# Patient Record
Sex: MALE | Race: WHITE | Employment: OTHER | URBAN - METROPOLITAN AREA
[De-identification: names, ages, dates, MRNs, and addresses within clinical notes are randomized per-mention and may not be internally consistent; named-entity substitution may affect disease eponyms.]

---

## 2017-01-04 ENCOUNTER — HOSPITAL ENCOUNTER (OUTPATIENT)
Dept: MRI IMAGING | Facility: CLINIC | Age: 57
Discharge: HOME | End: 2017-01-04
Attending: PHYSICIAN ASSISTANT | Admitting: PHYSICIAN ASSISTANT
Payer: MEDICARE

## 2017-01-04 DIAGNOSIS — M54.50 LUMBAR BACK PAIN: ICD-10-CM

## 2017-01-04 PROCEDURE — 72148 MRI LUMBAR SPINE W/O DYE: CPT

## 2017-01-05 ENCOUNTER — TELEPHONE (OUTPATIENT)
Dept: ORTHOPEDICS | Facility: CLINIC | Age: 57
End: 2017-01-05

## 2017-01-05 NOTE — TELEPHONE ENCOUNTER
Results for orders placed or performed during the hospital encounter of 01/04/17   MR Lumbar Spine w/o Contrast    Narrative    MR LUMBAR SPINE WITHOUT CONTRAST   1/4/2017 1:57 PM    HISTORY: Bilateral back and right leg pain for 15 years. No specific  injury.    TECHNIQUE: Sagittal T1 and T2 and STIR, axial proton density and T2  images.    COMPARISON: None.    FINDINGS: Five functional lumbar vertebral segments are assumed. The  caudal thecal sac contents appear intrinsically normal. There is a  grade 1 anterolisthesis of L5 on S1 secondary to bilateral L5 pars  interarticularis defects. Alignment in the sagittal view is otherwise  normal. Benign peridiscal degenerative signal changes seen about the  L5-S1 disc space and vertebral bone marrow signal is otherwise  unremarkable.    T12-L1: Normal.    L1-L2: There is central signal loss within the disc space. This may be  related to calcification. The remainder of the disc space is  unremarkable and there is no central or foraminal stenosis.    L2-L3: Signal loss without disc space narrowing. Mild posterior disc  bulging. There is a superimposed right central focal disc bulge or  minimal broad-based disc protrusion with slight indentation on the  anterior thecal sac (image 6 of series 8) and no impingement on neural  structures. No central or foraminal stenosis. Posterior facets are  normal.    L3-L4: Signal loss without disc space narrowing. Mild diffuse disc  bulging with a superimposed small central extruded disc migrating  superiorly. This indents the anterior thecal sac and contributes to  borderline mild central stenosis. Mild left and minimal right  foraminal stenosis. Posterior facets are normal.    L4-L5: Signal loss without disc space narrowing. Posterior disc  bulging and small central peripheral annular fissure. No acute disc  protrusion or central or foraminal stenosis. No significant posterior  facet degenerative changes.    L5-S1: Signal loss,  degenerative disc space narrowing and grade 1  spondylolytic spondylolisthesis with uncovering of the posterior disc  margin. No disc protrusion or central stenosis. Moderate right and  severe left foraminal stenosis related to the degenerative disc  disease and spondylolisthesis.      Impression    IMPRESSION:   1. Early degenerative disc disease L1-L2 and L2-L3 without impingement  on neural structures.  2. Degenerative disc disease L3-L4 with small central extruded disc  and borderline mild central stenosis.  3. Degenerative disc disease L4-L5 without direct impingement on  neural structures.  4. Grade 1 spondylolytic spondylolisthesis and degenerative disc  disease at L5-S1 resulting in moderate right and severe left foraminal  stenosis. Recommend clinical correlation for left-sided L5 nerve root  symptoms.    FELICIA PHELPS MD   He has findings that correlate with his symptoms.  I am recommending physical therapy and an epidural steroid injection to start.  If this fails to improve symptoms he may require further evaluation.      Michelle Waggoner PA-C

## 2017-01-09 NOTE — TELEPHONE ENCOUNTER
"Spoke to patient discussed MRI findings and recommendations.  States he tried ANANTH a year ago and it didn't work.  He has been doing PT over the last year and that increased his pain.  He has an appointment scheduled for a surgical consult later this month.  Also states he has been told his pelvis is \"out of whack\" and wants to know if that is the cause of his right hip pain.  Michelle, would you like him to come in for an eval or do you have further recommendations at this time?    Are there further questions or concerns: Yes  If Yes, they are: as noted  Is a return call needed: Yes  If Yes, preferred contact number: Home    Nakia Richardson, NISHANT   "

## 2017-01-10 NOTE — TELEPHONE ENCOUNTER
Patient already has an appointment with a surgeon, this will be the course of action he will take.    Navdeep GONZALEZ

## 2017-01-10 NOTE — TELEPHONE ENCOUNTER
"The only other option I can provide for him is to have a consultation with a surgeon.  If his pelvis is \"out of alignment\" this can be corrected with physical therapy as they are the appropriate place to assess, evaluate and treat this.  And yes this can help provide relief to his back pain.  I can place the physical therapy order for him or the consultation with a spine surgeon if he prefers.      Michelle Waggoner PA-C   "

## 2017-01-26 ENCOUNTER — TRANSFERRED RECORDS (OUTPATIENT)
Dept: HEALTH INFORMATION MANAGEMENT | Facility: CLINIC | Age: 57
End: 2017-01-26

## 2017-05-02 ENCOUNTER — HISTORIC RESULTS (OUTPATIENT)
Dept: ADMINISTRATIVE | Age: 57
End: 2017-05-02

## 2017-06-08 ENCOUNTER — TRANSFERRED RECORDS (OUTPATIENT)
Dept: HEALTH INFORMATION MANAGEMENT | Facility: HOSPITAL | Age: 57
End: 2017-06-08

## 2017-06-22 ENCOUNTER — TRANSFERRED RECORDS (OUTPATIENT)
Dept: HEALTH INFORMATION MANAGEMENT | Facility: HOSPITAL | Age: 57
End: 2017-06-22

## 2017-06-29 ENCOUNTER — TELEPHONE (OUTPATIENT)
Dept: EDUCATION SERVICES | Facility: HOSPITAL | Age: 57
End: 2017-06-29

## 2017-06-29 DIAGNOSIS — E11.65 TYPE 2 DIABETES MELLITUS WITH HYPERGLYCEMIA, WITHOUT LONG-TERM CURRENT USE OF INSULIN (H): Primary | ICD-10-CM

## 2017-09-21 ENCOUNTER — TRANSFERRED RECORDS (OUTPATIENT)
Dept: HEALTH INFORMATION MANAGEMENT | Facility: HOSPITAL | Age: 57
End: 2017-09-21

## 2017-09-21 LAB — HBA1C MFR BLD: 11 % (ref 0–5.7)

## 2017-09-28 ENCOUNTER — HOSPITAL ENCOUNTER (OUTPATIENT)
Dept: EDUCATION SERVICES | Facility: HOSPITAL | Age: 57
Discharge: HOME | End: 2017-09-28
Attending: NURSE PRACTITIONER | Admitting: NURSE PRACTITIONER
Payer: MEDICARE

## 2017-09-28 VITALS
DIASTOLIC BLOOD PRESSURE: 88 MMHG | BODY MASS INDEX: 41.98 KG/M2 | HEART RATE: 98 BPM | WEIGHT: 299.9 LBS | OXYGEN SATURATION: 96 % | HEIGHT: 71 IN | SYSTOLIC BLOOD PRESSURE: 143 MMHG

## 2017-09-28 DIAGNOSIS — E11.65 TYPE 2 DIABETES MELLITUS WITH HYPERGLYCEMIA, WITHOUT LONG-TERM CURRENT USE OF INSULIN (H): Primary | ICD-10-CM

## 2017-09-28 PROCEDURE — G0108 DIAB MANAGE TRN  PER INDIV: HCPCS

## 2017-09-28 RX ORDER — LANCETS
EACH MISCELLANEOUS
Qty: 102 EACH | Refills: 10 | Status: SHIPPED | OUTPATIENT
Start: 2017-09-28 | End: 2018-06-25

## 2017-09-28 ASSESSMENT — ANXIETY QUESTIONNAIRES
GAD7 TOTAL SCORE: 6
1. FEELING NERVOUS, ANXIOUS, OR ON EDGE: NOT AT ALL
5. BEING SO RESTLESS THAT IT IS HARD TO SIT STILL: MORE THAN HALF THE DAYS
3. WORRYING TOO MUCH ABOUT DIFFERENT THINGS: SEVERAL DAYS
6. BECOMING EASILY ANNOYED OR IRRITABLE: SEVERAL DAYS
IF YOU CHECKED OFF ANY PROBLEMS ON THIS QUESTIONNAIRE, HOW DIFFICULT HAVE THESE PROBLEMS MADE IT FOR YOU TO DO YOUR WORK, TAKE CARE OF THINGS AT HOME, OR GET ALONG WITH OTHER PEOPLE: SOMEWHAT DIFFICULT
2. NOT BEING ABLE TO STOP OR CONTROL WORRYING: NOT AT ALL
7. FEELING AFRAID AS IF SOMETHING AWFUL MIGHT HAPPEN: SEVERAL DAYS

## 2017-09-28 ASSESSMENT — PATIENT HEALTH QUESTIONNAIRE - PHQ9
SUM OF ALL RESPONSES TO PHQ QUESTIONS 1-9: 5
5. POOR APPETITE OR OVEREATING: SEVERAL DAYS

## 2017-09-28 ASSESSMENT — PAIN SCALES - GENERAL: PAINLEVEL: MODERATE PAIN (4)

## 2017-09-28 NOTE — NURSING NOTE
"Chief Complaint   Patient presents with     Diabetes     review       Initial /88 (BP Location: Left arm, Patient Position: Chair, Cuff Size: Adult Large)  Pulse 98  Ht 1.803 m (5' 11\")  Wt 136 kg (299 lb 14.4 oz)  SpO2 96%  BMI 41.83 kg/m2 Estimated body mass index is 41.83 kg/(m^2) as calculated from the following:    Height as of this encounter: 1.803 m (5' 11\").    Weight as of this encounter: 136 kg (299 lb 14.4 oz).  Medication Reconciliation: complete   Silvana Vicente      "

## 2017-09-28 NOTE — PROGRESS NOTES
"Aldo is here for diabetes review. /88 (BP Location: Left arm, Patient Position: Chair, Cuff Size: Adult Large)  Pulse 98  Ht 1.803 m (5' 11\")  Wt 136 kg (299 lb 14.4 oz)  SpO2 96%  BMI 41.83 kg/m2 I had understood that he was here to start insulin, but he tells me he is not starting insulin today.  Aldo states that he was recently started on Glimepiride. He is taking Metformin 1000 mg bid and Glimepiride 2 mg daily. Aldo has not been testing and he needs a meter. His most recent A1C on 9/21/17 was 11 and his previous A1C on 6/8/17 was also 11. Aldo tells me that he needs 4 surgeries but understands that he won't be able to have surgery until A1C comes down.    I asked why he did not want to start insulin. States he wants to give the Glimepiride a chance. Reviewed actions of his diabetes medications. I did discuss with him that insulin would decrease his BG more readily and the injection hurts less than a BG test. With BG testing we will be able to determine if Glimepiride is working in about 2 weeks. I told him that he doesn't want to wait for his next A1C to see that it is still elevated.    Gave Aldo an Accu-Chek Guide meter and showed him how to use it and the puncture device. Will send in prescriptions for meter supplies. I requested that Aldo test once a day varying between fasting and 2 hours after supper. I gave him a log book with testing times and targets noted.    Reviewed usual foods eaten. Aldo gets up at 7 am and drinks coffee but doesn't eat until noon/1 pm: 2 pieces toast with a glass or 2 of milk. Midday he has a smoothie made with spinach and 2 fruits. Evening meal: skinless chicken, fish or lean meat with veggies or crockpot: pork with potatoes/carrots or pasta - is watching portion size. Beverages: coffee 5-6 cups, milk, Powerade Zero.  Snacks: vegetables and 1-2 donuts per week. Aldo declines to keep food records.    Reviewed carb containing foods, portions " sizes and individualized meal plan.    Will follow in one month for BG review and medication adjust    Time spent with patient: 60 minutes

## 2017-09-29 ENCOUNTER — TELEPHONE (OUTPATIENT)
Dept: EDUCATION SERVICES | Facility: HOSPITAL | Age: 57
End: 2017-09-29

## 2017-09-29 DIAGNOSIS — E11.65 TYPE 2 DIABETES MELLITUS WITH HYPERGLYCEMIA, WITHOUT LONG-TERM CURRENT USE OF INSULIN (H): Primary | ICD-10-CM

## 2017-09-29 ASSESSMENT — ANXIETY QUESTIONNAIRES: GAD7 TOTAL SCORE: 6

## 2017-10-26 ENCOUNTER — HOSPITAL ENCOUNTER (OUTPATIENT)
Dept: EDUCATION SERVICES | Facility: HOSPITAL | Age: 57
Discharge: HOME | End: 2017-10-26
Attending: NURSE PRACTITIONER | Admitting: FAMILY MEDICINE
Payer: MEDICARE

## 2017-10-26 VITALS
WEIGHT: 297.5 LBS | HEIGHT: 71 IN | OXYGEN SATURATION: 98 % | SYSTOLIC BLOOD PRESSURE: 114 MMHG | HEART RATE: 109 BPM | BODY MASS INDEX: 41.65 KG/M2 | DIASTOLIC BLOOD PRESSURE: 73 MMHG

## 2017-10-26 DIAGNOSIS — E11.65 TYPE 2 DIABETES MELLITUS WITH HYPERGLYCEMIA, WITHOUT LONG-TERM CURRENT USE OF INSULIN (H): Primary | ICD-10-CM

## 2017-10-26 PROCEDURE — G0108 DIAB MANAGE TRN  PER INDIV: HCPCS

## 2017-10-26 ASSESSMENT — PAIN SCALES - GENERAL: PAINLEVEL: MODERATE PAIN (4)

## 2017-10-26 NOTE — PROGRESS NOTES
"Aldo is here for BG review. /73  Pulse 109  Ht 1.803 m (5' 11\")  Wt 134.9 kg (297 lb 8 oz)  SpO2 98%  BMI 41.49 kg/m2  He is taking Metformin 1000 mg bid and Glimepiride 2 mg daily. Readings range as follows: fastin, 136-165, 182, 203, 224 and post-supper: 130-182, 190, 196, 207, 210, 236 (8 of 14 readings above target). Denies lows.    Aldo is happy as his weight continues to decrease. Weight is down 2 lbs since our last visit.    Reviewed BG readings and Aldo understands that both am and post-supper readings are above target. Reviewed BG/A1C targets and actions of medications. He is open to adding medications as needed so he can decrease his A1C so he can have surgery.    Reviewed potential medication changes. We could increase Glimepiride but it may create midday lows as Aldo doesn't always eat midday and it does promote weight gain. Insulin may promote weight gain. I did discuss adding Victoza and did discuss side effects and potential benefits. It may decrease post-meal readings and have some effect on morning BG readings. Aldo is open to me contacting Dr. Bauer about Victoza.    Plan:  Contact Dr. Bauer about diabetes medication addition.    Time spent with patient 30 minutes.  "

## 2017-10-26 NOTE — NURSING NOTE
"Chief Complaint   Patient presents with     Diabetes     review       Initial /73  Pulse 109  Ht 1.803 m (5' 11\")  Wt 134.9 kg (297 lb 8 oz)  SpO2 98%  BMI 41.49 kg/m2 Estimated body mass index is 41.49 kg/(m^2) as calculated from the following:    Height as of this encounter: 1.803 m (5' 11\").    Weight as of this encounter: 134.9 kg (297 lb 8 oz).  Medication Reconciliation: complete   Silvana Vicente      "

## 2017-11-08 ENCOUNTER — HOSPITAL ENCOUNTER (OUTPATIENT)
Dept: EDUCATION SERVICES | Facility: HOSPITAL | Age: 57
Discharge: HOME | End: 2017-11-08
Attending: NURSE PRACTITIONER | Admitting: FAMILY MEDICINE
Payer: MEDICARE

## 2017-11-08 VITALS
WEIGHT: 300.3 LBS | SYSTOLIC BLOOD PRESSURE: 130 MMHG | HEIGHT: 71 IN | OXYGEN SATURATION: 95 % | DIASTOLIC BLOOD PRESSURE: 83 MMHG | HEART RATE: 110 BPM | BODY MASS INDEX: 42.04 KG/M2

## 2017-11-08 DIAGNOSIS — E11.65 TYPE 2 DIABETES MELLITUS WITH HYPERGLYCEMIA, WITHOUT LONG-TERM CURRENT USE OF INSULIN (H): Primary | ICD-10-CM

## 2017-11-08 PROCEDURE — G0108 DIAB MANAGE TRN  PER INDIV: HCPCS

## 2017-11-08 RX ORDER — DIPHENHYDRAMINE HYDROCHLORIDE 25 MG/1
CAPSULE, LIQUID FILLED ORAL 2 TIMES DAILY
COMMUNITY
Start: 2017-09-22

## 2017-11-08 ASSESSMENT — PAIN SCALES - GENERAL: PAINLEVEL: WORST PAIN (10)

## 2017-11-08 NOTE — NURSING NOTE
"Chief Complaint   Patient presents with     Diabetes Education     Starting Victoza        Initial /83 (BP Location: Left arm, Patient Position: Chair, Cuff Size: Adult Large)  Pulse 110  Ht 1.803 m (5' 11\")  Wt (!) 136.2 kg (300 lb 4.8 oz)  SpO2 95%  BMI 41.88 kg/m2 Estimated body mass index is 41.88 kg/(m^2) as calculated from the following:    Height as of this encounter: 1.803 m (5' 11\").    Weight as of this encounter: 136.2 kg (300 lb 4.8 oz).  Medication Reconciliation: complete   Michelle Daly LPN       "

## 2017-11-10 RX ORDER — LIRAGLUTIDE 6 MG/ML
1.2 INJECTION SUBCUTANEOUS DAILY
Start: 2017-11-10 | End: 2018-06-19

## 2017-11-11 NOTE — PROGRESS NOTES
"Aldo is here to start Victoza. /83 (BP Location: Left arm, Patient Position: Chair, Cuff Size: Adult Large)  Pulse 110  Ht 1.803 m (5' 11\")  Wt (!) 136.2 kg (300 lb 4.8 oz)  SpO2 95%  BMI 41.88 kg/h8Bhsgqanr signed, faxed order from Dr. Bauer to start Victoza and stop Glimepiride. Aldo is taking Metformin 1000 mg bid and Glimepiride 2 mg daily. He did not bring his meter today.     Instructed Aldo about the action of Victoza, side effects, storage, how to use the Victoza pen, Victoza dosage,injection site selection/rotation, disposal of insulin pen needles.    Aldo was able to perform a Victoza injection using a demo pen and injection pillow with minimal cueing.    Plan:  Stop Glimepiride  Start Victoza 0.6 mg daily for one week then increase to 1.2 mg daily.  Call Emma with any questions or concerns.  I will call you next Tuesday to see how you are doing.    Time spent with patient 30 minutes.      "

## 2017-11-13 ENCOUNTER — TELEPHONE (OUTPATIENT)
Dept: EDUCATION SERVICES | Facility: HOSPITAL | Age: 57
End: 2017-11-13

## 2017-11-13 NOTE — TELEPHONE ENCOUNTER
Aldo had a severe reaction to Victoza. He had a headache, lightheadedness, pain in stomach. Went to ED. Stopped Victoza. He is going back to taking Glimepiride 2 mg daily. He wants to follow with Dr. Bauer in January for his A1C before looking at adding any other medication.

## 2018-06-17 ENCOUNTER — ANESTHESIA EVENT (OUTPATIENT)
Dept: SURGERY | Facility: CLINIC | Age: 58
DRG: 470 | End: 2018-06-17
Payer: MEDICARE

## 2018-06-19 RX ORDER — GLIMEPIRIDE 2 MG/1
1 TABLET ORAL
Refills: 0 | COMMUNITY
Start: 2017-10-23 | End: 2020-03-04

## 2018-06-20 ASSESSMENT — LIFESTYLE VARIABLES: TOBACCO_USE: 1

## 2018-06-20 NOTE — ANESTHESIA PREPROCEDURE EVALUATION
Anesthesia Evaluation     .             ROS/MED HX    ENT/Pulmonary:     (+)DELMAR risk factors obese, tobacco use, Current use 1-2 cigars per week  packs/day  , . .    Neurologic:       Cardiovascular:  - neg cardiovascular ROS   (+) ----. : . . . :. . Previous cardiac testing date:results:date: results:ECG reviewed date:5/2/17 results:ST, PVC's and fusion complexes, LAFB, possible lateral infarct-age undetermined date: results:          METS/Exercise Tolerance:  >4 METS   Hematologic:  - neg hematologic  ROS       Musculoskeletal:   (+) , , other musculoskeletal- DJD ; left shoulder pain; displaced disc; bilat. knee pain       GI/Hepatic:  - neg GI/hepatic ROS       Renal/Genitourinary:  - ROS Renal section negative       Endo:     (+) type II DM Last HgA1c: 11.0 Normal glucose range: 167 today Obesity, .      Psychiatric:  - neg psychiatric ROS       Infectious Disease:  - neg infectious disease ROS       Malignancy:      - no malignancy   Other:    (+) H/O Chronic Pain,                   Physical Exam  Normal systems: cardiovascular and pulmonary    Airway   Mallampati: III  TM distance: >3 FB  Neck ROM: full    Dental   (+) missing    Cardiovascular       Pulmonary                     Anesthesia Plan      History & Physical Review  History and physical reviewed and following examination; no interval change.    ASA Status:  3 .    NPO Status:  > 8 hours    Plan for General and Spinal with Propofol induction. Maintenance will be Balanced.    PONV prophylaxis:  Ondansetron (or other 5HT-3) and Dexamethasone or Solumedrol       Postoperative Care  Postoperative pain management:  IV analgesics and Oral pain medications.      Consents  Anesthetic plan, risks, benefits and alternatives discussed with:  Patient..                          .

## 2018-06-21 ENCOUNTER — ANESTHESIA (OUTPATIENT)
Dept: SURGERY | Facility: CLINIC | Age: 58
DRG: 470 | End: 2018-06-21
Payer: MEDICARE

## 2018-06-21 ENCOUNTER — SURGERY (OUTPATIENT)
Age: 58
End: 2018-06-21

## 2018-06-21 ENCOUNTER — HOSPITAL ENCOUNTER (INPATIENT)
Facility: CLINIC | Age: 58
LOS: 3 days | Discharge: NURSING, REHAB OR LONG-TERM CARE | DRG: 470 | End: 2018-06-24
Attending: ORTHOPAEDIC SURGERY | Admitting: ORTHOPAEDIC SURGERY
Payer: MEDICARE

## 2018-06-21 ENCOUNTER — APPOINTMENT (OUTPATIENT)
Dept: GENERAL RADIOLOGY | Facility: CLINIC | Age: 58
DRG: 470 | End: 2018-06-21
Attending: ORTHOPAEDIC SURGERY
Payer: MEDICARE

## 2018-06-21 DIAGNOSIS — Z96.641 STATUS POST TOTAL REPLACEMENT OF RIGHT HIP: Primary | ICD-10-CM

## 2018-06-21 PROBLEM — E11.65 TYPE 2 DIABETES MELLITUS WITH HYPERGLYCEMIA, WITHOUT LONG-TERM CURRENT USE OF INSULIN (H): Status: ACTIVE | Noted: 2017-10-27

## 2018-06-21 PROBLEM — E66.01 MORBID OBESITY, UNSPECIFIED OBESITY TYPE (H): Status: ACTIVE | Noted: 2017-06-22

## 2018-06-21 PROBLEM — M16.9 DEGENERATIVE JOINT DISEASE (DJD) OF HIP: Status: ACTIVE | Noted: 2018-06-21

## 2018-06-21 PROBLEM — Z96.649 S/P TOTAL HIP ARTHROPLASTY: Status: ACTIVE | Noted: 2018-06-21

## 2018-06-21 LAB
GLUCOSE BLDC GLUCOMTR-MCNC: 130 MG/DL (ref 70–99)
GLUCOSE BLDC GLUCOMTR-MCNC: 167 MG/DL (ref 70–99)
GLUCOSE BLDC GLUCOMTR-MCNC: 178 MG/DL (ref 70–99)

## 2018-06-21 PROCEDURE — 25000125 ZZHC RX 250: Performed by: ORTHOPAEDIC SURGERY

## 2018-06-21 PROCEDURE — A9270 NON-COVERED ITEM OR SERVICE: HCPCS | Mod: GY | Performed by: PHYSICIAN ASSISTANT

## 2018-06-21 PROCEDURE — 25000128 H RX IP 250 OP 636: Performed by: PHYSICIAN ASSISTANT

## 2018-06-21 PROCEDURE — 40000986 XR PELVIS AD HIP PORTABLE RIGHT 1 VIEW

## 2018-06-21 PROCEDURE — C1776 JOINT DEVICE (IMPLANTABLE): HCPCS | Performed by: ORTHOPAEDIC SURGERY

## 2018-06-21 PROCEDURE — 37000009 ZZH ANESTHESIA TECHNICAL FEE, EACH ADDTL 15 MIN: Performed by: ORTHOPAEDIC SURGERY

## 2018-06-21 PROCEDURE — 27210794 ZZH OR GENERAL SUPPLY STERILE: Performed by: ORTHOPAEDIC SURGERY

## 2018-06-21 PROCEDURE — 36000093 ZZH SURGERY LEVEL 4 1ST 30 MIN: Performed by: ORTHOPAEDIC SURGERY

## 2018-06-21 PROCEDURE — 25000128 H RX IP 250 OP 636: Performed by: NURSE ANESTHETIST, CERTIFIED REGISTERED

## 2018-06-21 PROCEDURE — 0SR902A REPLACEMENT OF RIGHT HIP JOINT WITH METAL ON POLYETHYLENE SYNTHETIC SUBSTITUTE, UNCEMENTED, OPEN APPROACH: ICD-10-PCS | Performed by: ORTHOPAEDIC SURGERY

## 2018-06-21 PROCEDURE — 40000305 ZZH STATISTIC PRE PROC ASSESS I: Performed by: ORTHOPAEDIC SURGERY

## 2018-06-21 PROCEDURE — 37000008 ZZH ANESTHESIA TECHNICAL FEE, 1ST 30 MIN: Performed by: ORTHOPAEDIC SURGERY

## 2018-06-21 PROCEDURE — C1713 ANCHOR/SCREW BN/BN,TIS/BN: HCPCS | Performed by: ORTHOPAEDIC SURGERY

## 2018-06-21 PROCEDURE — 00000146 ZZHCL STATISTIC GLUCOSE BY METER IP

## 2018-06-21 PROCEDURE — 25000132 ZZH RX MED GY IP 250 OP 250 PS 637: Mod: GY | Performed by: ORTHOPAEDIC SURGERY

## 2018-06-21 PROCEDURE — 25000125 ZZHC RX 250: Performed by: PHYSICIAN ASSISTANT

## 2018-06-21 PROCEDURE — A9270 NON-COVERED ITEM OR SERVICE: HCPCS | Mod: GY | Performed by: ORTHOPAEDIC SURGERY

## 2018-06-21 PROCEDURE — 36000063 ZZH SURGERY LEVEL 4 EA 15 ADDTL MIN: Performed by: ORTHOPAEDIC SURGERY

## 2018-06-21 PROCEDURE — 25000125 ZZHC RX 250: Performed by: NURSE ANESTHETIST, CERTIFIED REGISTERED

## 2018-06-21 PROCEDURE — 71000012 ZZH RECOVERY PHASE 1 LEVEL 1 FIRST HR: Performed by: ORTHOPAEDIC SURGERY

## 2018-06-21 PROCEDURE — 25000128 H RX IP 250 OP 636: Performed by: ORTHOPAEDIC SURGERY

## 2018-06-21 PROCEDURE — 71000013 ZZH RECOVERY PHASE 1 LEVEL 1 EA ADDTL HR: Performed by: ORTHOPAEDIC SURGERY

## 2018-06-21 PROCEDURE — 27210995 ZZH RX 272: Performed by: ORTHOPAEDIC SURGERY

## 2018-06-21 PROCEDURE — 12000007 ZZH R&B INTERMEDIATE

## 2018-06-21 PROCEDURE — 25000132 ZZH RX MED GY IP 250 OP 250 PS 637: Mod: GY | Performed by: PHYSICIAN ASSISTANT

## 2018-06-21 DEVICE — IMP HEAD FEMORAL DEPUY CERAMIC 36MM +1.5MM 1365-36-310: Type: IMPLANTABLE DEVICE | Site: HIP | Status: FUNCTIONAL

## 2018-06-21 DEVICE — IMPLANTABLE DEVICE: Type: IMPLANTABLE DEVICE | Site: HIP | Status: FUNCTIONAL

## 2018-06-21 DEVICE — IMP SCR BONE CAN ACE 6.5X25MM 1217-25-500: Type: IMPLANTABLE DEVICE | Site: HIP | Status: FUNCTIONAL

## 2018-06-21 DEVICE — IMP CUP ACE PINNACLE 58MM 1217-22-058: Type: IMPLANTABLE DEVICE | Site: HIP | Status: FUNCTIONAL

## 2018-06-21 DEVICE — IMP SCR BONE CAN ACE 6.5X20MM 1217-20-500: Type: IMPLANTABLE DEVICE | Site: HIP | Status: FUNCTIONAL

## 2018-06-21 RX ORDER — ONDANSETRON 4 MG/1
4 TABLET, ORALLY DISINTEGRATING ORAL EVERY 6 HOURS PRN
Status: DISCONTINUED | OUTPATIENT
Start: 2018-06-21 | End: 2018-06-24 | Stop reason: HOSPADM

## 2018-06-21 RX ORDER — OXYCODONE HYDROCHLORIDE 5 MG/1
5-10 TABLET ORAL
Status: DISCONTINUED | OUTPATIENT
Start: 2018-06-21 | End: 2018-06-24 | Stop reason: HOSPADM

## 2018-06-21 RX ORDER — KETOROLAC TROMETHAMINE 15 MG/ML
15 INJECTION, SOLUTION INTRAMUSCULAR; INTRAVENOUS EVERY 6 HOURS PRN
Status: COMPLETED | OUTPATIENT
Start: 2018-06-21 | End: 2018-06-23

## 2018-06-21 RX ORDER — CEFAZOLIN SODIUM 1 G/50ML
1 INJECTION, SOLUTION INTRAVENOUS SEE ADMIN INSTRUCTIONS
Status: DISCONTINUED | OUTPATIENT
Start: 2018-06-21 | End: 2018-06-21 | Stop reason: HOSPADM

## 2018-06-21 RX ORDER — NALOXONE HYDROCHLORIDE 0.4 MG/ML
.1-.4 INJECTION, SOLUTION INTRAMUSCULAR; INTRAVENOUS; SUBCUTANEOUS
Status: DISCONTINUED | OUTPATIENT
Start: 2018-06-21 | End: 2018-06-24 | Stop reason: HOSPADM

## 2018-06-21 RX ORDER — GLYCOPYRROLATE 0.2 MG/ML
INJECTION, SOLUTION INTRAMUSCULAR; INTRAVENOUS PRN
Status: DISCONTINUED | OUTPATIENT
Start: 2018-06-21 | End: 2018-06-21

## 2018-06-21 RX ORDER — BUPIVACAINE HYDROCHLORIDE 5 MG/ML
INJECTION, SOLUTION EPIDURAL; INTRACAUDAL PRN
Status: DISCONTINUED | OUTPATIENT
Start: 2018-06-21 | End: 2018-06-21

## 2018-06-21 RX ORDER — AMOXICILLIN 250 MG
2 CAPSULE ORAL 2 TIMES DAILY
Status: DISCONTINUED | OUTPATIENT
Start: 2018-06-21 | End: 2018-06-24 | Stop reason: HOSPADM

## 2018-06-21 RX ORDER — ONDANSETRON 2 MG/ML
INJECTION INTRAMUSCULAR; INTRAVENOUS PRN
Status: DISCONTINUED | OUTPATIENT
Start: 2018-06-21 | End: 2018-06-21

## 2018-06-21 RX ORDER — AMOXICILLIN 250 MG
1 CAPSULE ORAL 2 TIMES DAILY
Status: DISCONTINUED | OUTPATIENT
Start: 2018-06-21 | End: 2018-06-24 | Stop reason: HOSPADM

## 2018-06-21 RX ORDER — HYDROXYZINE HYDROCHLORIDE 25 MG/1
25 TABLET, FILM COATED ORAL EVERY 6 HOURS PRN
Status: DISCONTINUED | OUTPATIENT
Start: 2018-06-21 | End: 2018-06-24 | Stop reason: HOSPADM

## 2018-06-21 RX ORDER — ATORVASTATIN CALCIUM 20 MG/1
40 TABLET, FILM COATED ORAL
Status: DISCONTINUED | OUTPATIENT
Start: 2018-06-21 | End: 2018-06-24 | Stop reason: HOSPADM

## 2018-06-21 RX ORDER — LIDOCAINE 40 MG/G
CREAM TOPICAL
Status: DISCONTINUED | OUTPATIENT
Start: 2018-06-21 | End: 2018-06-24 | Stop reason: HOSPADM

## 2018-06-21 RX ORDER — CYCLOBENZAPRINE HCL 10 MG
10 TABLET ORAL 3 TIMES DAILY PRN
Status: DISCONTINUED | OUTPATIENT
Start: 2018-06-21 | End: 2018-06-24 | Stop reason: HOSPADM

## 2018-06-21 RX ORDER — DEXTROSE MONOHYDRATE 25 G/50ML
25-50 INJECTION, SOLUTION INTRAVENOUS
Status: DISCONTINUED | OUTPATIENT
Start: 2018-06-21 | End: 2018-06-24 | Stop reason: HOSPADM

## 2018-06-21 RX ORDER — CEFAZOLIN SODIUM 1 G/50ML
3 SOLUTION INTRAVENOUS
Status: COMPLETED | OUTPATIENT
Start: 2018-06-21 | End: 2018-06-21

## 2018-06-21 RX ORDER — SODIUM CHLORIDE, SODIUM LACTATE, POTASSIUM CHLORIDE, CALCIUM CHLORIDE 600; 310; 30; 20 MG/100ML; MG/100ML; MG/100ML; MG/100ML
INJECTION, SOLUTION INTRAVENOUS CONTINUOUS
Status: DISCONTINUED | OUTPATIENT
Start: 2018-06-21 | End: 2018-06-22 | Stop reason: CLARIF

## 2018-06-21 RX ORDER — DEXAMETHASONE SODIUM PHOSPHATE 4 MG/ML
INJECTION, SOLUTION INTRA-ARTICULAR; INTRALESIONAL; INTRAMUSCULAR; INTRAVENOUS; SOFT TISSUE PRN
Status: DISCONTINUED | OUTPATIENT
Start: 2018-06-21 | End: 2018-06-21

## 2018-06-21 RX ORDER — ACETAMINOPHEN 325 MG/1
975 TABLET ORAL EVERY 8 HOURS
Status: COMPLETED | OUTPATIENT
Start: 2018-06-21 | End: 2018-06-24

## 2018-06-21 RX ORDER — PROPOFOL 10 MG/ML
INJECTION, EMULSION INTRAVENOUS CONTINUOUS PRN
Status: DISCONTINUED | OUTPATIENT
Start: 2018-06-21 | End: 2018-06-21

## 2018-06-21 RX ORDER — LIDOCAINE 40 MG/G
CREAM TOPICAL
Status: DISCONTINUED | OUTPATIENT
Start: 2018-06-21 | End: 2018-06-21 | Stop reason: HOSPADM

## 2018-06-21 RX ORDER — GLIMEPIRIDE 1 MG/1
1 TABLET ORAL
Status: DISCONTINUED | OUTPATIENT
Start: 2018-06-22 | End: 2018-06-24 | Stop reason: HOSPADM

## 2018-06-21 RX ORDER — HYDROMORPHONE HYDROCHLORIDE 1 MG/ML
.3-.5 INJECTION, SOLUTION INTRAMUSCULAR; INTRAVENOUS; SUBCUTANEOUS
Status: DISCONTINUED | OUTPATIENT
Start: 2018-06-21 | End: 2018-06-24 | Stop reason: HOSPADM

## 2018-06-21 RX ORDER — SODIUM CHLORIDE, SODIUM LACTATE, POTASSIUM CHLORIDE, CALCIUM CHLORIDE 600; 310; 30; 20 MG/100ML; MG/100ML; MG/100ML; MG/100ML
INJECTION, SOLUTION INTRAVENOUS CONTINUOUS
Status: DISCONTINUED | OUTPATIENT
Start: 2018-06-21 | End: 2018-06-21 | Stop reason: HOSPADM

## 2018-06-21 RX ORDER — ONDANSETRON 2 MG/ML
4 INJECTION INTRAMUSCULAR; INTRAVENOUS EVERY 6 HOURS PRN
Status: DISCONTINUED | OUTPATIENT
Start: 2018-06-21 | End: 2018-06-24 | Stop reason: HOSPADM

## 2018-06-21 RX ORDER — CEFAZOLIN SODIUM 2 G/100ML
2 INJECTION, SOLUTION INTRAVENOUS EVERY 8 HOURS
Status: COMPLETED | OUTPATIENT
Start: 2018-06-21 | End: 2018-06-22

## 2018-06-21 RX ORDER — NICOTINE POLACRILEX 4 MG
15-30 LOZENGE BUCCAL
Status: DISCONTINUED | OUTPATIENT
Start: 2018-06-21 | End: 2018-06-24 | Stop reason: HOSPADM

## 2018-06-21 RX ORDER — LIDOCAINE HYDROCHLORIDE 10 MG/ML
INJECTION, SOLUTION INFILTRATION; PERINEURAL PRN
Status: DISCONTINUED | OUTPATIENT
Start: 2018-06-21 | End: 2018-06-21

## 2018-06-21 RX ORDER — FENTANYL CITRATE 50 UG/ML
INJECTION, SOLUTION INTRAMUSCULAR; INTRAVENOUS PRN
Status: DISCONTINUED | OUTPATIENT
Start: 2018-06-21 | End: 2018-06-21

## 2018-06-21 RX ORDER — ACETAMINOPHEN 325 MG/1
650 TABLET ORAL EVERY 4 HOURS PRN
Status: DISCONTINUED | OUTPATIENT
Start: 2018-06-24 | End: 2018-06-24 | Stop reason: HOSPADM

## 2018-06-21 RX ADMIN — ONDANSETRON 4 MG: 2 INJECTION INTRAMUSCULAR; INTRAVENOUS at 16:03

## 2018-06-21 RX ADMIN — KETOROLAC TROMETHAMINE 15 MG: 15 INJECTION, SOLUTION INTRAMUSCULAR; INTRAVENOUS at 23:07

## 2018-06-21 RX ADMIN — PHENYLEPHRINE HYDROCHLORIDE 100 MCG: 10 INJECTION, SOLUTION INTRAMUSCULAR; INTRAVENOUS; SUBCUTANEOUS at 16:13

## 2018-06-21 RX ADMIN — LIDOCAINE HYDROCHLORIDE 1 ML: 10 INJECTION, SOLUTION EPIDURAL; INFILTRATION; INTRACAUDAL; PERINEURAL at 13:26

## 2018-06-21 RX ADMIN — HYDROMORPHONE HYDROCHLORIDE 0.5 MG: 1 INJECTION, SOLUTION INTRAMUSCULAR; INTRAVENOUS; SUBCUTANEOUS at 23:07

## 2018-06-21 RX ADMIN — ATORVASTATIN CALCIUM 40 MG: 20 TABLET, FILM COATED ORAL at 19:54

## 2018-06-21 RX ADMIN — PROPOFOL 50 MCG/KG/MIN: 10 INJECTION, EMULSION INTRAVENOUS at 15:49

## 2018-06-21 RX ADMIN — ACETAMINOPHEN 975 MG: 325 TABLET, FILM COATED ORAL at 19:53

## 2018-06-21 RX ADMIN — FENTANYL CITRATE 100 MCG: 50 INJECTION, SOLUTION INTRAMUSCULAR; INTRAVENOUS at 15:23

## 2018-06-21 RX ADMIN — CEFAZOLIN SODIUM 2 G: 2 INJECTION, SOLUTION INTRAVENOUS at 23:07

## 2018-06-21 RX ADMIN — LIDOCAINE HYDROCHLORIDE 50 MG: 10 INJECTION, SOLUTION INFILTRATION; PERINEURAL at 15:29

## 2018-06-21 RX ADMIN — SODIUM CHLORIDE, POTASSIUM CHLORIDE, SODIUM LACTATE AND CALCIUM CHLORIDE: 600; 310; 30; 20 INJECTION, SOLUTION INTRAVENOUS at 19:55

## 2018-06-21 RX ADMIN — DEXAMETHASONE SODIUM PHOSPHATE 8 MG: 4 INJECTION, SOLUTION INTRA-ARTICULAR; INTRALESIONAL; INTRAMUSCULAR; INTRAVENOUS; SOFT TISSUE at 16:03

## 2018-06-21 RX ADMIN — POVIDONE-IODINE 100 ML GIVEN: 10 SOLUTION TOPICAL at 16:49

## 2018-06-21 RX ADMIN — SODIUM CHLORIDE, POTASSIUM CHLORIDE, SODIUM LACTATE AND CALCIUM CHLORIDE: 600; 310; 30; 20 INJECTION, SOLUTION INTRAVENOUS at 16:12

## 2018-06-21 RX ADMIN — PHENYLEPHRINE HYDROCHLORIDE 100 MCG: 10 INJECTION, SOLUTION INTRAMUSCULAR; INTRAVENOUS; SUBCUTANEOUS at 15:47

## 2018-06-21 RX ADMIN — PHENYLEPHRINE HYDROCHLORIDE 200 MCG: 10 INJECTION, SOLUTION INTRAMUSCULAR; INTRAVENOUS; SUBCUTANEOUS at 16:00

## 2018-06-21 RX ADMIN — SENNOSIDES AND DOCUSATE SODIUM 1 TABLET: 8.6; 5 TABLET ORAL at 19:54

## 2018-06-21 RX ADMIN — PHENYLEPHRINE HYDROCHLORIDE 200 MCG: 10 INJECTION, SOLUTION INTRAMUSCULAR; INTRAVENOUS; SUBCUTANEOUS at 16:04

## 2018-06-21 RX ADMIN — TRANEXAMIC ACID 1 G: 100 INJECTION, SOLUTION INTRAVENOUS at 15:38

## 2018-06-21 RX ADMIN — OXYCODONE HYDROCHLORIDE 5 MG: 5 TABLET ORAL at 21:44

## 2018-06-21 RX ADMIN — TRANEXAMIC ACID 1 G: 100 INJECTION, SOLUTION INTRAVENOUS at 16:54

## 2018-06-21 RX ADMIN — GLYCOPYRROLATE 0.2 MG: 0.2 INJECTION, SOLUTION INTRAMUSCULAR; INTRAVENOUS at 16:06

## 2018-06-21 RX ADMIN — MIDAZOLAM HYDROCHLORIDE 2 MG: 1 INJECTION, SOLUTION INTRAMUSCULAR; INTRAVENOUS at 15:37

## 2018-06-21 RX ADMIN — MIDAZOLAM HYDROCHLORIDE 2 MG: 1 INJECTION, SOLUTION INTRAMUSCULAR; INTRAVENOUS at 15:23

## 2018-06-21 RX ADMIN — SODIUM CHLORIDE, POTASSIUM CHLORIDE, SODIUM LACTATE AND CALCIUM CHLORIDE: 600; 310; 30; 20 INJECTION, SOLUTION INTRAVENOUS at 13:26

## 2018-06-21 RX ADMIN — MIDAZOLAM HYDROCHLORIDE 1 MG: 1 INJECTION, SOLUTION INTRAMUSCULAR; INTRAVENOUS at 15:26

## 2018-06-21 RX ADMIN — PHENYLEPHRINE HYDROCHLORIDE 200 MCG: 10 INJECTION, SOLUTION INTRAMUSCULAR; INTRAVENOUS; SUBCUTANEOUS at 15:56

## 2018-06-21 RX ADMIN — CEFAZOLIN SODIUM 3 G: 1 INJECTION, POWDER, FOR SOLUTION INTRAMUSCULAR; INTRAVENOUS at 15:23

## 2018-06-21 RX ADMIN — PHENYLEPHRINE HYDROCHLORIDE 0.3 MCG/KG/MIN: 10 INJECTION, SOLUTION INTRAMUSCULAR; INTRAVENOUS; SUBCUTANEOUS at 16:17

## 2018-06-21 RX ADMIN — PHENYLEPHRINE HYDROCHLORIDE 100 MCG: 10 INJECTION, SOLUTION INTRAMUSCULAR; INTRAVENOUS; SUBCUTANEOUS at 15:50

## 2018-06-21 RX ADMIN — PHENYLEPHRINE HYDROCHLORIDE 100 MCG: 10 INJECTION, SOLUTION INTRAMUSCULAR; INTRAVENOUS; SUBCUTANEOUS at 15:42

## 2018-06-21 RX ADMIN — GLYCOPYRROLATE 0.2 MG: 0.2 INJECTION, SOLUTION INTRAMUSCULAR; INTRAVENOUS at 16:22

## 2018-06-21 RX ADMIN — OXYCODONE HYDROCHLORIDE 5 MG: 5 TABLET ORAL at 19:54

## 2018-06-21 RX ADMIN — BUPIVACAINE HYDROCHLORIDE 2.5 ML: 5 INJECTION, SOLUTION EPIDURAL; INTRACAUDAL; PERINEURAL at 15:36

## 2018-06-21 ASSESSMENT — ACTIVITIES OF DAILY LIVING (ADL)
RETIRED_COMMUNICATION: 0-->UNDERSTANDS/COMMUNICATES WITHOUT DIFFICULTY
COMMUNICATION: 0 - UNDERSTANDS/COMMUNICATES WITHOUT DIFFICULTY
SWALLOWING: 0 - SWALLOWS FOODS/LIQUIDS WITHOUT DIFFICULTY
SWALLOWING: 0-->SWALLOWS FOODS/LIQUIDS WITHOUT DIFFICULTY
BATHING: 0-->INDEPENDENT
EATING: 0 - INDEPENDENT
DRESS: 0-->INDEPENDENT
TOILETING: 0-->INDEPENDENT
FALL_HISTORY_WITHIN_LAST_SIX_MONTHS: NO
DRESS: 0 - INDEPENDENT
COGNITION: 0 - NO COGNITION ISSUES REPORTED
BATHING: 0 - INDEPENDENT
AMBULATION: 1-->ASSISTIVE EQUIPMENT
RETIRED_EATING: 0-->INDEPENDENT
AMBULATION: 3 - ASSISTIVE EQUIPMENT AND PERSON
TOILETING: 0 - INDEPENDENT
TRANSFERRING: 3 - ASSISTIVE EQUIPMENT AND PERSON
CHANGE_IN_FUNCTIONAL_STATUS_SINCE_ONSET_OF_CURRENT_ILLNESS/INJURY: NO
TRANSFERRING: 0-->INDEPENDENT

## 2018-06-21 NOTE — ANESTHESIA PROCEDURE NOTES
Peripheral nerve/Neuraxial procedure note : intrathecal  Pre-Procedure  Performed by  ANTHONY FOSTER   Location: OR      Pre-Anesthestic Checklist: patient identified, IV checked, risks and benefits discussed, informed consent, monitors and equipment checked and pre-op evaluation    Timeout  Correct Patient: Yes   Correct Procedure: Yes   Correct Site: Yes   Correct Laterality: N/A   Correct Position: Yes   Site Marked: N/A   .   Procedure Documentation  ASA 3  Diagnosis:R MARISOL.    Procedure:    Intrathecal.  Insertion Site:L3-4  (midline approach)      Patient Prep;mask, sterile gloves, povidone-iodine 7.5% surgical scrub, patient draped.  .  Needle: Es tip Spinal Needle (gauge): 22  Spinal/LP Needle Length (inches): 3.5 # of attempts: 1 and  # of redirects:  3 No introducer used .       Assessment/Narrative  Paresthesias: No.  .  .  clear CSF fluid removed . Time Injected: 15:36  Comments:  Pt tolerated procedure well.

## 2018-06-21 NOTE — IP AVS SNAPSHOT
"[facility] SURGICAL: [phone]                                              INTERAGENCY TRANSFER FORM - LAB / IMAGING / EKG / EMG RESULTS   2018                    Hospital Admission Date: 2018  DAVEY CHAVEZ   : 1960  Sex: Male        Attending Provider: Dain Lawrence MD     Allergies:  No Known Allergies    Infection:  None   Service:  SURGERY    Ht:  1.803 m (5' 11\")   Wt:  136.1 kg (300 lb)   Admission Wt:  136.2 kg (300 lb 4.3 oz)    BMI:  41.84 kg/m 2   BSA:  2.61 m 2            Patient PCP Information     Provider PCP Type    Jesi Bauer MD General         Lab Results - 3 Days      Glucose by meter [[id number]] (Abnormal)  Resulted: 18 1124, Result status: Final result    Ordering provider: Dain Lawrence MD  18 1112 Resulting lab: POINT OF CARE TEST, GLUCOSE    Specimen Information    Type Source Collected On     18 1112          Components       Value Reference Range Flag Lab   Glucose 163 70 - 99 mg/dL H 170            Glucose by meter [[id number]] (Abnormal)  Resulted: 18 0637, Result status: Final result    Ordering provider: Dain Lawrence MD  18 0610 Resulting lab: POINT OF CARE TEST, GLUCOSE    Specimen Information    Type Source Collected On     18 0610          Components       Value Reference Range Flag Lab   Glucose 159 70 - 99 mg/dL H 170            Glucose by meter [[id number]] (Abnormal)  Resulted: 18 0240, Result status: Final result    Ordering provider: Dain Lawrence MD  18 0200 Resulting lab: POINT OF CARE TEST, GLUCOSE    Specimen Information    Type Source Collected On     18 0200          Components       Value Reference Range Flag Lab   Glucose 175 70 - 99 mg/dL H 170            Glucose by meter [[id number]] (Abnormal)  Resulted: 18, Result status: Final result    Ordering provider: Dain Lawrence MD  18 Resulting lab: " POINT OF CARE TEST, GLUCOSE    Specimen Information    Type Source Collected On     06/23/18 2124          Components       Value Reference Range Flag Lab   Glucose 159 70 - 99 mg/dL H 170            Glucose by meter [[id number]] (Abnormal)  Resulted: 06/23/18 1638, Result status: Final result    Ordering provider: Dain Lawrence MD  06/23/18 1623 Resulting lab: POINT OF CARE TEST, GLUCOSE    Specimen Information    Type Source Collected On     06/23/18 1623          Components       Value Reference Range Flag Lab   Glucose 134 70 - 99 mg/dL H 170            Glucose by meter [[id number]] (Abnormal)  Resulted: 06/23/18 1125, Result status: Final result    Ordering provider: Dain Lawrence MD  06/23/18 1105 Resulting lab: POINT OF CARE TEST, GLUCOSE    Specimen Information    Type Source Collected On     06/23/18 1105          Components       Value Reference Range Flag Lab   Glucose 182 70 - 99 mg/dL H 170            Glucose by meter [[id number]] (Abnormal)  Resulted: 06/23/18 0653, Result status: Final result    Ordering provider: Dain Lawrence MD  06/23/18 0622 Resulting lab: POINT OF CARE TEST, GLUCOSE    Specimen Information    Type Source Collected On     06/23/18 0622          Components       Value Reference Range Flag Lab   Glucose 162 70 - 99 mg/dL H 170            Hemoglobin [[id number]]  Resulted: 06/23/18 0629, Result status: Final result    Ordering provider: Dain Lawrence MD  06/23/18 0000 Resulting lab: [facility]    Specimen Information    Type Source Collected On   Blood  06/23/18 0609          Components       Value Reference Range Flag Lab   Hemoglobin 14.9 13.3 - 17.7 g/dL  59            Glucose by meter [[id number]] (Abnormal)  Resulted: 06/23/18 0228, Result status: Final result    Ordering provider: Dain Lawrence MD  06/23/18 0215 Resulting lab: POINT OF CARE TEST, GLUCOSE    Specimen Information    Type Source Collected On     06/23/18  0215          Components       Value Reference Range Flag Lab   Glucose 164 70 - 99 mg/dL H 170            Glucose by meter [[id number]] (Abnormal)  Resulted: 06/22/18 2156, Result status: Final result    Ordering provider: Dain Lawrence MD  06/22/18 2138 Resulting lab: POINT OF CARE TEST, GLUCOSE    Specimen Information    Type Source Collected On     06/22/18 2138          Components       Value Reference Range Flag Lab   Glucose 135 70 - 99 mg/dL H 170            Glucose by meter [[id number]] (Abnormal)  Resulted: 06/22/18 1705, Result status: Final result    Ordering provider: Dain Lawrence MD  06/22/18 1653 Resulting lab: POINT OF CARE TEST, GLUCOSE    Specimen Information    Type Source Collected On     06/22/18 1653          Components       Value Reference Range Flag Lab   Glucose 144 70 - 99 mg/dL H 170            Glucose by meter [[id number]] (Abnormal)  Resulted: 06/22/18 1205, Result status: Final result    Ordering provider: Dain Lawrence MD  06/22/18 1153 Resulting lab: POINT OF CARE TEST, GLUCOSE    Specimen Information    Type Source Collected On     06/22/18 1153          Components       Value Reference Range Flag Lab   Glucose 171 70 - 99 mg/dL H 170            Glucose by meter [[id number]] (Abnormal)  Resulted: 06/22/18 1048, Result status: Final result    Ordering provider: Dain Lawrence MD  06/22/18 1036 Resulting lab: POINT OF CARE TEST, GLUCOSE    Specimen Information    Type Source Collected On     06/22/18 1036          Components       Value Reference Range Flag Lab   Glucose 170 70 - 99 mg/dL H 170            Basic metabolic panel [[id number]] (Abnormal)  Resulted: 06/22/18 0738, Result status: Final result    Ordering provider: Dain Lawrence MD  06/22/18 0001 Resulting lab: [facility]    Specimen Information    Type Source Collected On   Blood  06/22/18 0654          Components       Value Reference Range Flag Lab   Sodium 136  133 - 144 mmol/L  59   Potassium 4.4 3.4 - 5.3 mmol/L  59   Chloride 103 94 - 109 mmol/L  59   Carbon Dioxide 24 20 - 32 mmol/L  59   Anion Gap 9 3 - 14 mmol/L  59   Glucose 170 70 - 99 mg/dL H 59   Urea Nitrogen 17 7 - 30 mg/dL  59   Creatinine 0.78 0.66 - 1.25 mg/dL  59   GFR Estimate >90 >60 mL/min/1.7m2  59   Comment:  Non  GFR Calc   GFR Estimate If Black >90 >60 mL/min/1.7m2  59   Comment:  African American GFR Calc   Calcium 8.9 8.5 - 10.1 mg/dL  59            Hemoglobin [[id number]]  Resulted: 06/22/18 0734, Result status: Final result    Ordering provider: Dain Lawrence MD  06/22/18 0001 Resulting lab: [facility]    Specimen Information    Type Source Collected On   Blood  06/22/18 0654          Components       Value Reference Range Flag Lab   Hemoglobin 15.5 13.3 - 17.7 g/dL  59            Glucose by meter [[id number]] (Abnormal)  Resulted: 06/22/18 0654, Result status: Final result    Ordering provider: Dain Lawrence MD  06/22/18 0636 Resulting lab: POINT OF CARE TEST, GLUCOSE    Specimen Information    Type Source Collected On     06/22/18 0636          Components       Value Reference Range Flag Lab   Glucose 169 70 - 99 mg/dL H 170            Glucose by meter [[id number]] (Abnormal)  Resulted: 06/22/18 0205, Result status: Final result    Ordering provider: Dain Lawrence MD  06/22/18 0139 Resulting lab: POINT OF CARE TEST, GLUCOSE    Specimen Information    Type Source Collected On     06/22/18 0139          Components       Value Reference Range Flag Lab   Glucose 191 70 - 99 mg/dL H 170            Glucose by meter [[id number]] (Abnormal)  Resulted: 06/21/18 2152, Result status: Final result    Ordering provider: Dain Lawrence MD  06/21/18 2140 Resulting lab: POINT OF CARE TEST, GLUCOSE    Specimen Information    Type Source Collected On     06/21/18 2140          Components       Value Reference Range Flag Lab   Glucose 178 70 - 99  mg/dL H 170            Glucose by meter [[id number]] (Abnormal)  Resulted: 06/21/18 1758, Result status: Final result    Ordering provider: Dain Lawrence MD  06/21/18 1744 Resulting lab: POINT OF CARE TEST, GLUCOSE    Specimen Information    Type Source Collected On     06/21/18 1744          Components       Value Reference Range Flag Lab   Glucose 130 70 - 99 mg/dL H 170            Glucose by meter [[id number]] (Abnormal)  Resulted: 06/21/18 1338, Result status: Final result    Ordering provider: Dain Lawrence MD  06/21/18 1317 Resulting lab: POINT OF CARE TEST, GLUCOSE    Specimen Information    Type Source Collected On     06/21/18 1317          Components       Value Reference Range Flag Lab   Glucose 167 70 - 99 mg/dL H 170            Testing Performed By     Lab - Abbreviation Name Director Address Valid Date Range    59 - Unknown [facility] Unknown [address] [facility] [address] 12/31/14 1006 - Present    170 - Unknown POINT OF CARE TEST, GLUCOSE Unknown Unknown 10/31/11 1114 - Present            Unresulted Labs (24h ago through future)    Start       Ordered    Unscheduled  Hemoglobin  CONDITIONAL X 2,   Routine     Comments:  Release on POD 1 and POD 2 if the morning Hgb is less than 8.0    06/21/18 1909         Imaging Results - 3 Days      XR Pelvis w Hip Port Right 1 View [[id number]]  Resulted: 06/21/18 1909, Result status: Final result    Ordering provider: Dain Lawrence MD  06/21/18 1742 Resulted by: Latrell Cormier MD    Performed: 06/21/18 1743 - 06/21/18 1821 Resulting lab: RADIOLOGY RESULTS    Narrative:       XR PELVIS AD HIP PORTABLE RIGHT 1 VIEW   6/21/2018 6:21 PM     HISTORY: Post Op Hip Arthroplasty;     COMPARISON: None.      Impression:       IMPRESSION: A right hip arthroplasty has been performed. The  components appear to be in proper position. No postoperative  complications are identified.    WILBERT CORMIER MD      Testing Performed  By     Lab - Abbreviation Name Director Address Valid Date Range    104 - Rad Rslts RADIOLOGY RESULTS Unknown Unknown 02/16/05 1553 - Present            Encounter-Level Documents:     There are no encounter-level documents.      Order-Level Documents:     There are no order-level documents.

## 2018-06-21 NOTE — IP AVS SNAPSHOT
"    [facility] SURGICAL: [phone]                                              INTERAGENCY TRANSFER FORM - PHYSICIAN ORDERS   2018                    Hospital Admission Date: 2018  DAVEY CHAVEZ   : 1960  Sex: Male        Attending Provider: Dain Lawrence MD     Allergies:  No Known Allergies    Infection:  None   Service:  SURGERY    Ht:  1.803 m (5' 11\")   Wt:  136.1 kg (300 lb)   Admission Wt:  136.2 kg (300 lb 4.3 oz)    BMI:  41.84 kg/m 2   BSA:  2.61 m 2            Patient PCP Information     Provider PCP Type    Jesi Bauer MD General      ED Clinical Impression     Diagnosis Description Comment Added By Time Added    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]  Dain Lawrence MD 2018  9:01 AM      Hospital Problems as of 2018              Priority Class Noted POA    Morbid obesity, unspecified obesity type (H) Medium  2017 Yes    Type 2 diabetes mellitus with hyperglycemia, without long-term current use of insulin (H) Medium  10/27/2017 Yes    Dyslipidemia Medium  2018 Yes    S/P total hip arthroplasty, right Medium  2018 No    Sciatic leg pain Medium  2018 Yes    HTN (hypertension) Medium  2018 No      Non-Hospital Problems as of 2018              Priority Class Noted    Degenerative joint disease (DJD) of hip Medium  2018      Code Status History     Date Active Date Inactive Code Status Order ID Comments User Context    2018  9:05 AM  Full Code [id number]  Dain Lawrence MD Outpatient    2018  7:09 PM 2018  9:05 AM Full Code [id number]  Dain Lawrence MD Inpatient         Medication Review      START taking        Dose / Directions Comments    acetaminophen 325 MG tablet   Commonly known as:  TYLENOL        Dose:  650 mg   Take 2 tablets (650 mg) by mouth every 4 hours as needed for other (multimodal " surgical pain management along with NSAIDS and opioid medication as indicated based on pain control and physical function.)   Quantity:  100 tablet   Refills:  0        cyclobenzaprine 10 MG tablet   Commonly known as:  FLEXERIL        Dose:  10 mg   Take 1 tablet (10 mg) by mouth 3 times daily as needed for muscle spasms   Quantity:  42 tablet   Refills:  0        hydrOXYzine 25 MG tablet   Commonly known as:  ATARAX        Dose:  25 mg   Take 1 tablet (25 mg) by mouth every 6 hours as needed for itching (Helps with muscle spasms, nausea)   Quantity:  60 tablet   Refills:  0        oxyCODONE IR 5 MG tablet   Commonly known as:  ROXICODONE        Dose:  5-10 mg   Take 1-2 tablets (5-10 mg) by mouth every 3 hours as needed for other (pain control or improvement in physical function. Hold dose for analgesic side effects.)   Quantity:  60 tablet   Refills:  0        senna-docusate 8.6-50 MG per tablet   Commonly known as:  SENOKOT-S;PERICOLACE        Dose:  1 tablet   Take 1 tablet by mouth daily as needed for constipation   Quantity:  15 tablet   Refills:  0          CONTINUE these medications which may have CHANGED, or have new prescriptions. If we are uncertain of the size of tablets/capsules you have at home, strength may be listed as something that might have changed.        Dose / Directions Comments    aspirin 325 MG EC tablet   This may have changed:    - medication strength  - how much to take        Dose:  325 mg   Take 1 tablet (325 mg) by mouth daily   Quantity:  40 tablet   Refills:  0        blood glucose monitoring lancets   This may have changed:    - when to take this  - additional instructions   Used for:  Type 2 diabetes mellitus with hyperglycemia, without long-term current use of insulin (H)        Use to test blood sugar 1 times daily.   Quantity:  102 each   Refills:  10    Please fax [phone] if lancets are not covered. Thanks!       blood glucose monitoring test strip   Commonly known as:   ACCU-CHEK GUIDE   This may have changed:    - when to take this  - additional instructions   Used for:  Type 2 diabetes mellitus with hyperglycemia, without long-term current use of insulin (H)        Use to test blood sugar 1 times daily   Quantity:  50 each   Refills:  10    Please fax [phone] if these test strips are not covered.         CONTINUE these medications which have NOT CHANGED        Dose / Directions Comments    ALEVE PO        Dose:  440 mg   Take 440 mg by mouth 2 times daily as needed for moderate pain   Refills:  0        Blood Glucose Monitor System w/Device Kit        2 times daily   Refills:  0        glimepiride 2 MG tablet   Commonly known as:  AMARYL        Dose:  1 mg   Take 1 mg by mouth every morning (before breakfast)   Refills:  0        LIPITOR PO        Dose:  40 mg   Take 40 mg by mouth daily   Refills:  0        METFORMIN HCL PO        Dose:  1000 mg   Take 1,000 mg by mouth 2 times daily (with meals)   Refills:  0                  Further instructions from your care team       Monitor blood glucose level 2 times per day.    Summary of Visit     Reason for your hospital stay       Right hip replacement 6.21.18             After Care     Activity - Up with assistive device           Advance Diet as Tolerated       Follow this diet upon discharge: Regular       General info for SNF       Length of Stay Estimate: Short Term Care: Estimated # of Days <30  Condition at Discharge: Improving  Level of care:skilled   Rehabilitation Potential: Excellent  Admission H&P remains valid and up-to-date: Yes  Recent Chemotherapy: N/A  Use Nursing Home Standing Orders: Yes       Mantoux instructions       Give two-step Mantoux (PPD) Per Facility Policy Yes       Wound care (specify)       Leave aquacel dressing in place.  It will be removed at 2 week post op appointment  Dressing is waterproof.  OK to shower and gently pat dry.             Referrals     Occupational Therapy Adult Consult        Evaluate and treat as clinically indicated.    Reason:  S/p right total hip arthroplasty       Physical Therapy Adult Consult       Evaluate and treat as clinically indicated.    Reason:  S/p right total hip arthroplasty             Follow-Up Appointment Instructions     Future Labs/Procedures    Follow Up and recommended labs and tests     Comments:    Follow up in 2 weeks  Call  to schedule or confirm appointment      Follow-Up Appointment Instructions     Follow Up and recommended labs and tests       Follow up in 2 weeks  Call  to schedule or confirm appointment             Statement of Approval     Ordered          06/24/18 1057  I have reviewed and agree with all the recommendations and orders detailed in this document.  EFFECTIVE NOW     Approved and electronically signed by:  Antony Laird MD

## 2018-06-21 NOTE — IP AVS SNAPSHOT
` `     [facility] SURGICAL: [phone]            Medication Administration Report for Aldo Castillo as of 18 1136   Legend:    Given Hold Not Given Due Canceled Entry Other Actions    Time Time (Time) Time  Time-Action       Inactive    Active    Linked        Medications 18    acetaminophen (TYLENOL) tablet 650 mg  Dose: 650 mg  Freq: EVERY 4 HOURS PRN Route: PO  PRN Reason: other  PRN Comment: multimodal surgical pain management along with NSAIDS and opioid medication as indicated based on pain control and physical function.  Start: 18 0000   Admin Instructions: May give first dose 4 hours after last scheduled dose of acetaminophen  Maximum acetaminophen dose from all sources = 75 mg/kg/day not to exceed 4 grams/day.    Admin. Amount: 2 tablet (2 × 325 mg tablet)  Dispense Loc: [facility] 200  POC: Post-procedure               aspirin EC tablet 325 mg  Dose: 325 mg  Freq: DAILY Route: PO  Indications Comment: VTE Prophylaxis  Start: 18 0800   End: 18 0759   Admin Instructions: DO NOT CRUSH.    Admin. Amount: 1 tablet (1 × 325 mg tablet)  Last Admin: 18 075  Dispense Loc: [facility] 200  Administrations Remainin  POC: Post-procedure         0818 (325 mg)-Given        0803 (325 mg)-Given        0752 (325 mg)-Given           atorvastatin (LIPITOR) tablet 40 mg  Dose: 40 mg  Freq: DAILY AT 8PM Route: PO  Start: 18   Admin. Amount: 2 tablet (2 × 20 mg tablet)  Last Admin: 18  Dispense Loc: [facility] 200         (40 mg)-Given         (40 mg)-Given         (40 mg)-Given        [ ]            benzocaine-menthol (CEPACOL) 15-3.6 MG lozenge 1-2 lozenge  Dose: 1-2 lozenge  Freq: EVERY 1 HOUR PRN Route: BU  PRN Reason: sore throat  PRN Comment: sore throat without fever  Start: 18 190   Admin. Amount: 1-2 lozenge  Dispense Loc: [facility] 200  POC:  Post-procedure               cyclobenzaprine (FLEXERIL) tablet 10 mg  Dose: 10 mg  Freq: 3 TIMES DAILY PRN Route: PO  PRN Reason: muscle spasms  Start: 06/21/18 1909   Admin Instructions: Caution to be used when administering multiple CNS depressing meds within a short time frame.    Admin. Amount: 1 tablet (1 × 10 mg tablet)  Last Admin: 06/24/18 0755  Dispense Loc: [facility] 200  POC: Post-procedure         1504 (10 mg)-Given         0755 (10 mg)-Given           glimepiride (AMARYL) tablet 1 mg  Dose: 1 mg  Freq: EVERY MORNING BEFORE BREAKFAST Route: PO  Start: 06/22/18 0730   Admin Instructions: Take before or with meals.    Admin. Amount: 1 tablet (1 × 1 mg tablet)  Last Admin: 06/24/18 0752  Dispense Loc: [facility]         0823 (1 mg)-Given        0804 (1 mg)-Given        0752 (1 mg)-Given           glucose gel 15-30 g  Dose: 15-30 g  Freq: EVERY 15 MIN PRN Route: PO  PRN Reason: low blood sugar  Start: 06/21/18 1909   Admin Instructions: Give 15 g for BG 51 to 69 mg/dL IF patient is conscious and able to swallow. Give 30 g for BG less than or equal to 50 mg/dL IF patient is conscious and able to swallow. Do NOT give glucose gel via enteral tube.  IF patient has enteral tube: give apple juice 120 mL (4 oz or 15 g of CHO) via enteral tube for BG 51 to 69 mg/dL.  Give apple juice 240 mL (8 oz or 30 g of CHO) via enteral tube for BG less than or equal to 50 mg/dL.    ~Oral gel is preferable for conscious and able to swallow patient.   ~IF gel unavailable or patient refuses may provide apple juice 120 mL (4 oz or 15 g of CHO). Document juice on I and O flowsheet.    Admin. Amount: 15-30 g  Dispense Loc: [facility] 200  Volume: 93.75 mL  POC: Post-procedure              Or  dextrose 50 % injection 25-50 mL  Dose: 25-50 mL  Freq: EVERY 15 MIN PRN Route: IV  PRN Reason: low blood sugar  Start: 06/21/18 1909   Admin Instructions: Use if have IV access, BG less than 70 mg/dL and meet dose criteria  below:  Dose if conscious and alert (or disorientated) and NPO = 25 mL  Dose if unconscious / not alert = 50 mL  Vesicant. For ordered doses up to 25 g, give IV Push undiluted. Give each 5g over 1 minute.    Admin. Amount: 25-50 mL  Dispense Loc: [facility] 200  Infused Over: 1-5 Minutes  Volume: 50 mL  POC: Post-procedure              Or  glucagon injection 1 mg  Dose: 1 mg  Freq: EVERY 15 MIN PRN Route: SC  PRN Reason: low blood sugar  PRN Comment: May repeat x 1 only  Start: 06/21/18 1909   Admin Instructions: May give SQ or IM. ONLY use glucagon IF patient has NO IV access AND is UNABLE to swallow AND blood glucose is LESS than or EQUAL to 50 mg/dL.  If ordered IV, give IV Push over 1 minute. Reconstitute with 1mL sterile water.    Admin. Amount: 1 mg  Dispense Loc: [facility] 200  POC: Post-procedure               hydrALAZINE (APRESOLINE) injection 10 mg  Dose: 10 mg  Freq: EVERY 6 HOURS PRN Route: IV  PRN Reason: high blood pressure  PRN Comment: SBP > 165  Start: 06/22/18 1657   Admin Instructions: For ordered doses up to 40 mg, give IV Push undiluted over 1 minute.    Admin. Amount: 10 mg = 0.5 mL Conc: 20 mg/mL  Dispense Loc: [facility] 200  Infused Over: 1 Minutes  Volume: 0.5 mL               HYDROmorphone (PF) (DILAUDID) injection 0.3-0.5 mg  Dose: 0.3-0.5 mg  Freq: EVERY 2 HOURS PRN Route: IV  PRN Reasons: severe pain,other  PRN Comment: pain control or improvement in physical function. Hold dose for analgesic side effects.  Start: 06/21/18 1909   Admin Instructions: Start at the lowest dose.  May adjust dose by 0.1 mg every 2 hours as needed.  Hold dose for analgesic side effects.  Notify provider to assess for uncontrolled pain or analgesic side effects.   Hold while on IV PCA or with regular IV opioid dosing.  For ordered doses up to 4 mg give IV Push undiluted. Administer each 2mg over 2-5 minutes.    Admin. Amount: 0.3-0.5 mg  Last Admin: 06/21/18 2307  Dispense Loc: [facility] 200  POC:  Post-procedure        2307 (0.5 mg)-Given              hydrOXYzine (ATARAX) tablet 25 mg  Dose: 25 mg  Freq: EVERY 6 HOURS PRN Route: PO  PRN Reason: itching  Start: 06/21/18 1909   Admin Instructions: Caution to be used when administering multiple Central Nervous System (CNS) depressing meds within a short time frame.    Admin. Amount: 1 tablet (1 × 25 mg tablet)  Last Admin: 06/23/18 0803  Dispense Loc: [facility] 200  POC: Post-procedure         0320 (25 mg)-Given       1332 (25 mg)-Given       1917 (25 mg)-Given        0121 (25 mg)-Given       0803 (25 mg)-Given            insulin aspart (NovoLOG) inj (RAPID ACTING)  Dose: 1-7 Units  Freq: AT BEDTIME Route: SC  Start: 06/22/18 2200   Admin Instructions: HIGH INSULIN RESISTANCE DOSING    Do Not give Bedtime Correction Insulin if BG less than 200.   For  - 224 give 1 units.   For  - 249 give 2 units.   For  - 274 give 3 units.   For  - 299 give 4 units.   For  - 324 give 5 units.   For  - 349 give 6 units.   For BG greater than or equal to 350 give 7 units.   Notify provider if glucose greater than or equal to 350 mg/dL after administration of correction dose.  If given at mealtime, must be administered 5 min before meal or immediately after.    Admin. Amount: 1-7 Units  Dispense Loc: Contact Rx for dose  Volume: 3 mL         (2208)-Not Given        (2128)-Not Given        [ ] 2200           insulin aspart (NovoLOG) inj (RAPID ACTING)  Dose: 1-10 Units  Freq: 3 TIMES DAILY BEFORE MEALS Route: SC  Start: 06/22/18 1700   Admin Instructions: Correction Scale - HIGH INSULIN RESISTANCE DOSING     Do Not give Correction Insulin if Pre-Meal BG less than 140.   For Pre-Meal  - 164 give 1 unit.   For Pre-Meal  - 189 give 2 units.   For Pre-Meal  - 214 give 3 units.   For Pre-Meal  - 239 give 4 units.   For Pre-Meal  - 264 give 5 units.   For Pre-Meal  - 289 give 6 units.   For Pre-Meal  - 314  "give 7 units.   For Pre-Meal  - 339 give 8 units.   For Pre-Meal  - 364 give 9 units.   For Pre-Meal BG greater than or equal to 365 give 10 units  To be given with prandial insulin, and based on pre-meal blood glucose.   Notify provider if glucose greater than or equal to 350 mg/dL after administration of correction dose.  If given at mealtime, must be administered 5 min before meal or immediately after.    Admin. Amount: 1-10 Units  Last Admin: 06/24/18 0815  Dispense Loc: Contact Rx for dose  Volume: 3 mL         1725 (1 Units)-Given        0821 (1 Units)-Given       1247 (2 Units)-Given       (1638)-Not Given        0815 (1 Units)-Given       [ ] 1130       [ ] 1630           lidocaine (LMX4) kit  Freq: EVERY 1 HOUR PRN Route: Top  PRN Reason: pain  PRN Comment: with VAD insertion or accessing implanted port.  Start: 06/21/18 1909   Admin Instructions: Do NOT give if patient has a history of allergy to any local anesthetic or any \"cristina\" product.   Apply 30 minutes prior to VAD insertion or port access.  MAX Dose:  2.5 g (  of 5 g tube)    Dispense Loc: [facility] Floor Stock  POC: Post-procedure               lidocaine 1 % 1 mL  Dose: 1 mL  Freq: EVERY 1 HOUR PRN Route: OTHER  PRN Comment: mild pain with VAD insertion or accessing implanted port  Start: 06/21/18 1909   Admin Instructions: Do NOT give if patient has a history of allergy to any local anesthetic or any \"cristina\" product. MAX dose 1 mL subcutaneous OR intradermal in divided doses.    Admin. Amount: 1 mL  Dispense Loc: [facility] 200  Volume: 5 mL  POC: Post-procedure               metFORMIN (GLUCOPHAGE) tablet 1,000 mg  Dose: 1,000 mg  Freq: 2 TIMES DAILY WITH MEALS Route: PO  Start: 06/22/18 0800   Admin. Amount: 2 tablet (2 × 500 mg tablet)  Last Admin: 06/24/18 0752  Dispense Loc: [facility] 200         0818 (1,000 mg)-Given       1724 (1,000 mg)-Given        0803 (1,000 mg)-Given       1705 (1,000 mg)-Given        0752 (1,000 mg)-Given     "   [ ] 1730           naloxone (NARCAN) injection 0.1-0.4 mg  Dose: 0.1-0.4 mg  Freq: EVERY 2 MIN PRN Route: IV  PRN Reason: opioid reversal  Start: 06/21/18 1909   Admin Instructions: For respiratory rate LESS than or EQUAL to 8.  Partial reversal dose:  0.1 mg titrated q 2 minutes for Analgesia Side Effects Monitoring Sedation Level of 3 (frequently drowsy, arousable, drifts to sleep during conversation).Full reversal dose:  0.4 mg bolus for Analgesia Side Effects Monitoring Sedation Level of 4 (somnolent, minimal or no response to stimulation).  For ordered doses up to 2mg give IVP. Give each 0.4mg over 15 seconds in emergency situations. For non-emergent situations further dilute in 9mL of NS to facilitate titration of response.    Admin. Amount: 0.1-0.4 mg = 0.25-1 mL Conc: 0.4 mg/mL  Dispense Loc: [facility] 200  Volume: 1 mL  POC: Post-procedure               ondansetron (ZOFRAN-ODT) ODT tab 4 mg  Dose: 4 mg  Freq: EVERY 6 HOURS PRN Route: PO  PRN Reasons: nausea,vomiting  Start: 06/21/18 1909   Admin Instructions: This is Step 1 of nausea and vomiting management.  If nausea not resolved in 15 minutes, go to Step 2 prochlorperazine (COMPAZINE). Do not push through foil backing. Peel back foil and gently remove. Place on tongue immediately. Administration with liquid unnecessary  With dry hands, peel back foil backing and gently remove tablet; do not push oral disintegrating tablet through foil backing; administer immediately on tongue and oral disintegrating tablet dissolves in seconds; then swallow with saliva; liquid not required.    Admin. Amount: 1 tablet (1 × 4 mg tablet)  Dispense Loc: [facility] 200  POC: Post-procedure              Or  ondansetron (ZOFRAN) injection 4 mg  Dose: 4 mg  Freq: EVERY 6 HOURS PRN Route: IV  PRN Reasons: nausea,vomiting  Start: 06/21/18 1909   Admin Instructions: This is Step 1 of nausea and vomiting management.  If nausea not resolved in 15 minutes, go to Step 2  prochlorperazine (COMPAZINE).  Irritant. For ordered doses up to 4 mg, give IV Push undiluted over 2-5 minutes.    Admin. Amount: 4 mg = 2 mL Conc: 4 mg/2 mL  Dispense Loc: [facility] 200  Infused Over: 2-5 Minutes  Volume: 2 mL  POC: Post-procedure               oxyCODONE IR (ROXICODONE) tablet 5-10 mg  Dose: 5-10 mg  Freq: EVERY 3 HOURS PRN Route: PO  PRN Reason: other  PRN Comment: pain control or improvement in physical function. Hold dose for analgesic side effects.  Start: 06/21/18 1909   Admin Instructions: Start with the lowest dose.    May adjust dose by 5 mg every 3 hours as needed.  Notify provider to assess for uncontrolled pain or analgesic side effects. Hold while on PCA or with regular IV opioid dosing. Maximum total is 80 mg in 24 hours.    Admin. Amount: 1-2 tablet (1-2 × 5 mg tablet)  Last Admin: 06/24/18 0928  Dispense Loc: [facility] 200  POC: Post-procedure        1954 (5 mg)-Given       2144 (5 mg)-Given        0136 (10 mg)-Given       0645 (10 mg)-Given       1332 (10 mg)-Given       1641 (10 mg)-Given       1948 (10 mg)-Given       2305 (10 mg)-Given        0224 (10 mg)-Given       0802 (10 mg)-Given       1114 (10 mg)-Given       1416 (10 mg)-Given       1708 (10 mg)-Given       1954 (10 mg)-Given       2322 (10 mg)-Given        0217 (10 mg)-Given       0608 (10 mg)-Given       0928 (10 mg)-Given           senna-docusate (SENOKOT-S;PERICOLACE) 8.6-50 MG per tablet 1 tablet  Dose: 1 tablet  Freq: 2 TIMES DAILY Route: PO  Start: 06/21/18 1909   Admin Instructions: If no bowel movement in 24 hours, increase to 2 tablets PO.  Hold for loose stools.    Admin. Amount: 1 tablet  Last Admin: 06/24/18 0753  Dispense Loc: [facility] 200  POC: Post-procedure        1954 (1 tablet)-Given                              1950 (1 tablet)-Given        0753 (1 tablet)-Given       [ ] 2000          Or  senna-docusate (SENOKOT-S;PERICOLACE) 8.6-50 MG per tablet 2 tablet  Dose: 2 tablet  Freq: 2 TIMES DAILY  Route: PO  Start: 18   Admin Instructions: Hold for loose stools.    Admin. Amount: 2 tablet  Last Admin: 18 08  Dispense Loc: [facility] 200  POC: Post-procedure                818 (2 tablet)-Given        (2 tablet)-Given        803 (2 tablet)-Given                      [ ]            sodium chloride (PF) 0.9% PF flush 3 mL  Dose: 3 mL  Freq: EVERY 8 HOURS Route: IK  Start: 18   Admin Instructions: And Q1H PRN, to lock peripheral IV dormant line.    Admin. Amount: 3 mL  Last Admin: 18  Dispense Loc: [facility] Floor Stock  Volume: 3 mL  POC: Post-procedure        ()-Not Given        (333)-Not Given              192 (3 mL)-Given        022 (3 mL)-Given        (3 mL)-Given        (3 mL)-Given        218 (3 mL)-Given       ()-Not Given       [ ]            sodium chloride (PF) 0.9% PF flush 3 mL  Dose: 3 mL  Freq: EVERY 1 HOUR PRN Route: IK  PRN Reason: line flush  PRN Comment: for peripheral IV flush post IV meds  Start: 18   Admin. Amount: 3 mL  Dispense Loc: [facility] Floor Stock  Volume: 3 mL  POC: Post-procedure              Completed Medications  Medications 18         Dose: 975 mg  Freq: EVERY 8 HOURS Route: PO  Start: 18   End: 18   Admin Instructions: Do not use if patient has an active opioid/acetaminophen combined analgesic product ordered for pain.  Maximum acetaminophen dose from all sources = 75 mg/kg/day not to exceed 4 grams/day.    Admin. Amount: 3 tablet (3 × 325 mg tablet)  Last Admin: 18  Dispense Loc: [facility] 200  Administrations Remainin  POC: Post-procedure         (975 mg)-Given        0320 (975 mg)-Given        (975 mg)-Given        (975 mg)-Given        0224 (975 mg)-Given       1114 (975 mg)-Given       1950 (975 mg)-Given        0217 (975 mg)-Given       1113 (975 mg)-Given             Dose: 2 g  Freq:  EVERY 8 HOURS Route: IV  Indications of Use: PERIOPERATIVE PHARMACOPROPHYLAXIS  Start: 18 2330   End: 18 0725   Admin Instructions: First post-op dose due 8 hours after intra-op dose, see eMAR.      Admin. Amount: 2 g = 100 mL Conc: 2 g/100 mL  Last Admin: 18  Dispense Loc: [facility]  Infused Over: 30 Minutes  Administrations Remainin  Volume: 100 mL  POC: Post-procedure        2307 (2 g)-Given        0655 (2 g)-Given               Dose: 3 g  Freq: PRE-OP/PRE-PROCEDURE Route: IV  Indications of Use: PERIOPERATIVE PHARMACOPROPHYLAXIS  Start: 18   End: 18   Admin Instructions: Give first dose within 1 hour PRIOR to incision.    Admin. Amount: 3 g = 100 mL Conc: 3 g/100 mL  Last Admin: 18  Dispense Loc: [facility]  Infused Over: 30 Minutes  Administrations Remainin  Volume: 100 mL  POC: Pre-procedure        1523 (3 g)-Given                Dose: 15 mg  Freq: EVERY 6 HOURS PRN Route: IV  PRN Reason: moderate to severe pain  Start: 18   End: 18   Admin Instructions: Can cause pain on injection. Administer through a running maintenance fluid over 1 minute followed by a flush. If patient complains of pain on injection, may dilute 15-30 mg in 5 mL and push over 1 to 2 minutes.    Admin. Amount: 15 mg = 1 mL Conc: 15 mg/mL  Last Admin: 18  Dispense Loc: [facility] 200  Administrations Remainin  Volume: 1 mL        2307 (15 mg)-Given        0645 (15 mg)-Given       1331 (15 mg)-Given       1919 (15 mg)-Given        0119 (15 mg)-Given              Dose: 1 g  Freq: EVERY 2 HOURS Route: IV  Start: 18   End: 18   Admin Instructions: Administer 1st dose 5-10 minutes prior to incision.  Administer 2nd dose upon wound closure or at surgeon's discretion.  Each 1 gram to be infused over 10 minutes.    Admin. Amount: 1 g  Last Admin: 18  Dispense Loc: [facility]  Administrations  Remainin  Volume: 60 mL  POC: Pre-procedure   Mixture Administration Information:   Medication Type Amount   tranexamic acid 1000 MG/10ML SOLN Medications 1 g   sodium chloride 0.9 % SOLN Base 50 mL                1538 (1 g)-New Bag       1654 (1 g)-Bolus                           Discontinued Medications  Medications 18         Dose: 1 g  Freq: SEE ADMIN INSTRUCTIONS Route: IV  Indications of Use: PERIOPERATIVE PHARMACOPROPHYLAXIS  Start: 18 1228   End: 18   Admin Instructions: Give every 2 hours while patient in surgery, starting 2 hours after pre-op dose. DO NOT GIVE intra-op dose if CrCl less than 10 mL/min (on dialysis).  If CrCL less than 50 mL/min, double the time interval between doses.    Admin. Amount: 1 g = 50 mL Conc: 1 g/50 mL  Dispense Loc: [facility]  Infused Over: 30 Minutes  Volume: 50 mL  POC: Pre-procedure        1736-Med Discontinued            Dose: 1-5 Units  Freq: AT BEDTIME Route: SC  Start: 18   End: 18   Admin Instructions: MEDIUM INSULIN RESISTANCE DOSING    Do Not give Bedtime Correction Insulin if BG less than  200.   For  - 249 give 1 units.   For  - 299 give 2 units.   For  - 349 give 3 units.   For  -399 give 4 units.   For BG greater than or equal to 400 give 5 units.  Notify provider if glucose greater than or equal to 350 mg/dL after administration of correction dose.  If given at mealtime, must be administered 5 min before meal or immediately after.    Admin. Amount: 1-5 Units  Dispense Loc: Contact Rx for dose  Volume: 3 mL  POC: Post-procedure        ()-Not Given        -Med Discontinued           Dose: 1-7 Units  Freq: 3 TIMES DAILY BEFORE MEALS Route: SC  Start: 18   End: 18   Admin Instructions: Correction Scale - MEDIUM INSULIN RESISTANCE DOSING     Do Not give Correction Insulin if Pre-Meal BG less than 140.  "  For Pre-Meal  - 189 give 1 unit.   For Pre-Meal  - 239 give 2 units.   For Pre-Meal  - 289 give 3 units.   For Pre-Meal  - 339 give 4 units.   For Pre-Meal - 399 give 5 units.   For Pre-Meal -449 give 6 units  For Pre-Meal BG greater than or equal to 450 give 7 units.   To be given with prandial insulin, and based on pre-meal blood glucose.    Notify provider if glucose greater than or equal to 350 mg/dL after administration of correction dose.  If given at mealtime, must be administered 5 min before meal or immediately after.    Admin. Amount: 1-7 Units  Last Admin: 06/22/18 1205  Dispense Loc: Contact Rx for dose  Volume: 3 mL  POC: Post-procedure         0817 (1 Units)-Given       1205 (1 Units)-Given       1655-Med Discontinued                 Rate: 75 mL/hr   Freq: CONTINUOUS Route: IV  Last Dose: Stopped (06/22/18 1100)  Start: 06/21/18 1915   End: 06/22/18 1628   Admin Instructions: Change to saline lock when PO well tolerated.    Last Admin: 06/22/18 0636  Dispense Loc: [facility] Floor Stock  Volume: 1,000 mL  POC: Post-procedure        1955 ( )-New Bag        0636 ( )-New Bag       1100-Stopped [C]       1628-Med Discontinued           Rate: 10 mL/hr   Freq: CONTINUOUS Route: IV  Start: 06/21/18 1230   End: 06/21/18 1736   Last Admin: 06/21/18 1612  Dispense Loc: [facility] Floor Stock  Volume: 1,000 mL  POC: Pre-procedure        1326 ( )-New Bag       1612 ( )-New Bag       1728 ( )-Anesthesia Volume Adjustment       1736-Med Discontinued            Freq: EVERY 1 HOUR PRN Route: Top  PRN Reason: pain  PRN Comment: with VAD insertion or accessing implanted port.  Start: 06/21/18 1228   End: 06/21/18 1736   Admin Instructions: Do NOT give if patient has a history of allergy to any local anesthetic or any \"cristina\" product.   Apply 30 minutes prior to VAD insertion or port access.  MAX Dose:  2.5 g (  of 5 g tube)    Dispense Loc: [facility] Floor Stock  POC: Pre-procedure        1736-Med " "Discontinued            Dose: 1 mL  Freq: EVERY 1 HOUR PRN Route: OTHER  PRN Comment: mild pain with VAD insertion or accessing implanted port  Start: 06/21/18 1228   End: 06/21/18 1736   Admin Instructions: Do NOT give if patient has a history of allergy to any local anesthetic or any \"cristina\" product. MAX dose 1 mL subcutaneous OR intradermal in divided doses.    Admin. Amount: 1 mL  Last Admin: 06/21/18 1326  Dispense Loc: [facility]  Volume: 2 mL  POC: Pre-procedure        1326 (1 mL)-Given       1736-Med Discontinued            Dose: 100 ml given  Freq: PRN  Start: 06/21/18 1649   End: 06/21/18 1849   Last Admin: 06/21/18 1649  Volume: 517 mL  POC: Intra-procedure   Mixture Administration Information:   Medication Type Amount   povidone-iodine 10 % SOLN Additives 17 mL   sodium chloride 0.9% (bottle) 0.9 % SOLN Base 500 mL   dose administered = Medications 100 ml given                1649 (100 ml given)-Given       1849-Med Discontinued            Dose: 3 mL  Freq: EVERY 8 HOURS Route: IK  Start: 06/21/18 1228   End: 06/21/18 1736   Admin Instructions: And Q1H PRN, to lock peripheral IV dormant line.    Admin. Amount: 3 mL  Dispense Loc: [facility] Floor Stock  Volume: 3 mL  POC: Pre-procedure               1736-Med Discontinued            Dose: 3 mL  Freq: EVERY 1 HOUR PRN Route: IK  PRN Reason: line flush  PRN Comment: for peripheral IV flush post IV meds  Start: 06/21/18 1228   End: 06/21/18 1736   Admin. Amount: 3 mL  Dispense Loc: [facility] Floor Stock  Volume: 3 mL  POC: Pre-procedure        1736-Med Discontinued       Medications 06/18/18 06/19/18 06/20/18 06/21/18 06/22/18 06/23/18 06/24/18        "

## 2018-06-21 NOTE — OR NURSING
No noted rashes or bruising. Has several old scars on left leg, bilateral feet are foul smelling and toenails thick and very long. Pt states he has not been able to trim his nails for a long time, the last time his physician had to do a trim for him.

## 2018-06-21 NOTE — IP AVS SNAPSHOT
"` `           [facility] SURGICAL: [phone]                                              INTERAGENCY TRANSFER FORM - NURSING   2018                    Hospital Admission Date: 2018  DAVEY CHAVEZ   : 1960  Sex: Male        Attending Provider: Dain Lawernce MD     Allergies:  No Known Allergies    Infection:  None   Service:  SURGERY    Ht:  1.803 m (5' 11\")   Wt:  136.1 kg (300 lb)   Admission Wt:  136.2 kg (300 lb 4.3 oz)    BMI:  41.84 kg/m 2   BSA:  2.61 m 2            Patient PCP Information     Provider PCP Type    Jesi Bauer MD General      Current Code Status     Date Active Code Status Order ID Comments User Context       Prior      Code Status History     Date Active Date Inactive Code Status Order ID Comments User Context    2018  9:05 AM  Full Code [id number]  Dain Lawrence MD Outpatient    2018  7:09 PM 2018  9:05 AM Full Code [id number]  Dain Lawrence MD Inpatient      Advance Directives        Scanned docmt in ACP Activity?           No scanned doc        Hospital Problems as of 2018              Priority Class Noted POA    Morbid obesity, unspecified obesity type (H) Medium  2017 Yes    Type 2 diabetes mellitus with hyperglycemia, without long-term current use of insulin (H) Medium  10/27/2017 Yes    Dyslipidemia Medium  2018 Yes    S/P total hip arthroplasty, right Medium  2018 No    Sciatic leg pain Medium  2018 Yes    HTN (hypertension) Medium  2018 No      Non-Hospital Problems as of 2018              Priority Class Noted    Degenerative joint disease (DJD) of hip Medium  2018      Immunizations     Name Date      Influenza (IIV3) PF 11/23/10     Pneumococcal 23 valent 11     TDAP Vaccine (Adacel) 12     TDAP Vaccine (Adacel) 08          END      ASSESSMENT     Discharge Profile Flowsheet     EXPECTED DISCHARGE     Passing flatus  no 18 0813    " Expected Discharge Date  06/24/18 06/22/18 1327   COMMUNICATION ASSESSMENT      DISCHARGE NEEDS ASSESSMENT     Patient's communication style  spoken language (English or Bilingual) 06/19/18 1210    Concerns To Be Addressed  all concerns addressed in this encounter 06/21/18 1913   FINAL RESOURCES      Anticipated Changes Related to Illness  none 06/21/18 1913   Resources List  Skilled Nursing Facility 06/22/18 1327    Transportation Available  family or friend will provide 06/22/18 1518   Skilled Nursing Facility  [place] [phone], Fax: [phone] 06/22/18 1505    Primary Care MD Name  Jesi Bauer 06/22/18 1327   PAS Number  [id number] 06/22/18 1517    FUNCTIONAL LEVEL CURRENT     SKIN      Ambulation  3 - assistive equipment and person 06/22/18 1004   Inspection of bony prominences  Full except (identify areas not inspected) 06/24/18 0327    Transferring  3 - assistive equipment and person 06/22/18 1004   Procedural focused assessment (identify areas inspected)   -- (Right hip, leg, and foot) 06/21/18 1732    Toileting  0 - independent 06/22/18 1004   Inspection under devices  Full 06/23/18 0115    Bathing  0 - independent 06/22/18 1004   Skin WDL  ex 06/23/18 1530    Dressing  0 - independent 06/22/18 1004   Skin Color/Characteristics  other (see comments) (unable to view incision) 06/23/18 0115    Eating  0 - independent 06/22/18 1004   Skin Temperature  warm 06/23/18 1530    Communication  0 - understands/communicates without difficulty 06/22/18 1004   Skin Moisture  dry 06/23/18 0115    Swallowing  0 - swallows foods/liquids without difficulty 06/22/18 1004   Skin Elasticity  quick return to original state 06/23/18 0115    Change in Functional Status Since Onset of Current Illness/Injury  no 06/21/18 1912   Skin Integrity  incision(s) 06/24/18 0327    GASTROINTESTINAL (ADULT,PEDIATRIC,OB)     Full except areas not inspected   Hip, left;Hip, right;Buttock, left;Buttock, right;Sacrum;Coccyx  "06/24/18 0327    GI WDL  ex 06/24/18 0813   SAFETY      Abdominal Appearance  obese 06/24/18 0813   Safety WDL  WDL 06/24/18 0813    All Quadrants Bowel Sounds  audible and active in all quadrants 06/24/18 0813   All Alarms  none present (pt using call light for needs) 06/24/18 0813    Last Bowel Movement  06/23/18 06/24/18 0813                      Assessment WDL (Within Defined Limits) Definitions           Safety WDL     Effective: 09/28/15    Row Information: <b>WDL Definition:</b> Bed in low position, wheels locked; call light in reach; upper side rails up x 2; ID band on<br> <font color=\"gray\"><i>Item=AS safety wdl>>List=AS safety wdl>>Version=F14</i></font>      Skin WDL     Effective: 09/28/15    Row Information: <b>WDL Definition:</b> Warm; dry; intact; elastic; without discoloration; pressure points without redness<br> <font color=\"gray\"><i>Item=AS skin wdl>>List=AS skin wdl>>Version=F14</i></font>      Vitals     Vital Signs Flowsheet     VITAL SIGNS     Comfort  intolerable 06/24/18 1114    Temp  97.5  F (36.4  C) 06/24/18 0802   Change in Pain  getting worse (with ambulation) 06/24/18 1114    Temp src  Oral 06/24/18 0802   Pain Control  partially effective 06/24/18 1044    Resp  18 06/24/18 0802   Functioning  can do most things, but pain gets in the way of some 06/24/18 1044    Pulse  103 06/23/18 1517   Sleep  awake with occasional pain 06/24/18 0317    Heart Rate  80 06/24/18 0802   ANALGESIA SIDE EFFECTS MONITORING      Pulse/Heart Rate Source  Monitor 06/24/18 0802   Side Effects Monitoring: Respiratory Quality  R 06/24/18 1114    BP  132/88 06/24/18 0802   Side Effects Monitoring: Respiratory Depth  N 06/24/18 1114    BP Location  Right arm 06/23/18 2334   Side Effects Monitoring: Sedation Level  1 06/24/18 1114    OXYGEN THERAPY     HEIGHT AND WEIGHT      SpO2  95 % 06/24/18 0802   Height  1.803 m (5' 11\") 06/21/18 1931    O2 Device  None (Room air) 06/24/18 0802   Weight  136.1 kg (300 lb) " 06/21/18 1238    Oxygen Delivery  1 LPM 06/22/18 0147   BSA (Calculated - sq m)  2.61 06/21/18 1238    RESPIRATORY MONITORING     BMI (Calculated)  41.93 06/21/18 1238    Respiratory Monitoring (EtCO2)  35 mmHg 06/22/18 0147   POSITIONING      Integrated Pulmonary Index (IPI)  8-9 06/22/18 0147   Body Position  independently positioning (adjusts self in chair) 06/24/18 0813    PAIN/COMFORT     Head of Bed (HOB)  HOB at 15 degrees 06/24/18 0205    Patient Currently in Pain  yes 06/24/18 0758   Chair  Upright in chair 06/23/18 0501    Preferred Pain Scale  CAPA (Clinically Aligned Pain Assessment) ([facility] Adults Only) 06/24/18 0317   Positioning/Transfer Devices  pillows 06/23/18 0115    Pain Location  Back 06/24/18 0758   ECG      Pain Orientation  Lower 06/24/18 0758   ECG Rhythm  Sinus rhythm 06/21/18 1749    Pain Descriptors  Sharp (with movement) 06/24/18 0758   DAILY CARE      Pain Management Interventions  analgesia administered;cold applied 06/24/18 0758   Activity Management  up in chair 06/24/18 1115    Pain Intervention(s)  Medication (See eMAR) 06/24/18 1114   Activity Assistance Provided  assistance, 1 person 06/24/18 1104    CLINICALLY ALIGNED PAIN ASSESSMENT (CAPA) ([facility] ADULTS ONLY)     Assistive Device Utilized  gait belt;walker 06/24/18 1104            Patient Lines/Drains/Airways Status    Active LINES/DRAINS/AIRWAYS     Name: Placement date: Placement time: Site: Days: Last dressing change:    Incision/Surgical Site 06/21/18 Right Hip 06/21/18   1650    2             Patient Lines/Drains/Airways Status    Active PICC/CVC     None            Intake/Output Detail Report     Date Intake     Output   Net    Shift P.O. I.V. IV Piggyback Total Urine Blood Total       Noc 06/22/18 2300 - 06/23/18 0659 -- -- -- -- -- -- -- 0    Day 06/23/18 0700 - 06/23/18 1459 810 -- -- 810 -- -- -- 810    Ekaterina 06/23/18 1500 - 06/23/18 2259 480 -- -- 480 -- -- -- 480    Vin 06/23/18  2300 - 06/24/18 0659 -- -- -- -- 400 -- 400 -400    Day 06/24/18 0700 - 06/24/18 1459 240 -- -- 240 0 -- -- 240      Last Void/BM       Most Recent Value    Urine Occurrence 1 at 06/24/2018 0206    Stool Occurrence 1 at 06/23/2018 1743      Case Management/Discharge Planning     Case Management/Discharge Planning Flowsheet     REFERRAL INFORMATION     ASSESSMENT/CONCERNS TO BE ADDRESSED      Did the Initial Social Work Assessment result in a Social Work Case?  No 06/22/18 1327   Concerns To Be Addressed  all concerns addressed in this encounter 06/21/18 1913    Reason For Consult  discharge planning 06/22/18 1327   DISCHARGE PLANNING      Primary Care MD Name  Jesi Lizette 06/22/18 1327   Anticipated Changes Related to Illness  none 06/21/18 1913    LIVING ENVIRONMENT     Transportation Available  family or friend will provide 06/22/18 1515    Lives With  alone 06/22/18 1515   FINAL RESOURCES      Living Arrangements  house 06/22/18 1515   Resources List  Skilled Nursing Facility 06/22/18 1327    Able to Return to Prior Living Arrangements  no 06/22/18 1327   Skilled Nursing Facility  [facility] [phone], Fax: [phone] 06/22/18 1502    ASSESSMENT OF FAMILY/SOCIAL SUPPORT     [facility] Number  [id number] 06/22/18 1517    Who is your support system?  Children 06/22/18 1327   ABUSE RISK SCREEN      Description of Support System  Supportive 06/22/18 1327   QUESTION TO PATIENT:  Has a member of your family or a partner(now or in the past) intimidated, hurt, manipulated, or controlled you in any way?  no 06/21/18 1912    Support Assessment  Adequate family and caregiver support 06/22/18 1327   QUESTION TO PATIENT: Do you feel safe going back to the place where you are living?  yes 06/21/18 1912    COPING/STRESS     OBSERVATION: Is there reason to believe there has been maltreatment of a vulnerable adult (ie. Physical/Sexual/Emotional abuse, self neglect, lack of adequate food, shelter, medical care, or  financial exploitation)?  no 06/21/18 1912    Major Change/Loss/Stressor  none 06/19/18 1210   OTHER      EXPECTED DISCHARGE     Are you depressed or being treated for depression?  No 06/21/18 1912    Expected Discharge Date  06/24/18 06/22/18 0977

## 2018-06-21 NOTE — PROGRESS NOTES
06/21/18    PREOP DIAGNOSIS: Right hip primary degenerative joint disease  POSTOP DIAGNOSIS: Right hip primary degenerative joint disease  PROCEDURE: Right total hip arthroplasty  SURGEON: Dain Lawrence   ASSISTANT: Denae Bernstein.  The presence of a PA was necessary for positioning, retraction, and safe progression of the case  ANESTHESIA: Spinal with sedation   EBL: 200  COMPLICATIONS: None apparent   DISPO: Stable to PACU     IMPLANTS: DePuy Corailsize 12 standard offset collared femur, 36mm x +1.5mm ceramic femoral head, 36mm x 58mm neutral polyethylene liner, and 58 mm Bessemer Cup with 6.5mm acetabular screws x 2    INDICATIONS: Brett is a 58 year old male with a history of progressive right hip pain due to end stage arthritis.  After failing nonoperative management, he elected to proceed with a right MARISOL, understanding the risks of infection, damage to vessels or nerves, blood clots, instability, leg length discrepancy, ongoing pain and need for revision surgery.     PROCEDURE: Patient was met in the preoperative holding area where consent was reviewed and the right hip was marked.  He was then transferred to the operating theater. After a spinal anesthetic was placed, he was placed in a left lateral decubitus position with his right side up. All bony prominences were padded, he was secured with a Stulberg hip positioner, and an axillary roll was placed.  A timeout was performed verifying the correct patient, surgery, and location. He received preoperative antibiotics as well as transexamic acid. The right lower extremity was then prepped and draped in a standard fashion.     We then made an incision in line with a posterior approach to the hip. Dissection was sharply carried down through skin and subcutaneous tissue, and the gluteus fascia incised in line with the incision. After palpating and protecting the sciatic nerve, an east west retractor was placed. We then released the piriformis and short  external rotators and then performed a T Capsulotomy. The hip was dislocated and a neck cut made, approximately 4 mm proximal to the lesser trochanter. The femoral head measured 53mm and showed end stage arthritic change.    We turned our attention to the acetabulum. After placing anterior and posterior retractors, foveal and labral tissue were removed.  We started with a 52mm reamer and sequentially reamed to a 57, in approximately 45 deg of abduction and 30 deg of anteversion. At that point, we had good bleeding bone circumferentially.  A trial cup was placed with good pressfit followed by our final 58mm cup, again with good press fit.  Given his size, fixation was supplemented with two 6.5mm screws both with good purchase.  We then placed a neutral liner. We then turned our attention to the femur. After using a cookie cutter and canal finder, we broached to a size 12, keeping the stem in approximatley 10-15 deg of anteversion. At that point, we had excellent rotational stability.  We then placed a standard offset neck with and a variety of different length femoral heads.  We felt the +1.5mm head most appropriate which showed leg lengths felt appropriate, the hip was stable in full extension and maximal external rotation, in the sleeping position, and with flexion to 90 degrees and internal rotation to 70 degrees.     We then removed all trial components and thoroughly irrigated the wound. We placed our final size 12stem.  We then retrialed with a +1.5mm head.  Findings as above with flexion to 90 and IR to 70.  We impacted our final 1.5 mm head. The wound was instilled with a dilute betadine solution. Capsule and short external rotators were repaired with 0-ethibond, gluteus fascia with #1 stratafix, subdermal sutures with 2-0 vicryl, and a running 3-0 subcuticular monocryl. A sterile dressing followed by an abductor pillow was placed and the patient was transferred to the PACU in stable condition.        POSTOPERATIVE PLAN:   1. WBAT right LE with posterior hip precautions   2. PT for mobilization   3. DVT prophylaxis: ASA 325mg daily x 6 weeks  4. Perioperative antibiotics     Dain Lawrence MD

## 2018-06-21 NOTE — IP AVS SNAPSHOT
` `     [facility] SURGICAL: [phone]                 INTERAGENCY TRANSFER FORM - NOTES (H&P, Discharge Summary, Consults, Procedures, Therapies)   2018                    Hospital Admission Date: 2018  DAVEY CHAVEZ   : 1960  Sex: Male        Patient PCP Information     Provider PCP Type    Jesi Bauer MD General         History & Physicals      Interval H&P Note by Meli Mills APRN CRNA at 2018  1:54 PM     Author:  Meli Mills APRN CRNA Service:  Anesthesiology Author Type:  Nurse Anesthetist    Filed:  2018  1:54 PM Date of Service:  2018  1:54 PM Creation Time:  2018  1:54 PM    Status:  Signed :  Meli Mills APRN CRNA (Nurse Anesthetist)         The History and Physical has been reviewed, the patient has been examined and no changes have occurred in the patient's condition since the H & P was completed.[TW1.1]          Revision History        User Key Date/Time User Provider Type Action    > TW1.1 2018  1:54 PM Meli Mills APRN CRNA Nurse Anesthetist Sign          Source Note     Author:  Scan, Provider Service:  (none) Author Type:  Physician    Filed:  2018 11:47 AM Date of Service:  2018 11:46 AM Creation Time:  2018 11:47 AM    Status:  Signed :  Chula, Provider (Physician)         Scan on 2018 11:47 AM by Chula, Provider : ALEX H+P 1          Revision History        User Key Date/Time User Provider Type Action    > [N/A] 2018 11:47 AM Scan, Provider Physician Sign                  H&P signed by Scan Provider at 2018 11:47 AM      Author:  Scan, Provider Service:  (none) Author Type:  Physician    Filed:  2018 11:47 AM Date of Service:  2018 11:46 AM Creation Time:  2018 11:47 AM    Status:  Signed :  [place], Provider (Physician)     Scan on 2018 11:47 AM by Chula, Provider : ALEX H+P 1          Revision History        User Key Date/Time  User Provider Type Action    > [N/A] 6/13/2018 11:47 AM Scan, Provider Physician Sign                     Discharge Summaries      Discharge Summaries by Dain Lawrence MD at 6/24/2018  9:06 AM     Author:  Dain Lawrence MD Service:  Orthopedics Author Type:  Physician    Filed:  6/24/2018  9:06 AM Date of Service:  6/24/2018  9:06 AM Creation Time:  6/24/2018  9:05 AM    Status:  Signed :  Dain Lawrence MD (Physician)           [facility] Orthopedics Discharge Summary                                  [facility]     DAVEY CHAVEZ [id number]   Age: 58 year old  PCP: Jesi Bauer, [phone] 1960     Date of Admission:  6/21/2018  Date of Discharge::[EP1.1]  6/24/2018[EP1.2]  Discharge Physician:  Dain Lawrence    Code status:  Prior    Admission Information:  Admission Diagnosis:  right hip arthritis  S/P total hip arthroplasty    Post-Operative Day: 3 Days Post-Op     Reason for admission:  The patient was admitted for the following:Procedure(s) (LRB):  ARTHROPLASTY HIP (Right)    Active Problems:    Type 2 diabetes mellitus with hyperglycemia, without long-term current use of insulin (H)    Morbid obesity, unspecified obesity type (H)    Dyslipidemia    S/P total hip arthroplasty, right    Sciatic leg pain    HTN (hypertension)      Allergies:  Review of patient's allergies indicates no known allergies.    Following the procedure noted above the patient was transferred to the post-op floor and started on:    Therapy:  physical therapy and occupational therapy  Anticoagulation Plan:  mg daily  for 42 days  Pain Management: oxycodone, tylenol and vistaril  Weight bearing status: Weight bearing as tolerated     The patient was followed and co-managed by the hospitalist service during the inpatient treatment course  Complications:  None  Consultations:  None     Pertinent Labs   Lab Results: personally reviewed.     Recent Labs   Lab Test   06/23/18   0609  06/22/18   0654   HGB  14.9  15.5   NA   --   136          Discharge Information:  Condition at discharge: Stable  Discharge destination:  Discharged to short-term care facility     Medications at discharge:  Current Discharge Medication List      START taking these medications    Details   acetaminophen (TYLENOL) 325 MG tablet Take 2 tablets (650 mg) by mouth every 4 hours as needed for other (multimodal surgical pain management along with NSAIDS and opioid medication as indicated based on pain control and physical function.)  Qty: 100 tablet, Refills: 0    Associated Diagnoses: Status post total replacement of right hip      cyclobenzaprine (FLEXERIL) 10 MG tablet Take 1 tablet (10 mg) by mouth 3 times daily as needed for muscle spasms  Qty: 42 tablet, Refills: 0    Associated Diagnoses: Status post total replacement of right hip      hydrOXYzine (ATARAX) 25 MG tablet Take 1 tablet (25 mg) by mouth every 6 hours as needed for itching (Helps with muscle spasms, nausea)  Qty: 60 tablet, Refills: 0    Associated Diagnoses: Status post total replacement of right hip      oxyCODONE IR (ROXICODONE) 5 MG tablet Take 1-2 tablets (5-10 mg) by mouth every 3 hours as needed for other (pain control or improvement in physical function. Hold dose for analgesic side effects.)  Qty: 60 tablet, Refills: 0    Associated Diagnoses: Status post total replacement of right hip      senna-docusate (SENOKOT-S;PERICOLACE) 8.6-50 MG per tablet Take 1 tablet by mouth daily as needed for constipation  Qty: 15 tablet, Refills: 0    Associated Diagnoses: Status post total replacement of right hip         CONTINUE these medications which have CHANGED    Details   aspirin 325 MG EC tablet Take 1 tablet (325 mg) by mouth daily  Qty: 40 tablet, Refills: 0    Associated Diagnoses: Status post total replacement of right hip         CONTINUE these medications which have NOT CHANGED    Details   Atorvastatin Calcium (LIPITOR PO) Take  40 mg by mouth daily      blood glucose monitoring (ACCU-CHEK FASTCLIX) lancets Use to test blood sugar 1 times daily.  Qty: 102 each, Refills: 10    Comments: Please fax [phone] if lancets are not covered. Thanks!  Associated Diagnoses: Type 2 diabetes mellitus with hyperglycemia, without long-term current use of insulin (H)      blood glucose monitoring (ACCU-CHEK GUIDE) test strip Use to test blood sugar 1 times daily  Qty: 50 each, Refills: 10    Comments: Please fax [phone] if these test strips are not covered.  Associated Diagnoses: Type 2 diabetes mellitus with hyperglycemia, without long-term current use of insulin (H)      Blood Glucose Monitoring Suppl (BLOOD GLUCOSE MONITOR SYSTEM) W/DEVICE KIT 2 times daily       glimepiride (AMARYL) 2 MG tablet Take 1 mg by mouth every morning (before breakfast)   Refills: 0      METFORMIN HCL PO Take 1,000 mg by mouth 2 times daily (with meals)      Naproxen Sodium (ALEVE PO) Take 440 mg by mouth 2 times daily as needed for moderate pain                        Follow-Up Care:  Patient should be seen in the office in 10-14 days by the Orthopedic Surgeon/Physician Assistant.  Call [phone] for appointment or questions.    Dain Lawrence[EP1.1]       Revision History        User Key Date/Time User Provider Type Action    > EP1.2 6/24/2018  9:06 AM Dain Lawrence MD Physician Sign     EP1.1 6/24/2018  9:05 AM Dain Lawrence MD Physician                      Consult Notes      Consults by Marlin Garcia RN at 6/22/2018  1:32 PM     Author:  Marlin Garcia RN Service:  (none) Author Type:      Filed:  6/22/2018  3:01 PM Date of Service:  6/22/2018  1:32 PM Creation Time:  6/22/2018  1:27 PM    Status:  Signed :  Marlin Garcia RN ()     Consult Orders:    1. Care Transition RN/SW IP Consult [[id number]] ordered by Dain Lawrence MD at 06/22/18 1324                Care Transition Initial Assessment -  RN  Reason For Consult: discharge planning   Met with: Patient.    DATA   Active Problems:    Type 2 diabetes mellitus with hyperglycemia, without long-term current use of insulin (H)    Dyslipidemia    S/P total hip arthroplasty          Primary Care MD Name: Jesi Bauer  Contact information and PCP information verified: Yes    ASSESSMENT  Cognitive Status: awake, alert and oriented.       Resources List: Skilled Nursing Facility     Lives With: alone  Living Arrangements: house     Description of Support System: Supportive   Who is your support system?: Children   Support Assessment: Adequate family and caregiver support   Insurance Concerns: No Insurance issues identified      This writer met with pt, introduced self and role.  Discussed discharge planning and Medicare guidelines in regards to home care, TCU and LTC.  The patient had a MARISOL and is unable to care for self post op.  Discussed TCU, patient was provided with Medicare certified nursing home list. Pts choices are as follows [facility] (Phone: [phone] Fax: [phone]), [facility] Phone: ([phone]) Fax: ([phone]) and [facility] (Main: [phone] Admissions: [phone] Fax: [phone]).[LS1.1]  The patient has been accepted at [facility] 6/24/18.  Transportation will be provided by daughter.[LS1.2]    PLAN[LS1.1]    [facility][LS1.2]      Discharge Planner   Discharge Plans in progress: TCU  Barriers to discharge plan: Medical stability  Follow up plan: Follow up with orthopedics       Entered by: Marlin Garcia 06/22/2018 1:27 PM           Marlin Garcia RN, Care Coordinator [phone][LS1.1]         Revision History        User Key Date/Time User Provider Type Action    > LS1.2 6/22/2018  3:01 PM Marlin Garcia RN Case Manager Sign     LS1.1 6/22/2018  1:27 PM Marlin Garcia RN Case Manager                      Progress Notes - Physician (Notes from 06/21/18 through 06/24/18)       Progress Notes by Antony Laird MD at 6/24/2018 10:52 AM     Author:  Antony Laird MD Service:  (none) Author Type:  Physician    Filed:  6/24/2018 10:57 AM Date of Service:  6/24/2018 10:52 AM Creation Time:  6/24/2018 10:52 AM    Status:  Signed :  Antony Laird MD (Physician)         [facility]    Hospital Medicine Progress Note  Date of Service:[OA1.1] 06/24/2018    Assessment & Plan[OA1.2]   Aldo Castillo is a 58 year old male who presented on 6/21/2018 for scheduled Procedure(s):  ARTHROPLASTY HIP by Dain Lawrence MD and is being followed by the hospital medicine service for co-management of acute and/or chronic perioperative medical problems.    S/p Procedure(s):  ARTHROPLASTY HIP  - POD # 3  - pain control, wound cares, physical therapy, occupational therapy and DVT prophylaxis per orthopedic surgery service[OA1.1]    Active Problems:    Type 2 diabetes mellitus with hyperglycemia, without long-term current use of insulin (H)    Morbid obesity, unspecified obesity type (H)    Dyslipidemia    S/P total hip arthroplasty, right    Sciatic leg pain    HTN (hypertension)[OA1.2]      DVT Prophylaxis: as per orthopedic surgery service -[OA1.1] Aspirin[OA1.3]   Code Status:[OA1.1] Prior[OA1.2]    Lines: None  Varghese catheter: None    Discussion: Medically, the patient appears stable     Disposition: Anticipate discharge Today to Nursing home      Attestation:[OA1.1]  I have reviewed today's vital signs, notes, medications, labs and imaging.  Amount of time performed on this daily note: 20 minutes.[OA1.3]    Antony Laird[OA1.4] MD[OA1.1]         Interval History[OA1.2]   Doing well.[OA1.1]     Physical Exam   Temp:  [97.5  F (36.4  C)-98  F (36.7  C)] 97.5  F (36.4  C)  Pulse:  [103] 103  Heart Rate:  [] 80  Resp:  [18-20] 18  BP: (132-142)/(75-88) 132/88  SpO2:  [91 %-98 %] 95 %[OA1.2]    Weights:[OA1.1]    Vitals:    06/21/18 1218 06/21/18 1230   Weight: 136.2 kg (300 lb 4.3 oz) 136.1 kg (300 lb)    Body mass index is 41.84 kg/(m^2).[OA1.2]    General: alert and oriented x4, in no acute distress  CV: Regular rate/rhythm, no appreciable murmur, rub, or gallop  Respiratory: CTA bilaterally, equal chest expansion  GI: Soft, nontender, normoactive S  Skin: Warm and dry  Musculoskeletal:[OA1.1] -ve[OA1.3] homans bilaterally.[OA1.1]  No tenderness[OA1.3]  to palpation of posterior calves.[OA1.1] No[OA1.3]  edema to lower extremities.  Neuro: equal  strength[OA1.1]    Data     Recent Labs  Lab 06/23/18  0609 06/22/18  0654   HGB 14.9 15.5   NA  --  136   POTASSIUM  --  4.4   CHLORIDE  --  103   CO2  --  24   BUN  --  17   CR  --  0.78   ANIONGAP  --  9   RADHA  --  8.9   GLC  --  170*         Recent Labs  Lab 06/24/18  0610 06/24/18  0200 06/23/18  2124 06/23/18  1623 06/23/18  1105 06/23/18  0622  06/22/18  0654   GLC  --   --   --   --   --   --   --  170*   * 175* 159* 134* 182* 162*  < >  --    < > = values in this interval not displayed.     Unresulted Labs Ordered in the Past 30 Days of this Admission     No orders found from 4/22/2018 to 6/22/2018.[OA1.2]           Imaging[OA1.1]  No results found for this or any previous visit (from the past 24 hour(s)).[OA1.2]     I reviewed all new labs and imaging results over the last 24 hours. I personally reviewed no images or EKG's today.[OA1.1]    Medications       acetaminophen  975 mg Oral Q8H     aspirin  325 mg Oral Daily     atorvastatin (LIPITOR) tablet 40 mg  40 mg Oral Daily at 8 pm     glimepiride  1 mg Oral QAM AC     insulin aspart  1-10 Units Subcutaneous TID AC     insulin aspart  1-7 Units Subcutaneous At Bedtime     metFORMIN (GLUCOPHAGE) tablet 1,000 mg  1,000 mg Oral BID w/meals     senna-docusate  1 tablet Oral BID    Or     senna-docusate  2 tablet Oral BID     sodium chloride (PF)  3 mL Intracatheter Q8H[OA1.2]   Olutoyin Enitan Akintola[OA1.4]  MD[OA1.1]             Revision History        User Key Date/Time User Provider Type Action    > OA1.2 6/24/2018 10:57 AM Antony Laird MD Physician Sign     OA1.3 6/24/2018 10:56 AM Antony Laird MD Physician      OA1.4 6/24/2018 10:53 AM Antony Laird MD Physician      OA1.1 6/24/2018 10:52 AM Antony Laird MD Physician             Progress Notes by Annalee Luong LICSW at 6/24/2018 10:14 AM     Author:  Annalee Luong LICSW Service:  (none) Author Type:      Filed:  6/24/2018 10:15 AM Date of Service:  6/24/2018 10:14 AM Creation Time:  6/24/2018 10:14 AM    Status:  Signed :  Annalee Luong LICSW ()         Name: Aldo Castillo    MRN#: [id number]    Reason for Hospitalization: right hip arthritis  S/P total hip arthroplasty    Discharge Date: 6/24/2018    Patient / Family response to discharge plan: pt is discharging today to [facility] (Main: [phone] Admissions: [phone] Fax: [phone]) at 1300 via dtr transport. All in agreement.     Other Providers (Care Coordinator, County Services, PCA services etc): No    CTS Hand Off Completed: Not needed    MALICK Score: low    Future Appointments: No future appointments.    Discharge Disposition: transitional care unit    Annalee Luong [place], ELISE, [facility] [phone][AK1.1]         Revision History        User Key Date/Time User Provider Type Action    > AK1.1 6/24/2018 10:15 AM Annalee Luong LICSW  Sign            Progress Notes by Dain Lawrence MD at 6/24/2018  9:00 AM     Author:  Dain Lawrence MD Service:  Orthopedics Author Type:  Physician    Filed:  6/24/2018  9:01 AM Date of Service:  6/24/2018  9:00 AM Creation Time:  6/24/2018  9:00 AM    Status:  Signed :  Dain Lawrence MD (Physician)         Ortho    No acute events overnight.  Both hip and sciatic pain improved today  Hoping to go to [place] today  then ex wife's single level home in [place] for a few weeks  House in [place] where he lives has 2-3 stories and he doesn't have help there    AFVSS  Pleasant, NAD  Nonlabored breathing  Right LE: Dressing cdi.  Fires ehl/fhl/gsc/ta c SILT dp/sp/tib n.  Toes warm    IMPRESSION:   1.  s/p right MARISOL 6.21.18    PLAN:   --WBAT RLE with PT for mobilization.     --DVT prophylaxis: ASA 325mg daily x 6 weeks   --[place] today.  Follow up in 2 weeks for wound check    Dain Lawrence MD[EP1.1]           Revision History        User Key Date/Time User Provider Type Action    > EP1.1 6/24/2018  9:01 AM Dian Lawrence MD Physician Sign            Progress Notes by Antony Laird MD at 6/23/2018  4:00 PM     Author:  Antony Laird MD Service:  (none) Author Type:  Physician    Filed:  6/23/2018  4:11 PM Date of Service:  6/23/2018  4:00 PM Creation Time:  6/23/2018  4:00 PM    Status:  Signed :  Antony Laird MD (Physician)         [facility]    Hospital Medicine Progress Note  Date of Service: 06/23/2018    Assessment & Plan   Aldo Castillo is a 58 year old male who presented on 6/21/2018 for scheduled Procedure(s):  ARTHROPLASTY HIP by Dain Lawrence MD and is being followed by the hospital medicine service for co-management of acute and/or chronic perioperative medical problems.    S/p Procedure(s):  ARTHROPLASTY HIP[OA1.1] RIGHT[OA1.2]   - POD # 2  - pain control, wound cares, physical therapy, occupational therapy and DVT prophylaxis per orthopedic surgery service    Active Problems:    Type 2 diabetes mellitus with hyperglycemia, without long-term current use of insulin (H)    Morbid obesity, unspecified obesity type (H)    Dyslipidemia    S/P total hip arthroplasty, right    Sciatic leg pain    HTN (hypertension)      DVT Prophylaxis: as per orthopedic surgery service -[OA1.1] aspirin[OA1.2]  Code Status: Full  Code    Lines:[OA1.1] none[OA1.2]   Varghese catheter:[OA1.1] none[OA1.2]    Discussion: Medically, the patient appears[OA1.1] stable, discussed his blood sugars with him. Running a bit high. Last A1c was 6.9 . He is on high dose sliding scale.[OA1.2]     Disposition: Anticipate discharge[OA1.1] tomorrow to TCU[OA1.2]      Attestation:[OA1.1]  I have reviewed today's vital signs, notes, medications, labs and imaging.  Amount of time performed on this daily note: 20 minutes.[OA1.2]  Antony Laird[OA1.3] MD[OA1.2]         Interval History[OA1.1]   Doing relatively well.[OA1.2]     Physical Exam   Temp:  [97.6  F (36.4  C)-97.9  F (36.6  C)] 97.9  F (36.6  C)  Pulse:  [] 103  Heart Rate:  [107] 107  Resp:  [20] 20  BP: (130-147)/(68-77) 133/75  SpO2:  [91 %-98 %] 91 %    Weights:   Vitals:    06/21/18 1218 06/21/18 1230   Weight: 136.2 kg (300 lb 4.3 oz) 136.1 kg (300 lb)    Body mass index is 41.84 kg/(m^2).    General: alert and oriented x4, in no acute distress  CV: Regular rate/rhythm, no appreciable murmur, rub, or gallop  Respiratory: CTA bilaterally, equal chest expansion  GI: Soft, nontender, normoactive S  Skin: Warm and dry  Musculoskeletal:[OA1.1] Negative[OA1.2]  homans bilaterally.[OA1.1]  No pain[OA1.2]  to palpation of posterior calves.[OA1.1] No[OA1.2]  edema to lower extremities.  Neuro: equal  strength    Data     Recent Labs  Lab 06/23/18  0609 06/22/18  0654   HGB 14.9 15.5   NA  --  136   POTASSIUM  --  4.4   CHLORIDE  --  103   CO2  --  24   BUN  --  17   CR  --  0.78   ANIONGAP  --  9   RADHA  --  8.9   GLC  --  170*         Recent Labs  Lab 06/23/18  1105 06/23/18  0622 06/23/18  0215 06/22/18  2138 06/22/18  1653 06/22/18  1153  06/22/18  0654   GLC  --   --   --   --   --   --   --  170*   * 162* 164* 135* 144* 171*  < >  --    < > = values in this interval not displayed.     Unresulted Labs Ordered in the Past 30 Days of this Admission     No orders found from 4/22/2018  "to 6/22/2018.           Imaging  No results found for this or any previous visit (from the past 24 hour(s)).     I reviewed all new labs and imaging results over the last 24 hours. I personally reviewed[OA1.1] no images or EKG's today[OA1.2].    Medications       acetaminophen  975 mg Oral Q8H     aspirin  325 mg Oral Daily     atorvastatin (LIPITOR) tablet 40 mg  40 mg Oral Daily at 8 pm     glimepiride  1 mg Oral QAM AC     insulin aspart  1-10 Units Subcutaneous TID AC     insulin aspart  1-7 Units Subcutaneous At Bedtime     metFORMIN (GLUCOPHAGE) tablet 1,000 mg  1,000 mg Oral BID w/meals     senna-docusate  1 tablet Oral BID    Or     senna-docusate  2 tablet Oral BID     sodium chloride (PF)  3 mL Intracatheter Q8H[OA1.1]   Antony Laird[OA1.3] MD[OA1.2]             Revision History        User Key Date/Time User Provider Type Action    > OA1.3 6/23/2018  4:11 PM Antony Laird MD Physician Sign     OA1.2 6/23/2018  4:09 PM Antony Laird MD Physician      OA1.1 6/23/2018  4:00 PM Antony Laird MD Physician             Progress Notes by Dain Lawrence MD at 6/23/2018  9:50 AM     Author:  Dain Lawrence MD Service:  Orthopedics Author Type:  Physician    Filed:  6/23/2018  9:52 AM Date of Service:  6/23/2018  9:50 AM Creation Time:  6/23/2018  9:50 AM    Status:  Signed :  Dain Lawrence MD (Physician)         Ortho    No acute events overnight.  Fairly sore yesterday but feeling better today  Getting up with therapy and doing well  Also having quite a bit of \"sciatic pain\" which is gradually improved today    AFVSS  Pleasant, NAD  Nonlabored breathing  Right LE: Dressing cdi.  Fires ehl/fhl/gsc/ta c SILT dp/sp/tib n.  Toes warm    IMPRESSION:   1.  s/p right MARISOL 6.21.18    PLAN:   --WBAT RLE with PT for mobilization.     --DVT prophylaxis: ASA 325mg daily x 6 weeks   --Likely dc tomorrow to TCU    Dain Lawrence MD[EP1.1]           " Revision History        User Key Date/Time User Provider Type Action    > EP1.1 6/23/2018  9:52 AM Dain Lawrence MD Physician Sign            Progress Notes by Deisi Pickett PA-C at 6/22/2018  4:51 PM     Author:  Deisi Pickett PA-C Service:  Hospitalist Author Type:  Physician Assistant - C    Filed:  6/22/2018  5:04 PM Date of Service:  6/22/2018  4:51 PM Creation Time:  6/22/2018  4:51 PM    Status:  Signed :  Deisi Pickett PA-C (Physician Assistant - BLAKE)         [facility]    Hospital Medicine Progress Note  Date of Service:[KW1.1] 06/22/2018    Assessment & Plan[KW1.2]   Aldo Castillo is a 58 year old male who presented on 6/21/2018 for scheduled Procedure(s):  ARTHROPLASTY HIP by Dain Lawrence MD and is being followed by the hospital medicine service for co-management of acute and/or chronic perioperative medical problems.    S/p Procedure(s):  ARTHROPLASTY HIP[KW1.1]  S/P total hip arthroplasty, right[KW1.3]  1 Day Post-Op[KW1.2]    - pain control, wound cares, physical therapy, occupational therapy and DVT prophylaxis per orthopedic surgery service[KW1.1]      Type 2 diabetes mellitus with hyperglycemia, without long-term current use of insulin (H)[KW1.3]  Most recent HgbA1c was 6.9 on pre-op exam. Glucose between 170-190 postoperatively. Taking glimepiride and metformin prior to admission.  - Continue prior to admission glimepiride and metformin  - Cover with additional high dose sliding scale insulin  - Hyper/hypoglycemia protocol[KW1.1]      HTN (hypertension)[KW1.3]  No known previous diagnosis of essential hypertension. Patient reports pressure is usually around 115/80. Post-operatively, SBP running between 130- 170. Possibly related to stress and post-op pain.  - Prn hydralazine available for SBP > 165[KW1.1]      Morbid obesity, unspecified obesity type (H)[KW1.3]  BMP 41.84.[KW1.1]      Dyslipidemia[KW1.3]  Taking  "Lipitor prior to admission, continue[KW1.1]    Sciatic leg pain[KW1.3]  Chronic secondary to a disc problem at L5. Patient continues to have preexisting episodes of sciatic pain, which he stats is more painful than the hip pain. Defer to outpatient management.        DVT Prophylaxis: as per orthopedic surgery service - Pneumatic Compression Devices  Code Status:[KW1.1] Full Code[KW1.2]    Lines: Peripheral   Varghese catheter: Not indicated    Discussion: Medically, the patient appears stable.    Disposition: Anticipate discharge 1-2 days to TCU     Attestation:  I have reviewed today's vital signs, notes, medications, labs and imaging.    Deisi Pickett PA-C  [facility]  Pager: [phone][KW1.1]       Interval History[KW1.2]   Patient feeling \"OK\" after surgery. Hip is stiff and sore, pain rated 5/10. He has preexisting sciatic pain from a vertebral problem at L5, continues to have sciatic pain that \"is more painful than my hip pain.\" Did well with therapy, although developed some acute dizziness after walking for a while, needed to sit down. No falls or syncopal episode. Dizziness has not reoccurred.    Tolerating oral intake, no nausea or vomiting. No bowel movement, but is passing flatus. Urinating normally.    Plan is to discharge to TCU on 6/24. Patient lives alone in [place], [place].    Denies chest pain, palpitations, cough, wheeze, shortness of breath, abdominal pain, numbness, or tingling.[KW1.1]      Physical Exam   Temp:  [96.4  F (35.8  C)-98.3  F (36.8  C)] 96.4  F (35.8  C)  Pulse:  [76-87] 87  Heart Rate:  [81-93] 83  Resp:  [7-24] 18  BP: (105-169)/(54-87) 142/82  SpO2:  [93 %-98 %] 95 %[KW1.2]    Weights:[KW1.1]   Vitals:    06/21/18 1218 06/21/18 1230   Weight: 136.2 kg (300 lb 4.3 oz) 136.1 kg (300 lb)    Body mass index is 41.84 kg/(m^2).[KW1.2]    General appearance: Awake, alert, and in no apparent distress. Pleasant and conversational, speaking in full " sentences.  HEENT: Moist mucus membranes, no exudate. No scleral icterus.  CV: Regular rhythm & rate, no murmurs. No edema. Peripheral pulses intact.  Respiratory: Moving air well bilaterally, no wheezing, crackles, or rhonchi.  GI: Non-distended, soft, nontender to palpation. No rebound or guarding. Normoactive bowel sounds.  Skin: Warm, dry, no rashes or ecchymoses. No mottling of skin. Incision at right hip with intact bandage, clean and dry. No exudate or erythema.  Musculoskeletal / extremities: Moves all extremities equally, no obvious abnormalities.  Neurologic: No focal deficits.[KW1.1]    Data     Recent Labs  Lab 06/22/18  0654   HGB 15.5      POTASSIUM 4.4   CHLORIDE 103   CO2 24   BUN 17   CR 0.78   ANIONGAP 9   RADHA 8.9   *         Recent Labs  Lab 06/22/18  1153 06/22/18  1036 06/22/18  0654 06/22/18  0636 06/22/18  0139 06/21/18  2140 06/21/18  1744   GLC  --   --  170*  --   --   --   --    * 170*  --  169* 191* 178* 130*        Unresulted Labs Ordered in the Past 30 Days of this Admission     No orders found for last 61 day(s).[KW1.2]           Imaging[KW1.1]  Recent Results (from the past 24 hour(s))   XR Pelvis w Hip Port Right 1 View    Narrative    XR PELVIS AD HIP PORTABLE RIGHT 1 VIEW   6/21/2018 6:21 PM     HISTORY: Post Op Hip Arthroplasty;     COMPARISON: None.      Impression    IMPRESSION: A right hip arthroplasty has been performed. The  components appear to be in proper position. No postoperative  complications are identified.    WILBERT CORMIER MD[KW1.2]        I reviewed all new labs and imaging results over the last 24 hours. I personally reviewed no images or EKG's today.[KW1.1]    Medications       acetaminophen  975 mg Oral Q8H     aspirin  325 mg Oral Daily     atorvastatin (LIPITOR) tablet 40 mg  40 mg Oral Daily at 8 pm     glimepiride  1 mg Oral QAM AC     insulin aspart  1-10 Units Subcutaneous TID AC     insulin aspart  1-7 Units Subcutaneous At Bedtime      "metFORMIN (GLUCOPHAGE) tablet 1,000 mg  1,000 mg Oral BID w/meals     senna-docusate  1 tablet Oral BID    Or     senna-docusate  2 tablet Oral BID     sodium chloride (PF)  3 mL Intracatheter Q8H[KW1.2]       Deisi Pickett PA-C  [facility]  Pager: [phone][KW1.1]                  Revision History        User Key Date/Time User Provider Type Action    > KW1.2 6/22/2018  5:04 PM Deisi Pickett PA-C Physician Assistant - C Sign     KW1.3 6/22/2018  4:52 PM Deisi Pickett PA-C Physician Assistant - C      KW1.1 6/22/2018  4:51 PM Deisi Pickett PA-C Physician Assistant Mamta LOZANO             Progress Notes by Patricia Marte OT at 6/22/2018  3:17 PM     Author:  Patricia Marte OT Service:  (none) Author Type:  Occupational Therapist    Filed:  6/22/2018  3:17 PM Date of Service:  6/22/2018  3:17 PM Creation Time:  6/22/2018  3:17 PM    Status:  Signed :  Patricia Marte OT (Occupational Therapist)          06/22/18 1500   Quick Adds   Type of Visit Initial Occupational Therapy Evaluation   Living Environment   Lives With alone   Living Arrangements house   Home Accessibility stairs to enter home;stairs within home;tub/shower is not walk in   Number of Stairs to Enter Home 3   Number of Stairs Within Home 15   Stair Railings at Home outside, present at both sides;inside, present on right side   Transportation Available family or friend will provide   Functional Level Prior   Ambulation 1-->assistive equipment  (axillary crutches)   Transferring 0-->independent   Toileting 0-->independent   Bathing 0-->independent   Dressing 0-->independent   Eating 0-->independent   Communication 0-->understands/communicates without difficulty   Swallowing 0-->swallows foods/liquids without difficulty   Cognition 0 - no cognition issues reported  (\"my short term memory is not great\")   Fall history within last six months no   Which of the above functional risks had a recent " onset or change? ambulation;transferring;toileting;bathing;dressing   General Information   Onset of Illness/Injury or Date of Surgery - Date 06/21/18   Referring Physician Dain Lawrence MD   Patient/Family Goals Statement To go to TCU upon discharge.    Additional Occupational Profile Info/Pertinent History of Current Problem s/p R MARISOL   Precautions/Limitations right hip precautions   Weight-Bearing Status - RLE weight-bearing as tolerated   Cognitive Status Examination   Orientation orientation to person, place and time   Level of Consciousness alert   Pain Assessment   Patient Currently in Pain Yes, see Vital Sign flowsheet   Transfer Skill: Sit to Stand   Level of [place]: Sit/Stand contact guard   Physical Assist/Nonphysical Assist: Sit/Stand 1 person assist   Transfer Skill: Sit to Stand weight-bearing as tolerated   Assistive Device for Transfer: Sit/Stand rolling walker   Transfer Skill: Toilet Transfer   Level of [place]: Toilet contact guard   Physical Assist/Nonphysical Assist: Toilet 1 person assist   Weight-Bearing Restrictions: Toilet weight-bearing as tolerated   Assistive Device rolling walker   Upper Body Dressing   Level of [place]: Dress Upper Body independent   Lower Body Dressing   Level of [place]: Dress Lower Body stand-by assist   Physical Assist/Nonphysical Assist: Dress Lower Body 1 person assist   Assistive Device reacher;sock-aid   Instrumental Activities of Daily Living (IADL)   Previous Responsibilities meal prep;housekeeping;laundry;shopping   Activities of Daily Living Analysis   Impairments Contributing to Impaired Activities of Daily Living pain;post surgical precautions   General Therapy Interventions   Planned Therapy Interventions ADL retraining   Clinical Impression   Criteria for Skilled Therapeutic Interventions Met yes, treatment indicated   OT Diagnosis decreased independence with ADLs and functional mobility   Influenced by the following  "impairments hip precautions, pain, impaired balance.    Assessment of Occupational Performance 5 or more Performance Deficits   Identified Performance Deficits LB dressing, toileting, showering, functional mobility, home management tasks.    Clinical Decision Making (Complexity) Low complexity   Therapy Frequency daily   Predicted Duration of Therapy Intervention (days/wks) 2-3 days   Anticipated Equipment Needs at Discharge (TBD at TCU)   Anticipated Discharge Disposition Transitional Care Facility   Risks and Benefits of Treatment have been explained. Yes   Patient, Family & other staff in agreement with plan of care Yes   [facility] AM-PAC  \"6 Clicks\" Daily Activity Inpatient Short Form   1. Putting on and taking off regular lower body clothing? 3 - A Little   2. Bathing (including washing, rinsing, drying)? 3 - A Little   3. Toileting, which includes using toilet, bedpan or urinal? 3 - A Little   4. Putting on and taking off regular upper body clothing? 4 - None   5. Taking care of personal grooming such as brushing teeth? 4 - None   6. Eating meals? 4 - None   Daily Activity Raw Score (Score out of 24.Lower scores equate to lower levels of function) 21   Total Evaluation Time   Total Evaluation Time (Minutes) 8[LC1.1]        Revision History        User Key Date/Time User Provider Type Action    > LC1.1 6/22/2018  3:17 PM Patricia Marte OT Occupational Therapist Sign            Progress Notes by Haroldo Rothman at 6/22/2018  3:16 PM     Author:  Haroldo Rothman Service:  (none) Author Type:  Coordinator    Filed:  6/22/2018  3:16 PM Date of Service:  6/22/2018  3:16 PM Creation Time:  6/22/2018  3:16 PM    Status:  Signed :  Haroldo Rothman (Coordinator)         Your information has been submitted on June 22nd, 2018 at 03:16:27 PM CDT. The confirmation number is [id number][BB1.1]       Revision History        User Key Date/Time User Provider Type Action    > BB1.1 6/22/2018  3:16 PM " Haroldo Rothman Coordinator Sign            Progress Notes by Yoli Gamino PT at 6/22/2018 12:33 PM     Author:  Yoli Gamino PT Service:  (none) Author Type:  Physical Therapist    Filed:  6/22/2018 12:34 PM Date of Service:  6/22/2018 12:33 PM Creation Time:  6/22/2018 12:33 PM    Status:  Signed :  Yoli Gamino PT (Physical Therapist)          06/22/18 1000   Quick Adds   Type of Visit Initial PT Evaluation   Living Environment   Lives With alone   Living Arrangements house   Home Accessibility stairs to enter home;stairs within home   Number of Stairs to Enter Home 3   Number of Stairs Within Home 15   Stair Railings at Home outside, present at both sides;inside, present on right side   Transportation Available family or friend will provide   Functional Level Prior   Ambulation 1-->assistive equipment   Transferring 0-->independent   Toileting 0-->independent   Bathing 0-->independent   Dressing 0-->independent   Eating 0-->independent   Communication 0-->understands/communicates without difficulty   Swallowing 0-->swallows foods/liquids without difficulty   Cognition 0 - no cognition issues reported   Fall history within last six months no   Which of the above functional risks had a recent onset or change? none   Prior Functional Level Comment PLOf- Pt indep. with ambulation using crutches.    General Information   Onset of Illness/Injury or Date of Surgery - Date 06/21/18   Referring Physician Howard   Patient/Family Goals Statement Pt reports eventual goal of Dc to home; anticipates TCU stay   Pertinent History of Current Problem (include personal factors and/or comorbidities that impact the POC) Right hip primary degenerative joint disease; s/p  Right total hip arthroplasty   Precautions/Limitations right hip precautions   Weight-Bearing Status - RLE weight-bearing as tolerated   General Observations Pt alert- reports pain at 3/10   Cognitive Status Examination   Orientation orientation to  person, place and time   Level of Consciousness alert   Follows Commands and Answers Questions able to follow multistep instructions   Personal Safety and Judgment intact   Pain Assessment   Patient Currently in Pain Yes, see Vital Sign flowsheet   Range of Motion (ROM)   ROM Comment WFL adhering to precautions   Strength   Strength Comments Unable to SLR on R   MMT: Hip, Rehab Eval   Hip ADduction - Right Side (full gravity elim. strength, NT further)   MMT: Knee, Rehab Eval   Knee Flexion - Right Side (3/5) fair, right   Knee Extension - Right Side (3/5) fair, right   Bed Mobility   Bed Mobility Comments Min. assistance supine> sitting   Transfer Skills   Transfer Comments Pt instructed onTHA precautions w/ mobility. demonstrated transfer technique, discuseed sitting surfaces.    Gait   Gait Comments Pt instructed on WBAT,gait sequence-  amb. 5 feet x1 with RW, SBA . Pt then c/o dizziness and felt his blood sugar was low, returned to sit at EOB. Nursing checked and blood sugar 178; rested and then amb 10 feet x1 with RW, SBA.    Gait Analysis   Gait Pattern Used swing-to gait   Balance   Balance Comments good dynamic standing balance    Sensory Examination   Sensory Perception no deficits were identified   General Therapy Interventions   Planned Therapy Interventions bed mobility training;gait training;ROM;strengthening;transfer training;home program guidelines   Clinical Impression   Criteria for Skilled Therapeutic Intervention yes, treatment indicated   PT Diagnosis Right total hip arthroplasty   Influenced by the following impairments Pain, decreased strength R LE   Functional limitations due to impairments altered mobility- decreased indep. w/ be mobility, transfers,gait   Clinical Presentation Stable/Uncomplicated   Clinical Presentation Rationale clincial judgement   Clinical Decision Making (Complexity) Low complexity   Therapy Frequency` 2 times/day   Predicted Duration of Therapy Intervention (days/wks)  "2 days   Anticipated Equipment Needs at Discharge front wheeled walker   Anticipated Discharge Disposition Transitional Care Facility   Risk & Benefits of therapy have been explained Yes   Patient, Family & other staff in agreement with plan of care Yes   Clinical Impression Comments mulitple comorbitities; does struggle w/ mobility; would benefit fromTCU stay to ensure  independence    [facility] AM-PAC  \"6 Clicks\" V.2 Basic Mobility Inpatient Short Form   1. Turning from your back to your side while in a flat bed without using bedrails? 3 - A Little   2. Moving from lying on your back to sitting on the side of a flat bed without using bedrails? 3 - A Little   3. Moving to and from a bed to a chair (including a wheelchair)? 3 - A Little   4. Standing up from a chair using your arms (e.g., wheelchair, or bedside chair)? 3 - A Little   5. To walk in hospital room? 3 - A Little   6. Climbing 3-5 steps with a railing? 2 - A Lot   Basic Mobility Raw Score (Score out of 24.Lower scores equate to lower levels of function) 17   Total Evaluation Time   Total Evaluation Time (Minutes) 10[CS1.1]        Revision History        User Key Date/Time User Provider Type Action    > CS1.1 6/22/2018 12:34 PM Yoli Gamino, PT Physical Therapist Sign            Progress Notes by Gretchen Lozano PA-C at 6/22/2018 11:56 AM     Author:  Gretchen Lozano PA-C Service:  Orthopedics Author Type:  Physician Assistant - BLAKE    Filed:  6/22/2018 12:00 PM Date of Service:  6/22/2018 11:56 AM Creation Time:  6/22/2018 11:56 AM    Status:  Signed :  Gretchen Lozano PA-C (Physician Assistant - C)         [facility] Orthopaedics Progress Note      Post-operative Day: 1 Day Post-Op    Procedure(s):  Right Total Hip Arthroplasty - Wound Class: I-Clean      Subjective:    Pain: minimal  aching to R hip. Denies shooting pain, parasthesias, numbness and weakness.   denies Chest pain, SOB, O2 required: None  denies nausea/ " "emesis  reports lightheadedness, dizziness during PT this morning. States he is feeling better since sitting.   BM -, passing gas +. Urinating well, V[place] in place: no.       Objective:  Blood pressure 160/81, pulse 84, temperature 97.6  F (36.4  C), temperature source Oral, resp. rate 20, height 1.803 m (5' 11\"), weight 136.1 kg (300 lb), SpO2 93 %.    Patient Vitals for the past 24 hrs:   BP Temp Temp src Pulse Heart Rate Resp SpO2 Height Weight   06/22/18 1110 160/81 97.6  F (36.4  C) Oral 84 - 20 93 % - -   06/22/18 0801 163/83 97.6  F (36.4  C) Oral 76 - 20 96 % - -   06/22/18 0354 159/79 97.8  F (36.6  C) Oral 85 - 20 94 % - -   06/22/18 0146 - - - - - - 97 % - -   06/22/18 0006 167/73 98.3  F (36.8  C) Oral 84 - 18 98 % - -   06/21/18 2003 140/87 - - - 83 14 96 % - -   06/21/18 1945 165/83 - - - 88 - - - -   06/21/18 1930 169/71 - - - 87 - 96 % - -   06/21/18 1915 134/67 - - - 83 - 96 % - -   06/21/18 1900 127/60 - - - 84 16 96 % 1.803 m (5' 11\") -   06/21/18 1830 117/64 - - - 81 (!) 7 94 % - -   06/21/18 1815 115/75 - - - 86 10 94 % - -   06/21/18 1801 - - - - 87 17 94 % - -   06/21/18 1800 105/54 - - - 88 (!) 7 95 % - -   06/21/18 1745 110/66 98  F (36.7  C) Oral - 92 13 97 % - -   06/21/18 1736 - - - - 93 24 98 % - -   06/21/18 1734 112/62 - - - - - - - -   06/21/18 1230 158/78 98  F (36.7  C) Oral 103 - 20 97 % 1.803 m (5' 11\") 136.1 kg (300 lb)   06/21/18 1218 - - - - - - - 1.822 m (5' 11.73\") 136.2 kg (300 lb 4.3 oz)       Wt Readings from Last 4 Encounters:   06/21/18 136.1 kg (300 lb)   11/08/17 (!) 136.2 kg (300 lb 4.8 oz)   10/26/17 134.9 kg (297 lb 8 oz)   09/28/17 136 kg (299 lb 14.4 oz)     General: Alert and orientated. No apparent distress. Non-labored breathing. Appropriate affect.   MSK: R hip: dressing Clean, dry, and intact. Skin intact, no ecchymosis, no erythema. nontender to palpation to incision site. ROM appropriate for post op. Distal neurovascularly intact. Compartments soft and " "non-tender. No calf pain. Homans negative. PF/DF and EHL intact.       Pertinent Labs   Lab Results: personally reviewed.     Recent Labs   Lab Test  06/22/18   0654   HGB  15.5   NA  136         Procedure(s):  Right Total Hip Arthroplasty - Wound Class: I-Clean  Plan: Anticoagulation protocol:  mg daily  x 42  days            Pain medications:  oxycodone and tylenol            Weight bearing status:  WBAT            Dressing Change: Aquacel dressing to be left intact until follow up.            Disposition:  TCU on sunday             Follow up: 2 week with LAURO            Continue cares and rehabilitation     Report completed by:  Gretchen Lozano PA-C  Date: 6/22/2018  Time: 11:56 AM[TB1.1]       Revision History        User Key Date/Time User Provider Type Action    > TB1.1 6/22/2018 12:00 PM Gretchen Lozano PA-C Physician Assistant - C Sign            Progress Notes by Phyllis Petersen RN at 6/21/2018  8:04 PM     Author:  Phyllis Petersen RN Service:  (none) Author Type:  Registered Nurse    Filed:  6/21/2018  8:05 PM Date of Service:  6/21/2018  8:04 PM Creation Time:  6/21/2018  8:04 PM    Status:  Signed :  Phyllis Petersen RN (Registered Nurse)         [facility] ADMISSION NOTE    Patient admitted to room 2400 at approximately 1900 via cart from surgery. Patient was accompanied by transport tech.     Verbal SBAR report received from Lourdes MARKS prior to patient arrival.     Patient trasferred to bed via air tabatha. Patient alert and oriented X 3. The patient is not having any pain.  . Admission vital signs: Blood pressure 140/87, pulse 103, temperature 98  F (36.7  C), temperature source Oral, resp. rate 14, height 1.803 m (5' 11\"), weight 136.1 kg (300 lb), SpO2 96 %. Patient was oriented to plan of care, call light, bed controls, tv, telephone, bathroom and visiting hours.     Risk Assessment    The following safety risks were identified during admission: fall. Yellow risk band " applied: YES.     Skin Initial Assessment    This writer admitted this patient and completed a full skin assessment and Panda score in the Adult PCS flowsheet. Appropriate interventions initiated as needed.     Secondary skin check completed by Romeo MARKS.    Skin  Inspection of bony prominences: Full  Procedural focused assessment (identify areas inspected) :  (Right hip, leg, and foot)  Inspection under devices: Full  Skin WDL: WDL    Panda Risk Assessment  Sensory Perception: 4-->no impairment  Moisture: 4-->rarely moist  Activity: 3-->walks occasionally  Mobility: 3-->slightly limited  Nutrition: 3-->adequate  Friction and Shear: 3-->no apparent problem  Panda Score: 20  Bed Support Surface: Atmos Air mattress  Panda Intervention(s) Implemented: HOB elevated 30 degrees or less, draw sheets, encouraged fluids, heels suspended, patient education on pressure relief reinforced, repositioned/turned q2hr    Phyllis Petersen[KB1.1]       Revision History        User Key Date/Time User Provider Type Action    > KB1.1 6/21/2018  8:05 PM Phyllis Petersen RN Registered Nurse Sign            Progress Notes by Dain Lawrence MD at 6/21/2018  3:35 PM     Author:  Dain Lawrence MD Service:  Orthopedics Author Type:  Physician    Filed:  6/21/2018  5:13 PM Date of Service:  6/21/2018  3:35 PM Creation Time:  6/21/2018  3:35 PM    Status:  Signed :  Dain Lawrence MD (Physician)         06/21/18    PREOP DIAGNOSIS: Right hip primary degenerative joint disease  POSTOP DIAGNOSIS: Right hip primary degenerative joint disease  PROCEDURE: Right total hip arthroplasty  SURGEON: Dain Lawrence   ASSISTANT: Denae Bernstein.  The presence of a PA was necessary for positioning, retraction, and safe progression of the case  ANESTHESIA: Spinal with sedation   EBL:[EP1.1] 200[EP1.2]  COMPLICATIONS: None apparent   DISPO: Stable to PACU     IMPLANTS: DePuy[EP1.1] Corail[EP1.2]size[EP1.1] 12  standard[EP1.2] offset[EP1.1] collared[EP1.2] femur,[EP1.1] 36[EP1.2]mm x[EP1.1] +1.5[EP1.2]mm[EP1.1] ceramic[EP1.2] femoral head,[EP1.1] 36[EP1.2]mm x[EP1.1] 58[EP1.2]mm[EP1.1] neutral[EP1.2] polyethylene liner, and[EP1.1] 58[EP1.2] mm [place] Cup[EP1.1] with 6.5mm acetabular screws x 2[EP1.2]    INDICATIONS: Brett is a 58 year old male with a history of progressive right hip pain due to end stage arthritis.  After failing nonoperative management, he elected to proceed with a right MARISOL, understanding the risks of infection, damage to vessels or nerves, blood clots, instability, leg length discrepancy, ongoing pain and need for revision surgery.     PROCEDURE: Patient was met in the preoperative holding area where consent was reviewed and the right hip was marked.  He was then transferred to the operating theater. After a spinal anesthetic was placed, he was placed in a left lateral decubitus position with his right side up. All bony prominences were padded, he was secured with a Stulberg hip positioner, and an axillary roll was placed.  A timeout was performed verifying the correct patient, surgery, and location. He received preoperative antibiotics as well as transexamic acid. The right lower extremity was then prepped and draped in a standard fashion.     We then made an incision in line with a posterior approach to the hip. Dissection was sharply carried down through skin and subcutaneous tissue, and the gluteus fascia incised in line with the incision. After palpating and protecting the sciatic nerve, an east west retractor was placed. We then released the piriformis and short external rotators and then performed a T Capsulotomy. The hip was dislocated and a neck cut made, approximately[EP1.1] 4[EP1.2] mm proximal to the lesser trochanter. The femoral head measured[EP1.1] 53[EP1.2]mm and showed[EP1.1] end stage arthritic change[EP1.2].    We turned our attention to the acetabulum. After placing anterior and  posterior retractors, foveal and labral tissue were removed.  We started with a[EP1.1] 52[EP1.2]mm reamer and sequentially reamed to a[EP1.1] 57[EP1.2], in approximately[EP1.1] 45[EP1.2] deg of abduction and[EP1.1] 30[EP1.2] deg of anteversion. At that point, we had good bleeding bone circumferentially.  A trial cup was placed with good pressfit followed by our final[EP1.1] 58[EP1.2]mm cup, again with good press fit[EP1.1].  Given his size, fixation was supplemented with two 6.5mm screws both with good purchase.  We then placed[EP1.2] a neutral liner. We then turned our attention to the femur. After using a cookie cutter and canal finder, we broached to a size[EP1.1] 12[EP1.2], keeping the stem in approximatley 10-15 deg of anteversion. At that point, we had excellent rotational stability.  We then placed a standard offset neck with and a variety of different length femoral heads.  We felt the +[EP1.1]1.5[EP1.2]mm head most appropriate which showed leg lengths felt appropriate, the hip was stable in full extension and maximal external rotation, in the sleeping position, and with flexion to 90 degrees and internal rotation to[EP1.1] 70[EP1.2] degrees.     We then removed all trial components and thoroughly irrigated the wound. We placed our final size[EP1.1] 12[EP1.2]stem.  We then retrialed with a +[EP1.1]1.5[EP1.2]mm head.  Findings as above with flexion to 90 and IR to[EP1.1] 70[EP1.2].  We impacted our final[EP1.1] 1.5 mm head. The wound was instilled with a dilute betadine solution. Capsule and short external rotators were repaired with 0-ethibond, gluteus fascia with #1 stratafix, subdermal sutures with 2-0 vicryl, and a running 3-0 subcuticular monocryl. A sterile dressing followed by an abductor pillow was placed and the patient was transferred to the PACU in stable condition.       POSTOPERATIVE PLAN:   1. WBAT[EP1.2] right LE with posterior hip precautions   2. PT for mobilization   3. DVT prophylaxis: ASA  325mg daily x 6 weeks  4. Perioperative antibiotics     Dain Lawrence MD[EP1.1]       Revision History        User Key Date/Time User Provider Type Action    > EP1.2 6/21/2018  5:13 PM Dain Lawrence MD Physician Sign     EP1.1 6/21/2018  3:35 PM Dain Lawrence MD Physician                      Procedure Notes      Procedures by Dain Lawrence MD at 6/21/2018  7:28 PM     Author:  Dain Lawrence MD Service:  Orthopedics Author Type:  Physician    Filed:  6/21/2018  7:28 PM Date of Service:  6/21/2018  7:28 PM Creation Time:  6/21/2018  7:28 PM    Status:  Signed :  Dain Lawrence MD (Physician)         06/21/18     PREOP DIAGNOSIS: Right hip primary degenerative joint disease  POSTOP DIAGNOSIS: Right hip primary degenerative joint disease  PROCEDURE: Right total hip arthroplasty  SURGEON: Dain Lawrence   ASSISTANT: Denae Bernstein.  The presence of a PA was necessary for positioning, retraction, and safe progression of the case  ANESTHESIA: Spinal with sedation   EBL: 200  COMPLICATIONS: None apparent   DISPO: Stable to PACU      IMPLANTS: DePuy Corailsize 12 standard offset collared femur, 36mm x +1.5mm ceramic femoral head, 36mm x 58mm neutral polyethylene liner, and 58 mm Brownfield Cup with 6.5mm acetabular screws x 2     INDICATIONS: Brett is a 58 year old male with a history of progressive right hip pain due to end stage arthritis.  After failing nonoperative management, he elected to proceed with a right MARISOL, understanding the risks of infection, damage to vessels or nerves, blood clots, instability, leg length discrepancy, ongoing pain and need for revision surgery.      PROCEDURE: Patient was met in the preoperative holding area where consent was reviewed and the right hip was marked.  He was then transferred to the operating theater. After a spinal anesthetic was placed, he was placed in a left lateral decubitus position with his right side up. All bony  prominences were padded, he was secured with a Stulberg hip positioner, and an axillary roll was placed.  A timeout was performed verifying the correct patient, surgery, and location. He received preoperative antibiotics as well as transexamic acid. The right lower extremity was then prepped and draped in a standard fashion.      We then made an incision in line with a posterior approach to the hip. Dissection was sharply carried down through skin and subcutaneous tissue, and the gluteus fascia incised in line with the incision. After palpating and protecting the sciatic nerve, an east west retractor was placed. We then released the piriformis and short external rotators and then performed a T Capsulotomy. The hip was dislocated and a neck cut made, approximately 4 mm proximal to the lesser trochanter. The femoral head measured 53mm and showed end stage arthritic change.     We turned our attention to the acetabulum. After placing anterior and posterior retractors, foveal and labral tissue were removed.  We started with a 52mm reamer and sequentially reamed to a 57, in approximately 45 deg of abduction and 30 deg of anteversion. At that point, we had good bleeding bone circumferentially.  A trial cup was placed with good pressfit followed by our final 58mm cup, again with good press fit.  Given his size, fixation was supplemented with two 6.5mm screws both with good purchase.  We then placed a neutral liner. We then turned our attention to the femur. After using a cookie cutter and canal finder, we broached to a size 12, keeping the stem in approximatley 10-15 deg of anteversion. At that point, we had excellent rotational stability.  We then placed a standard offset neck with and a variety of different length femoral heads.  We felt the +1.5mm head most appropriate which showed leg lengths felt appropriate, the hip was stable in full extension and maximal external rotation, in the sleeping position, and with flexion  to 90 degrees and internal rotation to 70 degrees.      We then removed all trial components and thoroughly irrigated the wound. We placed our final size 12stem.  We then retrialed with a +1.5mm head.  Findings as above with flexion to 90 and IR to 70.  We impacted our final 1.5 mm head. The wound was instilled with a dilute betadine solution. Capsule and short external rotators were repaired with 0-ethibond, gluteus fascia with #1 stratafix, subdermal sutures with 2-0 vicryl, and a running 3-0 subcuticular monocryl. A sterile dressing followed by an abductor pillow was placed and the patient was transferred to the PACU in stable condition.         POSTOPERATIVE PLAN:   1. WBAT right LE with posterior hip precautions   2. PT for mobilization   3. DVT prophylaxis: ASA 325mg daily x 6 weeks  4. Perioperative antibiotics      Dain Lawrence MD[EP1.1]     Revision History        User Key Date/Time User Provider Type Action    > EP1.1 6/21/2018  7:28 PM Dain Lawrence MD Physician Sign                     Progress Notes - Therapies (Notes from 06/21/18 through 06/24/18)      Progress Notes by Patricia Marte OT at 6/22/2018  3:17 PM     Author:  Patricia Marte OT Service:  (none) Author Type:  Occupational Therapist    Filed:  6/22/2018  3:17 PM Date of Service:  6/22/2018  3:17 PM Creation Time:  6/22/2018  3:17 PM    Status:  Signed :  Patricia Marte OT (Occupational Therapist)          06/22/18 1500   Quick Adds   Type of Visit Initial Occupational Therapy Evaluation   Living Environment   Lives With alone   Living Arrangements house   Home Accessibility stairs to enter home;stairs within home;tub/shower is not walk in   Number of Stairs to Enter Home 3   Number of Stairs Within Home 15   Stair Railings at Home outside, present at both sides;inside, present on right side   Transportation Available family or friend will provide   Functional Level Prior   Ambulation 1-->assistive  "equipment  (axillary crutches)   Transferring 0-->independent   Toileting 0-->independent   Bathing 0-->independent   Dressing 0-->independent   Eating 0-->independent   Communication 0-->understands/communicates without difficulty   Swallowing 0-->swallows foods/liquids without difficulty   Cognition 0 - no cognition issues reported  (\"my short term memory is not great\")   Fall history within last six months no   Which of the above functional risks had a recent onset or change? ambulation;transferring;toileting;bathing;dressing   General Information   Onset of Illness/Injury or Date of Surgery - Date 06/21/18   Referring Physician Dain Lawrence MD   Patient/Family Goals Statement To go to TCU upon discharge.    Additional Occupational Profile Info/Pertinent History of Current Problem s/p R MARISOL   Precautions/Limitations right hip precautions   Weight-Bearing Status - RLE weight-bearing as tolerated   Cognitive Status Examination   Orientation orientation to person, place and time   Level of Consciousness alert   Pain Assessment   Patient Currently in Pain Yes, see Vital Sign flowsheet   Transfer Skill: Sit to Stand   Level of [place]: Sit/Stand contact guard   Physical Assist/Nonphysical Assist: Sit/Stand 1 person assist   Transfer Skill: Sit to Stand weight-bearing as tolerated   Assistive Device for Transfer: Sit/Stand rolling walker   Transfer Skill: Toilet Transfer   Level of [place]: Toilet contact guard   Physical Assist/Nonphysical Assist: Toilet 1 person assist   Weight-Bearing Restrictions: Toilet weight-bearing as tolerated   Assistive Device rolling walker   Upper Body Dressing   Level of [place]: Dress Upper Body independent   Lower Body Dressing   Level of [place]: Dress Lower Body stand-by assist   Physical Assist/Nonphysical Assist: Dress Lower Body 1 person assist   Assistive Device reacher;sock-aid   Instrumental Activities of Daily Living (IADL)   Previous " "Responsibilities meal prep;housekeeping;laundry;shopping   Activities of Daily Living Analysis   Impairments Contributing to Impaired Activities of Daily Living pain;post surgical precautions   General Therapy Interventions   Planned Therapy Interventions ADL retraining   Clinical Impression   Criteria for Skilled Therapeutic Interventions Met yes, treatment indicated   OT Diagnosis decreased independence with ADLs and functional mobility   Influenced by the following impairments hip precautions, pain, impaired balance.    Assessment of Occupational Performance 5 or more Performance Deficits   Identified Performance Deficits LB dressing, toileting, showering, functional mobility, home management tasks.    Clinical Decision Making (Complexity) Low complexity   Therapy Frequency daily   Predicted Duration of Therapy Intervention (days/wks) 2-3 days   Anticipated Equipment Needs at Discharge (TBD at TCU)   Anticipated Discharge Disposition Transitional Care Facility   Risks and Benefits of Treatment have been explained. Yes   Patient, Family & other staff in agreement with plan of care Yes   [facility] AM-PAC  \"6 Clicks\" Daily Activity Inpatient Short Form   1. Putting on and taking off regular lower body clothing? 3 - A Little   2. Bathing (including washing, rinsing, drying)? 3 - A Little   3. Toileting, which includes using toilet, bedpan or urinal? 3 - A Little   4. Putting on and taking off regular upper body clothing? 4 - None   5. Taking care of personal grooming such as brushing teeth? 4 - None   6. Eating meals? 4 - None   Daily Activity Raw Score (Score out of 24.Lower scores equate to lower levels of function) 21   Total Evaluation Time   Total Evaluation Time (Minutes) 8[LC1.1]        Revision History        User Key Date/Time User Provider Type Action    > LC1.1 6/22/2018  3:17 PM Patricia Marte, OT Occupational Therapist Sign            Progress Notes by Yoli Gamino, PT at 6/22/2018 12:33 PM     " Author:  Yoli Gamino PT Service:  (none) Author Type:  Physical Therapist    Filed:  6/22/2018 12:34 PM Date of Service:  6/22/2018 12:33 PM Creation Time:  6/22/2018 12:33 PM    Status:  Signed :  Yoli Gamino PT (Physical Therapist)          06/22/18 1000   Quick Adds   Type of Visit Initial PT Evaluation   Living Environment   Lives With alone   Living Arrangements house   Home Accessibility stairs to enter home;stairs within home   Number of Stairs to Enter Home 3   Number of Stairs Within Home 15   Stair Railings at Home outside, present at both sides;inside, present on right side   Transportation Available family or friend will provide   Functional Level Prior   Ambulation 1-->assistive equipment   Transferring 0-->independent   Toileting 0-->independent   Bathing 0-->independent   Dressing 0-->independent   Eating 0-->independent   Communication 0-->understands/communicates without difficulty   Swallowing 0-->swallows foods/liquids without difficulty   Cognition 0 - no cognition issues reported   Fall history within last six months no   Which of the above functional risks had a recent onset or change? none   Prior Functional Level Comment PLOf- Pt indep. with ambulation using crutches.    General Information   Onset of Illness/Injury or Date of Surgery - Date 06/21/18   Referring Physician Howard   Patient/Family Goals Statement Pt reports eventual goal of Dc to home; anticipates TCU stay   Pertinent History of Current Problem (include personal factors and/or comorbidities that impact the POC) Right hip primary degenerative joint disease; s/p  Right total hip arthroplasty   Precautions/Limitations right hip precautions   Weight-Bearing Status - RLE weight-bearing as tolerated   General Observations Pt alert- reports pain at 3/10   Cognitive Status Examination   Orientation orientation to person, place and time   Level of Consciousness alert   Follows Commands and Answers Questions able to follow  multistep instructions   Personal Safety and Judgment intact   Pain Assessment   Patient Currently in Pain Yes, see Vital Sign flowsheet   Range of Motion (ROM)   ROM Comment WFL adhering to precautions   Strength   Strength Comments Unable to SLR on R   MMT: Hip, Rehab Eval   Hip ADduction - Right Side (full gravity elim. strength, NT further)   MMT: Knee, Rehab Eval   Knee Flexion - Right Side (3/5) fair, right   Knee Extension - Right Side (3/5) fair, right   Bed Mobility   Bed Mobility Comments Min. assistance supine> sitting   Transfer Skills   Transfer Comments Pt instructed onTHA precautions w/ mobility. demonstrated transfer technique, discuseed sitting surfaces.    Gait   Gait Comments Pt instructed on WBAT,gait sequence-  amb. 5 feet x1 with RW, SBA . Pt then c/o dizziness and felt his blood sugar was low, returned to sit at EOB. Nursing checked and blood sugar 178; rested and then amb 10 feet x1 with RW, SBA.    Gait Analysis   Gait Pattern Used swing-to gait   Balance   Balance Comments good dynamic standing balance    Sensory Examination   Sensory Perception no deficits were identified   General Therapy Interventions   Planned Therapy Interventions bed mobility training;gait training;ROM;strengthening;transfer training;home program guidelines   Clinical Impression   Criteria for Skilled Therapeutic Intervention yes, treatment indicated   PT Diagnosis Right total hip arthroplasty   Influenced by the following impairments Pain, decreased strength R LE   Functional limitations due to impairments altered mobility- decreased indep. w/ be mobility, transfers,gait   Clinical Presentation Stable/Uncomplicated   Clinical Presentation Rationale clincial judgement   Clinical Decision Making (Complexity) Low complexity   Therapy Frequency` 2 times/day   Predicted Duration of Therapy Intervention (days/wks) 2 days   Anticipated Equipment Needs at Discharge front wheeled walker   Anticipated Discharge Disposition  "Transitional Care Facility   Risk & Benefits of therapy have been explained Yes   Patient, Family & other staff in agreement with plan of care Yes   Clinical Impression Comments mulitple comorbitities; does struggle w/ mobility; would benefit fromTCU stay to ensure  independence    [facility] AM-PAC  \"6 Clicks\" V.2 Basic Mobility Inpatient Short Form   1. Turning from your back to your side while in a flat bed without using bedrails? 3 - A Little   2. Moving from lying on your back to sitting on the side of a flat bed without using bedrails? 3 - A Little   3. Moving to and from a bed to a chair (including a wheelchair)? 3 - A Little   4. Standing up from a chair using your arms (e.g., wheelchair, or bedside chair)? 3 - A Little   5. To walk in hospital room? 3 - A Little   6. Climbing 3-5 steps with a railing? 2 - A Lot   Basic Mobility Raw Score (Score out of 24.Lower scores equate to lower levels of function) 17   Total Evaluation Time   Total Evaluation Time (Minutes) 10[CS1.1]        Revision History        User Key Date/Time User Provider Type Action    > CS1.1 6/22/2018 12:34 PM Yoli Gamino PT Physical Therapist Sign            "

## 2018-06-21 NOTE — IP AVS SNAPSHOT
MRN:[id number]                      After Visit Summary   6/21/2018    Aldo Castillo    MRN: [id number]           Thank you!     Thank you for choosing [facility] for your care. Our goal is always to provide you with excellent care. Hearing back from our patients is one way we can continue to improve our services. Please take a few minutes to complete the written survey that you may receive in the mail after you visit with us. Thank you!        Patient Information     Date Of Birth          1960        Designated Caregiver       Most Recent Value    Caregiver    Will someone help with your care after discharge? no      About your hospital stay     You were admitted on:  June 21, 2018 You last received care in the:  [facility]    You were discharged on:  June 24, 2018        Reason for your hospital stay       Right hip replacement 6.21.18                  Who to Call     For medical emergencies, please call 911.  For non-urgent questions about your medical care, please call your primary care provider or clinic, [phone]  For questions related to your surgery, please call your surgery clinic        Attending Provider     Provider Specialty    Dain Lawrence MD Orthopedics       Primary Care Provider Office Phone # Fax #    Jesi Bauer [phone]      After Care Instructions     Activity - Up with assistive device           Advance Diet as Tolerated       Follow this diet upon discharge: Regular            General info for SNF       Length of Stay Estimate: Short Term Care: Estimated # of Days <30  Condition at Discharge: Improving  Level of care:skilled   Rehabilitation Potential: Excellent  Admission H&P remains valid and up-to-date: Yes  Recent Chemotherapy: N/A  Use Nursing Home Standing Orders: Yes            Mantoux instructions       Give two-step Mantoux (PPD) Per Facility Policy Yes            Wound care (specify)        "Leave aquacel dressing in place.  It will be removed at 2 week post op appointment  Dressing is waterproof.  OK to shower and gently pat dry.                  Follow-up Appointments     Follow Up and recommended labs and tests       Follow up in 2 weeks  Call  to schedule or confirm appointment                  Additional Services     Occupational Therapy Adult Consult       Evaluate and treat as clinically indicated.    Reason:  S/p right total hip arthroplasty            Physical Therapy Adult Consult       Evaluate and treat as clinically indicated.    Reason:  S/p right total hip arthroplasty                  Further instructions from your care team       Monitor blood glucose level 2 times per day.    Pending Results     No orders found from 6/19/2018 to 6/22/2018.            Statement of Approval     Ordered          06/24/18 1057  I have reviewed and agree with all the recommendations and orders detailed in this document.  EFFECTIVE NOW     Approved and electronically signed by:  Antony Laird MD             Admission Information     Date & Time Provider Department Dept. Phone    6/21/2018 Dain Lawrence MD [facility] [phone]      Your Vitals Were     Blood Pressure Pulse Temperature Respirations Height Weight    132/88 103 97.5  F (36.4  C) (Oral) 18 1.803 m (5' 11\") 136.1 kg (300 lb)    Pulse Oximetry BMI (Body Mass Index)                95% 41.84 kg/m2          MyChart Information     [employer] lets you send messages to your doctor, view your test results, renew your prescriptions, schedule appointments and more. To sign up, go to [email or web address] . Click on \"Log in\" on the left side of the screen, which will take you to the Welcome page. Then click on \"Sign up Now\" on the right side of the page.     You will be asked to enter the access code listed below, as well as some personal information. Please follow the directions to create your " username and password.     Your access code is: [email or web address]  Expires: 2018  9:59 AM     Your access code will  in 90 days. If you need help or a new code, please call your [facility] clinic or [phone].        Care EveryWhere ID     This is your Care EveryWhere ID. This could be used by other organizations to access your [facility] medical records  [id number]        Equal Access to Services     CANDELARIA MIKE : Hadii annabelle ku hadasho Soomaali, waaxda luqadaha, qaybta kaalmada adeegyada, waxay micheletin hayreinan nasreen yeungleticiamadison clancy. So [facility] [phone].    ATENCIÓN: Si aron avila, tiene a tinoco disposición servicios gratuitos de asistencia lingüística. Llame al [phone].    We comply with applicable federal civil rights laws and [place] laws. We do not discriminate on the basis of race, color, national origin, age, disability, sex, sexual orientation, or gender identity.               Review of your medicines      START taking        Dose / Directions    acetaminophen 325 MG tablet   Commonly known as:  TYLENOL        Dose:  650 mg   Take 2 tablets (650 mg) by mouth every 4 hours as needed for other (multimodal surgical pain management along with NSAIDS and opioid medication as indicated based on pain control and physical function.)   Quantity:  100 tablet   Refills:  0       cyclobenzaprine 10 MG tablet   Commonly known as:  FLEXERIL        Dose:  10 mg   Take 1 tablet (10 mg) by mouth 3 times daily as needed for muscle spasms   Quantity:  42 tablet   Refills:  0       hydrOXYzine 25 MG tablet   Commonly known as:  ATARAX        Dose:  25 mg   Take 1 tablet (25 mg) by mouth every 6 hours as needed for itching (Helps with muscle spasms, nausea)   Quantity:  60 tablet   Refills:  0       oxyCODONE IR 5 MG tablet   Commonly known as:  ROXICODONE        Dose:  5-10 mg   Take 1-2 tablets (5-10 mg) by mouth every 3 hours as needed for other (pain control or improvement in physical function. Hold dose for  analgesic side effects.)   Quantity:  60 tablet   Refills:  0       senna-docusate 8.6-50 MG per tablet   Commonly known as:  SENOKOT-S;PERICOLACE        Dose:  1 tablet   Take 1 tablet by mouth daily as needed for constipation   Quantity:  15 tablet   Refills:  0         CONTINUE these medicines which may have CHANGED, or have new prescriptions. If we are uncertain of the size of tablets/capsules you have at home, strength may be listed as something that might have changed.        Dose / Directions    aspirin 325 MG EC tablet   This may have changed:    - medication strength  - how much to take        Dose:  325 mg   Take 1 tablet (325 mg) by mouth daily   Quantity:  40 tablet   Refills:  0       blood glucose monitoring lancets   This may have changed:    - when to take this  - additional instructions   Used for:  Type 2 diabetes mellitus with hyperglycemia, without long-term current use of insulin (H)        Use to test blood sugar 1 times daily.   Quantity:  102 each   Refills:  10       blood glucose monitoring test strip   Commonly known as:  ACCU-CHEK GUIDE   This may have changed:    - when to take this  - additional instructions   Used for:  Type 2 diabetes mellitus with hyperglycemia, without long-term current use of insulin (H)        Use to test blood sugar 1 times daily   Quantity:  50 each   Refills:  10         CONTINUE these medicines which have NOT CHANGED        Dose / Directions    ALEVE PO        Dose:  440 mg   Take 440 mg by mouth 2 times daily as needed for moderate pain   Refills:  0       Blood Glucose Monitor System w/Device Kit        2 times daily   Refills:  0       glimepiride 2 MG tablet   Commonly known as:  AMARYL        Dose:  1 mg   Take 1 mg by mouth every morning (before breakfast)   Refills:  0       LIPITOR PO        Dose:  40 mg   Take 40 mg by mouth daily   Refills:  0       METFORMIN HCL PO        Dose:  1000 mg   Take 1,000 mg by mouth 2 times daily (with meals)   Refills:   0            Where to get your medicines      These medications were sent to [facility] - [place], [place] - [address]  [address] [address]     Phone:  [phone]     aspirin 325 MG EC tablet    senna-docusate 8.6-50 MG per tablet         Some of these will need a paper prescription and others can be bought over the counter. Ask your nurse if you have questions.     Bring a paper prescription for each of these medications     oxyCODONE IR 5 MG tablet       You don't need a prescription for these medications     acetaminophen 325 MG tablet    cyclobenzaprine 10 MG tablet    hydrOXYzine 25 MG tablet                Protect others around you: Learn how to safely use, store and throw away your medicines at www.disposemymeds.org.        Information about OPIOIDS     PRESCRIPTION OPIOIDS: WHAT YOU NEED TO KNOW   We gave you an opioid (narcotic) pain medicine. It is important to manage your pain, but opioids are not always the best choice. You should first try all the other options your care team gave you. Take this medicine for as short a time (and as few doses) as possible.     These medicines have risks:    DO NOT drive when on new or higher doses of pain medicine. These medicines can affect your alertness and reaction times, and you could be arrested for driving under the influence (DUI). If you need to use opioids long-term, talk to your care team about driving.    DO NOT operate heave machinery    DO NOT do any other dangerous activities while taking these medicines.     DO NOT drink any alcohol while taking these medicines.      If the opioid prescribed includes acetaminophen, DO NOT take with any other medicines that contain acetaminophen. Read all labels carefully. Look for the word  acetaminophen  or  Tylenol.  Ask your pharmacist if you have questions or are unsure.    You can get addicted to pain medicines, especially if you have a history of addiction (chemical, alcohol or  substance dependence). Talk to your care team about ways to reduce this risk.    Store your pills in a secure place, locked if possible. We will not replace any lost or stolen medicine. If you don t finish your medicine, please throw away (dispose) as directed by your pharmacist. The [place] Pollution Control Agency has more information about safe disposal: https://www.pca.[facility].[place].us/living-green/managing-unwanted-medications.     All opioids tend to cause constipation. Drink plenty of water and eat foods that have a lot of fiber, such as fruits, vegetables, prune juice, apple juice and high-fiber cereal. Take a laxative (Miralax, milk of magnesia, Colace, Senna) if you don t move your bowels at least every other day.              Medication List: This is a list of all your medications and when to take them. Check marks below indicate your daily home schedule. Keep this list as a reference.      Medications           Morning Afternoon Evening Bedtime As Needed    acetaminophen 325 MG tablet   Commonly known as:  TYLENOL   Take 2 tablets (650 mg) by mouth every 4 hours as needed for other (multimodal surgical pain management along with NSAIDS and opioid medication as indicated based on pain control and physical function.)   Last time this was given:  975 mg on 6/24/2018 11:13 AM                                ALEVE PO   Take 440 mg by mouth 2 times daily as needed for moderate pain                                aspirin 325 MG EC tablet   Take 1 tablet (325 mg) by mouth daily   Last time this was given:  325 mg on 6/24/2018  7:52 AM                                   Blood Glucose Monitor System w/Device Kit   2 times daily                                blood glucose monitoring lancets   Use to test blood sugar 1 times daily.                                blood glucose monitoring test strip   Commonly known as:  ACCU-CHEK GUIDE   Use to test blood sugar 1 times daily                                cyclobenzaprine  10 MG tablet   Commonly known as:  FLEXERIL   Take 1 tablet (10 mg) by mouth 3 times daily as needed for muscle spasms   Last time this was given:  10 mg on 6/24/2018  7:55 AM                                glimepiride 2 MG tablet   Commonly known as:  AMARYL   Take 1 mg by mouth every morning (before breakfast)   Last time this was given:  1 mg on 6/24/2018  7:52 AM                                   hydrOXYzine 25 MG tablet   Commonly known as:  ATARAX   Take 1 tablet (25 mg) by mouth every 6 hours as needed for itching (Helps with muscle spasms, nausea)   Last time this was given:  25 mg on 6/23/2018  8:03 AM                                LIPITOR PO   Take 40 mg by mouth daily   Last time this was given:  40 mg on 6/23/2018  7:50 PM                                   METFORMIN HCL PO   Take 1,000 mg by mouth 2 times daily (with meals)   Last time this was given:  1,000 mg on 6/24/2018  7:52 AM                                      oxyCODONE IR 5 MG tablet   Commonly known as:  ROXICODONE   Take 1-2 tablets (5-10 mg) by mouth every 3 hours as needed for other (pain control or improvement in physical function. Hold dose for analgesic side effects.)   Last time this was given:  10 mg on 6/24/2018 12:01 PM                                senna-docusate 8.6-50 MG per tablet   Commonly known as:  SENOKOT-S;PERICOLACE   Take 1 tablet by mouth daily as needed for constipation   Last time this was given:  1 tablet on 6/24/2018  7:53 AM

## 2018-06-21 NOTE — ANESTHESIA CARE TRANSFER NOTE
Patient: Aldo Swansoncellshahnaz    Procedure(s):  Right Total Hip Arthroplasty - Wound Class: I-Clean    Diagnosis: right hip arthritis  Diagnosis Additional Information: No value filed.    Anesthesia Type:   General, Spinal     Note:  Airway :Face Mask  Patient transferred to:PACU  Comments: Patient's VSS. Spontaneous respirations. Patient awake and oriented. IV patent. Report to SELINA Freed.Handoff Report: Identifed the Patient, Identified the Reponsible Provider, Reviewed the pertinent medical history, Discussed the surgical course, Reviewed Intra-OP anesthesia mangement and issues during anesthesia, Set expectations for post-procedure period and Allowed opportunity for questions and acknowledgement of understanding      Vitals: (Last set prior to Anesthesia Care Transfer)    CRNA VITALS  6/21/2018 1700 - 6/21/2018 1731      6/21/2018             Pulse: 87    SpO2: 95 %                Electronically Signed By: THOMAS London CRNA  June 21, 2018  5:31 PM

## 2018-06-21 NOTE — IP AVS SNAPSHOT
` ` Patient Information     Patient Name Sex     Aldo Castillo ([id number]) Male 1960       Room Bed    2400 2400-      Patient Demographics     Address Phone    [address] [address] [phone] (Home)  NONE (Work)  [phone] (Mobile)      Patient Ethnicity & Race     Ethnic Group Patient Race    American White      Emergency Contact(s)     Name Relation Home Work Mobile    MARCELA GARCIA Daughter [phone]      Documents on File        Status Date Received Description       Documents for the Patient    HIM ADOLPH Authorization  10/11/13     Consent for EHR Access Received 16     Insurance Card Received 18 MEDICARE    External Medication Information Consent Accepted 16     Patient ID       [facility] Specified Other       Consent for Services/Privacy Notice - Hospital/Clinic Received () 16     Privacy Notice - [place] Received 16     HIM ADOLPH Authorization  17     Consent for EHR Access-Received-ESign       Consent for EHR Access-Decline-ESign       Consent for Services/Privacy Notice - Hospital/Clinic Received 17     Care Everywhere Prospective Auth Received 10/26/17     Consent for Services - Hospital and Clinic Received 18     HIE Auth Received 18        Documents for the Encounter    H/P - History and Physical  18 [facility] H+P    CMS IM for Patient Signature Received 18     CMS IM for Patient Signature Received 18       Admission Information     Attending Provider Admitting Provider Admission Type Admission Date/Time    Dain Lawrence MD Peterson, Erik Jordan, MD Elective 18  1226    Discharge Date Hospital Service Auth/Cert Status Service Area     [facility]    Unit Room/Bed Admission Status       [facility] 2400/2400- Admission (Confirmed)       Admission     Complaint    right hip arthritis, S/P total hip arthroplasty       Hospital Account     Name Acct ID Class Status Primary Coverage    Aldo Castillo [id number] Inpatient Open MEDICARE - MEDICARE            Guarantor Account (for Hospital Account #[id number])     Name Relation to Pt Service Area Active? Acct Type    Aldo Castillo Self FCS Yes Personal/Family    Address Phone          [address] [place] [address] [phone](H)              Coverage Information (for Hospital Account #[id number])     F/O Payor/Plan Precert #    MEDICARE/MEDICARE     Subscriber Subscriber #    Aldo Castillo [id number]    Address Phone    [employer]  [address]  [place] [address] [phone]

## 2018-06-21 NOTE — IP AVS SNAPSHOT
[facility]    [address] [address]    Phone:  [phone]    Fax:  [phone]                                       After Visit Summary   6/21/2018    Aldo Castillo    MRN: [id number]           After Visit Summary Signature Page     I have received my discharge instructions, and my questions have been answered. I have discussed any challenges I see with this plan with the nurse or doctor.    ..........................................................................................................................................  Patient/Patient Representative Signature      ..........................................................................................................................................  Patient Representative Print Name and Relationship to Patient    ..................................................               ................................................  Date                                            Time    ..........................................................................................................................................  Reviewed by Signature/Title    ...................................................              ..............................................  Date                                                            Time

## 2018-06-22 ENCOUNTER — APPOINTMENT (OUTPATIENT)
Dept: PHYSICAL THERAPY | Facility: CLINIC | Age: 58
DRG: 470 | End: 2018-06-22
Attending: ORTHOPAEDIC SURGERY
Payer: MEDICARE

## 2018-06-22 ENCOUNTER — APPOINTMENT (OUTPATIENT)
Dept: OCCUPATIONAL THERAPY | Facility: CLINIC | Age: 58
DRG: 470 | End: 2018-06-22
Attending: ORTHOPAEDIC SURGERY
Payer: MEDICARE

## 2018-06-22 PROBLEM — M54.30 SCIATIC LEG PAIN: Status: ACTIVE | Noted: 2018-06-22

## 2018-06-22 LAB
ANION GAP SERPL CALCULATED.3IONS-SCNC: 9 MMOL/L (ref 3–14)
BUN SERPL-MCNC: 17 MG/DL (ref 7–30)
CALCIUM SERPL-MCNC: 8.9 MG/DL (ref 8.5–10.1)
CHLORIDE SERPL-SCNC: 103 MMOL/L (ref 94–109)
CO2 SERPL-SCNC: 24 MMOL/L (ref 20–32)
CREAT SERPL-MCNC: 0.78 MG/DL (ref 0.66–1.25)
GFR SERPL CREATININE-BSD FRML MDRD: >90 ML/MIN/1.7M2
GLUCOSE BLDC GLUCOMTR-MCNC: 135 MG/DL (ref 70–99)
GLUCOSE BLDC GLUCOMTR-MCNC: 144 MG/DL (ref 70–99)
GLUCOSE BLDC GLUCOMTR-MCNC: 169 MG/DL (ref 70–99)
GLUCOSE BLDC GLUCOMTR-MCNC: 170 MG/DL (ref 70–99)
GLUCOSE BLDC GLUCOMTR-MCNC: 171 MG/DL (ref 70–99)
GLUCOSE BLDC GLUCOMTR-MCNC: 191 MG/DL (ref 70–99)
GLUCOSE SERPL-MCNC: 170 MG/DL (ref 70–99)
HGB BLD-MCNC: 15.5 G/DL (ref 13.3–17.7)
POTASSIUM SERPL-SCNC: 4.4 MMOL/L (ref 3.4–5.3)
SODIUM SERPL-SCNC: 136 MMOL/L (ref 133–144)

## 2018-06-22 PROCEDURE — 80048 BASIC METABOLIC PNL TOTAL CA: CPT | Performed by: ORTHOPAEDIC SURGERY

## 2018-06-22 PROCEDURE — 00000146 ZZHCL STATISTIC GLUCOSE BY METER IP

## 2018-06-22 PROCEDURE — 99232 SBSQ HOSP IP/OBS MODERATE 35: CPT | Performed by: PHYSICIAN ASSISTANT

## 2018-06-22 PROCEDURE — 97110 THERAPEUTIC EXERCISES: CPT | Mod: GP | Performed by: PHYSICAL THERAPIST

## 2018-06-22 PROCEDURE — 25000132 ZZH RX MED GY IP 250 OP 250 PS 637: Mod: GY | Performed by: PHYSICIAN ASSISTANT

## 2018-06-22 PROCEDURE — 97165 OT EVAL LOW COMPLEX 30 MIN: CPT | Mod: GO

## 2018-06-22 PROCEDURE — 25000128 H RX IP 250 OP 636: Performed by: ORTHOPAEDIC SURGERY

## 2018-06-22 PROCEDURE — A9270 NON-COVERED ITEM OR SERVICE: HCPCS | Mod: GY | Performed by: PHYSICIAN ASSISTANT

## 2018-06-22 PROCEDURE — 36415 COLL VENOUS BLD VENIPUNCTURE: CPT | Performed by: ORTHOPAEDIC SURGERY

## 2018-06-22 PROCEDURE — 40000133 ZZH STATISTIC OT WARD VISIT

## 2018-06-22 PROCEDURE — 97116 GAIT TRAINING THERAPY: CPT | Mod: GP | Performed by: PHYSICAL THERAPIST

## 2018-06-22 PROCEDURE — 97161 PT EVAL LOW COMPLEX 20 MIN: CPT | Mod: GP | Performed by: PHYSICAL THERAPIST

## 2018-06-22 PROCEDURE — 97535 SELF CARE MNGMENT TRAINING: CPT | Mod: GO

## 2018-06-22 PROCEDURE — 85018 HEMOGLOBIN: CPT | Performed by: ORTHOPAEDIC SURGERY

## 2018-06-22 PROCEDURE — 40000193 ZZH STATISTIC PT WARD VISIT: Performed by: PHYSICAL THERAPIST

## 2018-06-22 PROCEDURE — 25000131 ZZH RX MED GY IP 250 OP 636 PS 637: Mod: GY | Performed by: PHYSICIAN ASSISTANT

## 2018-06-22 PROCEDURE — A9270 NON-COVERED ITEM OR SERVICE: HCPCS | Mod: GY | Performed by: ORTHOPAEDIC SURGERY

## 2018-06-22 PROCEDURE — 12000000 ZZH R&B MED SURG/OB

## 2018-06-22 PROCEDURE — 25000132 ZZH RX MED GY IP 250 OP 250 PS 637: Mod: GY | Performed by: ORTHOPAEDIC SURGERY

## 2018-06-22 RX ORDER — HYDRALAZINE HYDROCHLORIDE 20 MG/ML
10 INJECTION INTRAMUSCULAR; INTRAVENOUS EVERY 6 HOURS PRN
Status: DISCONTINUED | OUTPATIENT
Start: 2018-06-22 | End: 2018-06-24 | Stop reason: HOSPADM

## 2018-06-22 RX ADMIN — SENNOSIDES AND DOCUSATE SODIUM 2 TABLET: 8.6; 5 TABLET ORAL at 19:49

## 2018-06-22 RX ADMIN — SODIUM CHLORIDE, POTASSIUM CHLORIDE, SODIUM LACTATE AND CALCIUM CHLORIDE: 600; 310; 30; 20 INJECTION, SOLUTION INTRAVENOUS at 06:36

## 2018-06-22 RX ADMIN — CYCLOBENZAPRINE HYDROCHLORIDE 10 MG: 10 TABLET, FILM COATED ORAL at 15:04

## 2018-06-22 RX ADMIN — OXYCODONE HYDROCHLORIDE 10 MG: 5 TABLET ORAL at 23:05

## 2018-06-22 RX ADMIN — HYDROXYZINE HYDROCHLORIDE 25 MG: 25 TABLET ORAL at 13:32

## 2018-06-22 RX ADMIN — OXYCODONE HYDROCHLORIDE 10 MG: 5 TABLET ORAL at 13:32

## 2018-06-22 RX ADMIN — OXYCODONE HYDROCHLORIDE 10 MG: 5 TABLET ORAL at 06:45

## 2018-06-22 RX ADMIN — METFORMIN HYDROCHLORIDE 1000 MG: 500 TABLET ORAL at 08:18

## 2018-06-22 RX ADMIN — ATORVASTATIN CALCIUM 40 MG: 20 TABLET, FILM COATED ORAL at 19:48

## 2018-06-22 RX ADMIN — OXYCODONE HYDROCHLORIDE 10 MG: 5 TABLET ORAL at 16:41

## 2018-06-22 RX ADMIN — HYDROXYZINE HYDROCHLORIDE 25 MG: 25 TABLET ORAL at 19:17

## 2018-06-22 RX ADMIN — ACETAMINOPHEN 975 MG: 325 TABLET, FILM COATED ORAL at 10:57

## 2018-06-22 RX ADMIN — GLIMEPIRIDE 1 MG: 1 TABLET ORAL at 08:23

## 2018-06-22 RX ADMIN — KETOROLAC TROMETHAMINE 15 MG: 15 INJECTION, SOLUTION INTRAMUSCULAR; INTRAVENOUS at 19:19

## 2018-06-22 RX ADMIN — OXYCODONE HYDROCHLORIDE 10 MG: 5 TABLET ORAL at 19:48

## 2018-06-22 RX ADMIN — SENNOSIDES AND DOCUSATE SODIUM 2 TABLET: 8.6; 5 TABLET ORAL at 08:18

## 2018-06-22 RX ADMIN — CEFAZOLIN SODIUM 2 G: 2 INJECTION, SOLUTION INTRAVENOUS at 06:55

## 2018-06-22 RX ADMIN — KETOROLAC TROMETHAMINE 15 MG: 15 INJECTION, SOLUTION INTRAMUSCULAR; INTRAVENOUS at 13:31

## 2018-06-22 RX ADMIN — OXYCODONE HYDROCHLORIDE 10 MG: 5 TABLET ORAL at 01:36

## 2018-06-22 RX ADMIN — INSULIN ASPART 1 UNITS: 100 INJECTION, SOLUTION INTRAVENOUS; SUBCUTANEOUS at 08:17

## 2018-06-22 RX ADMIN — METFORMIN HYDROCHLORIDE 1000 MG: 500 TABLET ORAL at 17:24

## 2018-06-22 RX ADMIN — HYDROXYZINE HYDROCHLORIDE 25 MG: 25 TABLET ORAL at 03:20

## 2018-06-22 RX ADMIN — ASPIRIN 325 MG: 325 TABLET, DELAYED RELEASE ORAL at 08:18

## 2018-06-22 RX ADMIN — KETOROLAC TROMETHAMINE 15 MG: 15 INJECTION, SOLUTION INTRAMUSCULAR; INTRAVENOUS at 06:45

## 2018-06-22 RX ADMIN — ACETAMINOPHEN 975 MG: 325 TABLET, FILM COATED ORAL at 03:20

## 2018-06-22 RX ADMIN — ACETAMINOPHEN 975 MG: 325 TABLET, FILM COATED ORAL at 19:17

## 2018-06-22 RX ADMIN — INSULIN ASPART 1 UNITS: 100 INJECTION, SOLUTION INTRAVENOUS; SUBCUTANEOUS at 12:05

## 2018-06-22 ASSESSMENT — ACTIVITIES OF DAILY LIVING (ADL): PREVIOUS_RESPONSIBILITIES: MEAL PREP;HOUSEKEEPING;LAUNDRY;SHOPPING

## 2018-06-22 NOTE — PROGRESS NOTES
"[facility] Progress Note      Post-operative Day: 1 Day Post-Op    Procedure(s):  Right Total Hip Arthroplasty - Wound Class: I-Clean      Subjective:    Pain: minimal  aching to R hip. Denies shooting pain, parasthesias, numbness and weakness.   denies Chest pain, SOB, O2 required: None  denies nausea/ emesis  reports lightheadedness, dizziness during PT this morning. States he is feeling better since sitting.   BM -, passing gas +. Urinating well, V[place]he[place] in place: no.       Objective:  Blood pressure 160/81, pulse 84, temperature 97.6  F (36.4  C), temperature source Oral, resp. rate 20, height 1.803 m (5' 11\"), weight 136.1 kg (300 lb), SpO2 93 %.    Patient Vitals for the past 24 hrs:   BP Temp Temp src Pulse Heart Rate Resp SpO2 Height Weight   06/22/18 1110 160/81 97.6  F (36.4  C) Oral 84 - 20 93 % - -   06/22/18 0801 163/83 97.6  F (36.4  C) Oral 76 - 20 96 % - -   06/22/18 0354 159/79 97.8  F (36.6  C) Oral 85 - 20 94 % - -   06/22/18 0146 - - - - - - 97 % - -   06/22/18 0006 167/73 98.3  F (36.8  C) Oral 84 - 18 98 % - -   06/21/18 2003 140/87 - - - 83 14 96 % - -   06/21/18 1945 165/83 - - - 88 - - - -   06/21/18 1930 169/71 - - - 87 - 96 % - -   06/21/18 1915 134/67 - - - 83 - 96 % - -   06/21/18 1900 127/60 - - - 84 16 96 % 1.803 m (5' 11\") -   06/21/18 1830 117/64 - - - 81 (!) 7 94 % - -   06/21/18 1815 115/75 - - - 86 10 94 % - -   06/21/18 1801 - - - - 87 17 94 % - -   06/21/18 1800 105/54 - - - 88 (!) 7 95 % - -   06/21/18 1745 110/66 98  F (36.7  C) Oral - 92 13 97 % - -   06/21/18 1736 - - - - 93 24 98 % - -   06/21/18 1734 112/62 - - - - - - - -   06/21/18 1230 158/78 98  F (36.7  C) Oral 103 - 20 97 % 1.803 m (5' 11\") 136.1 kg (300 lb)   06/21/18 1218 - - - - - - - 1.822 m (5' 11.73\") 136.2 kg (300 lb 4.3 oz)       Wt Readings from Last 4 Encounters:   06/21/18 136.1 kg (300 lb)   11/08/17 (!) 136.2 kg (300 lb 4.8 oz)   10/26/17 134.9 kg (297 lb 8 oz)   09/28/17 136 kg (299 lb 14.4 " oz)     General: Alert and orientated. No apparent distress. Non-labored breathing. Appropriate affect.   MSK: R hip: dressing Clean, dry, and intact. Skin intact, no ecchymosis, no erythema. nontender to palpation to incision site. ROM appropriate for post op. Distal neurovascularly intact. Compartments soft and non-tender. No calf pain. Homans negative. PF/DF and EHL intact.       Pertinent Labs   Lab Results: personally reviewed.     Recent Labs   Lab Test  06/22/18   0654   HGB  15.5   NA  136         Procedure(s):  Right Total Hip Arthroplasty - Wound Class: I-Clean  Plan: Anticoagulation protocol:  mg daily  x 42  days            Pain medications:  oxycodone and tylenol            Weight bearing status:  WBAT            Dressing Change: Aquacel dressing to be left intact until follow up.            Disposition:  TCU on sunday             Follow up: 2 week with LAURO            Continue cares and rehabilitation     Report completed by:  Gretchen Lozano PA-C  Date: 6/22/2018  Time: 11:56 AM

## 2018-06-22 NOTE — PROGRESS NOTES
" 06/22/18 1500   Quick Adds   Type of Visit Initial Occupational Therapy Evaluation   Living Environment   Lives With alone   Living Arrangements house   Home Accessibility stairs to enter home;stairs within home;tub/shower is not walk in   Number of Stairs to Enter Home 3   Number of Stairs Within Home 15   Stair Railings at Home outside, present at both sides;inside, present on right side   Transportation Available family or friend will provide   Functional Level Prior   Ambulation 1-->assistive equipment  (axillary crutches)   Transferring 0-->independent   Toileting 0-->independent   Bathing 0-->independent   Dressing 0-->independent   Eating 0-->independent   Communication 0-->understands/communicates without difficulty   Swallowing 0-->swallows foods/liquids without difficulty   Cognition 0 - no cognition issues reported  (\"my short term memory is not great\")   Fall history within last six months no   Which of the above functional risks had a recent onset or change? ambulation;transferring;toileting;bathing;dressing   General Information   Onset of Illness/Injury or Date of Surgery - Date 06/21/18   Referring Physician Dain Lawrence MD   Patient/Family Goals Statement To go to TCU upon discharge.    Additional Occupational Profile Info/Pertinent History of Current Problem s/p R MARISOL   Precautions/Limitations right hip precautions   Weight-Bearing Status - RLE weight-bearing as tolerated   Cognitive Status Examination   Orientation orientation to person, place and time   Level of Consciousness alert   Pain Assessment   Patient Currently in Pain Yes, see Vital Sign flowsheet   Transfer Skill: Sit to Stand   Level of [place]: Sit/Stand contact guard   Physical Assist/Nonphysical Assist: Sit/Stand 1 person assist   Transfer Skill: Sit to Stand weight-bearing as tolerated   Assistive Device for Transfer: Sit/Stand rolling walker   Transfer Skill: Toilet Transfer   Level of [place]: Toilet contact " "guard   Physical Assist/Nonphysical Assist: Toilet 1 person assist   Weight-Bearing Restrictions: Toilet weight-bearing as tolerated   Assistive Device rolling walker   Upper Body Dressing   Level of Smith: Dress Upper Body independent   Lower Body Dressing   Level of Smith: Dress Lower Body stand-by assist   Physical Assist/Nonphysical Assist: Dress Lower Body 1 person assist   Assistive Device reacher;sock-aid   Instrumental Activities of Daily Living (IADL)   Previous Responsibilities meal prep;housekeeping;laundry;shopping   Activities of Daily Living Analysis   Impairments Contributing to Impaired Activities of Daily Living pain;post surgical precautions   General Therapy Interventions   Planned Therapy Interventions ADL retraining   Clinical Impression   Criteria for Skilled Therapeutic Interventions Met yes, treatment indicated   OT Diagnosis decreased independence with ADLs and functional mobility   Influenced by the following impairments hip precautions, pain, impaired balance.    Assessment of Occupational Performance 5 or more Performance Deficits   Identified Performance Deficits LB dressing, toileting, showering, functional mobility, home management tasks.    Clinical Decision Making (Complexity) Low complexity   Therapy Frequency daily   Predicted Duration of Therapy Intervention (days/wks) 2-3 days   Anticipated Equipment Needs at Discharge (TBD at TCU)   Anticipated Discharge Disposition Transitional Care Facility   Risks and Benefits of Treatment have been explained. Yes   Patient, Family & other staff in agreement with plan of care Yes   [facility] AM-PAC  \"6 Clicks\" Daily Activity Inpatient Short Form   1. Putting on and taking off regular lower body clothing? 3 - A Little   2. Bathing (including washing, rinsing, drying)? 3 - A Little   3. Toileting, which includes using toilet, bedpan or urinal? 3 - A Little   4. Putting on and taking off regular upper body clothing? 4 - " None   5. Taking care of personal grooming such as brushing teeth? 4 - None   6. Eating meals? 4 - None   Daily Activity Raw Score (Score out of 24.Lower scores equate to lower levels of function) 21   Total Evaluation Time   Total Evaluation Time (Minutes) 8

## 2018-06-22 NOTE — PLAN OF CARE
Problem: Patient Care Overview  Goal: Plan of Care/Patient Progress Review  Discharge Planner PT   Patient plan for discharge: TCU     Current status: Eval. completed. Pt instructed on MARISOL Precautions w./  mobility ; require minimal assistance w/ bed mobility; amb. Short in room distances w/ RW, SBA     Barriers to return to prior living situation: Pt lives maura; has multiple steps to amb     Recommendations for discharge: TCU     Rationale for recommendations: see above       Entered by: Yoli Gamino 06/22/2018 12:35 PM

## 2018-06-22 NOTE — PLAN OF CARE
Problem: Hip Arthroplasty (Total, Partial) (Adult)  Goal: Signs and Symptoms of Listed Potential Problems Will be Absent, Minimized or Managed (Hip Arthroplasty)  Signs and symptoms of listed potential problems will be absent, minimized or managed by discharge/transition of care (reference Hip Arthroplasty (Total, Partial) (Adult) CPG).   Outcome: Improving  Tolerated sitting at edge of bed and standing with walker and 2 assist. Unable to void, bladder scanned for 524cc, straight cathed for 600cc. Medicated with Oxycodone 5mg PO. Patient states that he has had a right sciatic nerve problem causing pain from back down to knee.

## 2018-06-22 NOTE — CONSULTS
Care Transition Initial Assessment - RN  Reason For Consult: discharge planning   Met with: Patient.    DATA   Active Problems:    Type 2 diabetes mellitus with hyperglycemia, without long-term current use of insulin (H)    Dyslipidemia    S/P total hip arthroplasty          Primary Care MD Name: Jesi Bauer  Contact information and PCP information verified: Yes    ASSESSMENT  Cognitive Status: awake, alert and oriented.       Resources List: Skilled Nursing Facility     Lives With: alone  Living Arrangements: house     Description of Support System: Supportive   Who is your support system?: Children   Support Assessment: Adequate family and caregiver support   Insurance Concerns: No Insurance issues identified      This writer met with pt, introduced self and role.  Discussed discharge planning and Medicare guidelines in regards to home care, TCU and LTC.  The patient had a MARISOL and is unable to care for self post op.  Discussed TCU, patient was provided with Medicare certified nursing home list. Pts choices are as follows [facility] (Phone: [phone] Fax: [phone]), [facility] Phone: ([phone]) Fax: ([phone]) and [facility] (Main: [phone] Admissions: [phone] Fax: [phone]).  The patient has been accepted at [facility] 6/24/18.  Transportation will be provided by [facility].    PLAN    [facility]      Discharge Planner   Discharge Plans in progress: TCU  Barriers to discharge plan: Medical stability  Follow up plan: Follow up with orthopedics       Entered by: Marlin Garcia 06/22/2018 1:27 PM           Malrin Garcia RN, Care Coordinator [phone]

## 2018-06-22 NOTE — PROGRESS NOTES
Your information has been submitted on June 22nd, 2018 at 03:16:27 PM CDT. The confirmation number is [id number]

## 2018-06-22 NOTE — PROGRESS NOTES
"[place] [facility] ADMISSION NOTE    Patient admitted to room 2400 at approximately 1900 via cart from surgery. Patient was accompanied by transport tech.     Verbal SBAR report received from Lourdes MARKS prior to patient arrival.     Patient trasferred to bed via air tabatha. Patient alert and oriented X 3. The patient is not having any pain.  . Admission vital signs: Blood pressure 140/87, pulse 103, temperature 98  F (36.7  C), temperature source Oral, resp. rate 14, height 1.803 m (5' 11\"), weight 136.1 kg (300 lb), SpO2 96 %. Patient was oriented to plan of care, call light, bed controls, tv, telephone, bathroom and visiting hours.     Risk Assessment    The following safety risks were identified during admission: fall. Yellow risk band applied: YES.     Skin Initial Assessment    This writer admitted this patient and completed a full skin assessment and Panda score in the Adult PCS flowsheet. Appropriate interventions initiated as needed.     Secondary skin check completed by Romeo MARKS.    Skin  Inspection of bony prominences: Full  Procedural focused assessment (identify areas inspected) :  (Right hip, leg, and foot)  Inspection under devices: Full  Skin WDL: WDL    Panda Risk Assessment  Sensory Perception: 4-->no impairment  Moisture: 4-->rarely moist  Activity: 3-->walks occasionally  Mobility: 3-->slightly limited  Nutrition: 3-->adequate  Friction and Shear: 3-->no apparent problem  Panda Score: 20  Bed Support Surface: Atmos Air mattress  Panda Intervention(s) Implemented: HOB elevated 30 degrees or less, draw sheets, encouraged fluids, heels suspended, patient education on pressure relief reinforced, repositioned/turned q2hr    Phyllis Petersen    "

## 2018-06-22 NOTE — PLAN OF CARE
Problem: Hip Arthroplasty (Total, Partial) (Adult)  Goal: Signs and Symptoms of Listed Potential Problems Will be Absent, Minimized or Managed (Hip Arthroplasty)  Signs and symptoms of listed potential problems will be absent, minimized or managed by discharge/transition of care (reference Hip Arthroplasty (Total, Partial) (Adult) CPG).   Outcome: Improving  Patient up with assist of one and walker. Sat up in chair for a good portion of night. Sciatic pain > hip pain. Was able to get comfortable enough to sleep in chair for a while. 10 mg Oxycodone and Atarax improved pain. Incision clean, dry and intact, ice applied. + 2 pulses. Patient still has no urge to void, bladder scanned at 0500 for 75.  Will continue IV fluids. Using call light appropriately.        [id number] Comprehensive

## 2018-06-22 NOTE — PLAN OF CARE
Problem: Patient Care Overview  Goal: Plan of Care/Patient Progress Review  Outcome: Improving  Oxycodone and Tylenol effective for right hip discomfort.  Up with one assist and walker, steady gait.  Patient did void 450 cc maurice colored urine, oral intake is good, will monitor.

## 2018-06-22 NOTE — PROCEDURES
06/21/18     PREOP DIAGNOSIS: Right hip primary degenerative joint disease  POSTOP DIAGNOSIS: Right hip primary degenerative joint disease  PROCEDURE: Right total hip arthroplasty  SURGEON: Dain Lawrence   ASSISTANT: Denae Bernstein.  The presence of a PA was necessary for positioning, retraction, and safe progression of the case  ANESTHESIA: Spinal with sedation   EBL: 200  COMPLICATIONS: None apparent   DISPO: Stable to PACU      IMPLANTS: DePuy Corailsize 12 standard offset collared femur, 36mm x +1.5mm ceramic femoral head, 36mm x 58mm neutral polyethylene liner, and 58 mm Paton Cup with 6.5mm acetabular screws x 2     INDICATIONS: Brett is a 58 year old male with a history of progressive right hip pain due to end stage arthritis.  After failing nonoperative management, he elected to proceed with a right MARISOL, understanding the risks of infection, damage to vessels or nerves, blood clots, instability, leg length discrepancy, ongoing pain and need for revision surgery.      PROCEDURE: Patient was met in the preoperative holding area where consent was reviewed and the right hip was marked.  He was then transferred to the operating theater. After a spinal anesthetic was placed, he was placed in a left lateral decubitus position with his right side up. All bony prominences were padded, he was secured with a Stulberg hip positioner, and an axillary roll was placed.  A timeout was performed verifying the correct patient, surgery, and location. He received preoperative antibiotics as well as transexamic acid. The right lower extremity was then prepped and draped in a standard fashion.      We then made an incision in line with a posterior approach to the hip. Dissection was sharply carried down through skin and subcutaneous tissue, and the gluteus fascia incised in line with the incision. After palpating and protecting the sciatic nerve, an east west retractor was placed. We then released the piriformis and short  external rotators and then performed a T Capsulotomy. The hip was dislocated and a neck cut made, approximately 4 mm proximal to the lesser trochanter. The femoral head measured 53mm and showed end stage arthritic change.     We turned our attention to the acetabulum. After placing anterior and posterior retractors, foveal and labral tissue were removed.  We started with a 52mm reamer and sequentially reamed to a 57, in approximately 45 deg of abduction and 30 deg of anteversion. At that point, we had good bleeding bone circumferentially.  A trial cup was placed with good pressfit followed by our final 58mm cup, again with good press fit.  Given his size, fixation was supplemented with two 6.5mm screws both with good purchase.  We then placed a neutral liner. We then turned our attention to the femur. After using a cookie cutter and canal finder, we broached to a size 12, keeping the stem in approximatley 10-15 deg of anteversion. At that point, we had excellent rotational stability.  We then placed a standard offset neck with and a variety of different length femoral heads.  We felt the +1.5mm head most appropriate which showed leg lengths felt appropriate, the hip was stable in full extension and maximal external rotation, in the sleeping position, and with flexion to 90 degrees and internal rotation to 70 degrees.      We then removed all trial components and thoroughly irrigated the wound. We placed our final size 12stem.  We then retrialed with a +1.5mm head.  Findings as above with flexion to 90 and IR to 70.  We impacted our final 1.5 mm head. The wound was instilled with a dilute betadine solution. Capsule and short external rotators were repaired with 0-ethibond, gluteus fascia with #1 stratafix, subdermal sutures with 2-0 vicryl, and a running 3-0 subcuticular monocryl. A sterile dressing followed by an abductor pillow was placed and the patient was transferred to the PACU in stable condition.          POSTOPERATIVE PLAN:   1. WBAT right LE with posterior hip precautions   2. PT for mobilization   3. DVT prophylaxis: ASA 325mg daily x 6 weeks  4. Perioperative antibiotics      Dain Lawrence MD

## 2018-06-22 NOTE — ANESTHESIA POSTPROCEDURE EVALUATION
Patient: Aldo Castillo    Procedure(s):  Right Total Hip Arthroplasty - Wound Class: I-Clean    Diagnosis:right hip arthritis  Diagnosis Additional Information: No value filed.    Anesthesia Type:  General, Spinal    Note:  Anesthesia Post Evaluation    Patient location during evaluation: Bedside  Patient participation: Able to fully participate in evaluation  Level of consciousness: awake and alert  Pain management: adequate  Airway patency: patent  Cardiovascular status: acceptable  Respiratory status: acceptable  Hydration status: acceptable  PONV: none     Anesthetic complications: None          Last vitals:  Vitals:    06/21/18 1801 06/21/18 1815 06/21/18 1830   BP:  115/75 117/64   Pulse:      Resp: 17 10 (!) 7   Temp:      SpO2: 94% 94% 94%         Electronically Signed By: THOMAS London CRNA  June 21, 2018  7:04 PM

## 2018-06-22 NOTE — PROGRESS NOTES
[facility] Medicine Progress Note  Date of Service: 06/22/2018    Assessment & Plan   Aldo Castillo is a 58 year old male who presented on 6/21/2018 for scheduled Procedure(s):  ARTHROPLASTY HIP by Dain Lawrence MD and is being followed by the hospital medicine service for co-management of acute and/or chronic perioperative medical problems.    S/p Procedure(s):  ARTHROPLASTY HIP  S/P total hip arthroplasty, right  1 Day Post-Op    - pain control, wound cares, physical therapy, occupational therapy and DVT prophylaxis per orthopedic surgery service      Type 2 diabetes mellitus with hyperglycemia, without long-term current use of insulin (H)  Most recent HgbA1c was 6.9 on pre-op exam. Glucose between 170-190 postoperatively. Taking glimepiride and metformin prior to admission.  - Continue prior to admission glimepiride and metformin  - Cover with additional high dose sliding scale insulin  - Hyper/hypoglycemia protocol      HTN (hypertension)  No known previous diagnosis of essential hypertension. Patient reports pressure is usually around 115/80. Post-operatively, SBP running between 130- 170. Possibly related to stress and post-op pain.  - Prn hydralazine available for SBP > 165      Morbid obesity, unspecified obesity type (H)  BMP 41.84.      Dyslipidemia  Taking Lipitor prior to admission, continue    Sciatic leg pain  Chronic secondary to a disc problem at L5. Patient continues to have preexisting episodes of sciatic pain, which he stats is more painful than the hip pain. Defer to outpatient management.        DVT Prophylaxis: as per orthopedic surgery service - Pneumatic Compression Devices  Code Status: Full Code    Lines: Peripheral   Varghese catheter: Not indicated    Discussion: Medically, the patient appears stable.    Disposition: Anticipate discharge 1-2 days to TCU     Attestation:  I have reviewed today's vital signs, notes, medications, labs and  "imaging.    Deisi Pickett PA-C  [facility]  Pager: [phone]       Interval History   Patient feeling \"OK\" after surgery. Hip is stiff and sore, pain rated 5/10. He has preexisting sciatic pain from a vertebral problem at L5, continues to have sciatic pain that \"is more painful than my hip pain.\" Did well with therapy, although developed some acute dizziness after walking for a while, needed to sit down. No falls or syncopal episode. Dizziness has not reoccurred.    Tolerating oral intake, no nausea or vomiting. No bowel movement, but is passing flatus. Urinating normally.    Plan is to discharge to TCU on 6/24. Patient lives alone in [place], [place].    Denies chest pain, palpitations, cough, wheeze, shortness of breath, abdominal pain, numbness, or tingling.      Physical Exam   Temp:  [96.4  F (35.8  C)-98.3  F (36.8  C)] 96.4  F (35.8  C)  Pulse:  [76-87] 87  Heart Rate:  [81-93] 83  Resp:  [7-24] 18  BP: (105-169)/(54-87) 142/82  SpO2:  [93 %-98 %] 95 %    Weights:   Vitals:    06/21/18 1218 06/21/18 1230   Weight: 136.2 kg (300 lb 4.3 oz) 136.1 kg (300 lb)    Body mass index is 41.84 kg/(m^2).    General appearance: Awake, alert, and in no apparent distress. Pleasant and conversational, speaking in full sentences.  HEENT: Moist mucus membranes, no exudate. No scleral icterus.  CV: Regular rhythm & rate, no murmurs. No edema. Peripheral pulses intact.  Respiratory: Moving air well bilaterally, no wheezing, crackles, or rhonchi.  GI: Non-distended, soft, nontender to palpation. No rebound or guarding. Normoactive bowel sounds.  Skin: Warm, dry, no rashes or ecchymoses. No mottling of skin. Incision at right hip with intact bandage, clean and dry. No exudate or erythema.  Musculoskeletal / extremities: Moves all extremities equally, no obvious abnormalities.  Neurologic: No focal deficits.    Data     Recent Labs  Lab 06/22/18  0654   HGB 15.5      POTASSIUM 4.4   CHLORIDE 103 "   CO2 24   BUN 17   CR 0.78   ANIONGAP 9   RADHA 8.9   *         Recent Labs  Lab 06/22/18  1153 06/22/18  1036 06/22/18  0654 06/22/18  0636 06/22/18  0139 06/21/18  2140 06/21/18  1744   GLC  --   --  170*  --   --   --   --    * 170*  --  169* 191* 178* 130*        Unresulted Labs Ordered in the Past 30 Days of this Admission     No orders found for last 61 day(s).           Imaging  Recent Results (from the past 24 hour(s))   XR Pelvis w Hip Port Right 1 View    Narrative    XR PELVIS AD HIP PORTABLE RIGHT 1 VIEW   6/21/2018 6:21 PM     HISTORY: Post Op Hip Arthroplasty;     COMPARISON: None.      Impression    IMPRESSION: A right hip arthroplasty has been performed. The  components appear to be in proper position. No postoperative  complications are identified.    WILBERT CORMIER MD        I reviewed all new labs and imaging results over the last 24 hours. I personally reviewed no images or EKG's today.    Medications       acetaminophen  975 mg Oral Q8H     aspirin  325 mg Oral Daily     atorvastatin (LIPITOR) tablet 40 mg  40 mg Oral Daily at 8 pm     glimepiride  1 mg Oral QAM AC     insulin aspart  1-10 Units Subcutaneous TID AC     insulin aspart  1-7 Units Subcutaneous At Bedtime     metFORMIN (GLUCOPHAGE) tablet 1,000 mg  1,000 mg Oral BID w/meals     senna-docusate  1 tablet Oral BID    Or     senna-docusate  2 tablet Oral BID     sodium chloride (PF)  3 mL Intracatheter Q8H       Deisi Pickett PA-C  [facility] Hospitalist Service  Pager: [phone]

## 2018-06-22 NOTE — PLAN OF CARE
Problem: Patient Care Overview  Goal: Plan of Care/Patient Progress Review  Discharge Planner OT   Patient plan for discharge: TCU    Current status: Participated in AE training for lower body dressing. Able to don/doff socks and pants within hip precautions using reacher and wide-based sock aid. Up with CGA using FWW for toilet transfer using simulated home set-up.     Barriers to return to prior living situation: stairs, lives alone, hip precautions, decreased activity tolerance.     Recommendations for discharge: TCU     Rationale for recommendations: To increase independence with ADLs/IADLs.        Entered by: Patricia Marte 06/22/2018 3:23 PM

## 2018-06-23 ENCOUNTER — APPOINTMENT (OUTPATIENT)
Dept: OCCUPATIONAL THERAPY | Facility: CLINIC | Age: 58
DRG: 470 | End: 2018-06-23
Attending: ORTHOPAEDIC SURGERY
Payer: MEDICARE

## 2018-06-23 ENCOUNTER — APPOINTMENT (OUTPATIENT)
Dept: PHYSICAL THERAPY | Facility: CLINIC | Age: 58
DRG: 470 | End: 2018-06-23
Attending: ORTHOPAEDIC SURGERY
Payer: MEDICARE

## 2018-06-23 LAB
GLUCOSE BLDC GLUCOMTR-MCNC: 134 MG/DL (ref 70–99)
GLUCOSE BLDC GLUCOMTR-MCNC: 159 MG/DL (ref 70–99)
GLUCOSE BLDC GLUCOMTR-MCNC: 162 MG/DL (ref 70–99)
GLUCOSE BLDC GLUCOMTR-MCNC: 164 MG/DL (ref 70–99)
GLUCOSE BLDC GLUCOMTR-MCNC: 182 MG/DL (ref 70–99)
HGB BLD-MCNC: 14.9 G/DL (ref 13.3–17.7)

## 2018-06-23 PROCEDURE — A9270 NON-COVERED ITEM OR SERVICE: HCPCS | Mod: GY | Performed by: PHYSICIAN ASSISTANT

## 2018-06-23 PROCEDURE — 00000146 ZZHCL STATISTIC GLUCOSE BY METER IP

## 2018-06-23 PROCEDURE — A9270 NON-COVERED ITEM OR SERVICE: HCPCS | Mod: GY | Performed by: ORTHOPAEDIC SURGERY

## 2018-06-23 PROCEDURE — 40000193 ZZH STATISTIC PT WARD VISIT

## 2018-06-23 PROCEDURE — 36415 COLL VENOUS BLD VENIPUNCTURE: CPT | Performed by: ORTHOPAEDIC SURGERY

## 2018-06-23 PROCEDURE — 12000000 ZZH R&B MED SURG/OB

## 2018-06-23 PROCEDURE — 85018 HEMOGLOBIN: CPT | Performed by: ORTHOPAEDIC SURGERY

## 2018-06-23 PROCEDURE — 99232 SBSQ HOSP IP/OBS MODERATE 35: CPT | Performed by: FAMILY MEDICINE

## 2018-06-23 PROCEDURE — 97110 THERAPEUTIC EXERCISES: CPT | Mod: GP

## 2018-06-23 PROCEDURE — 97116 GAIT TRAINING THERAPY: CPT | Mod: GP

## 2018-06-23 PROCEDURE — 40000133 ZZH STATISTIC OT WARD VISIT

## 2018-06-23 PROCEDURE — 25000132 ZZH RX MED GY IP 250 OP 250 PS 637: Mod: GY | Performed by: ORTHOPAEDIC SURGERY

## 2018-06-23 PROCEDURE — 25000128 H RX IP 250 OP 636: Performed by: ORTHOPAEDIC SURGERY

## 2018-06-23 PROCEDURE — 25000132 ZZH RX MED GY IP 250 OP 250 PS 637: Mod: GY | Performed by: PHYSICIAN ASSISTANT

## 2018-06-23 PROCEDURE — 97535 SELF CARE MNGMENT TRAINING: CPT | Mod: GO

## 2018-06-23 PROCEDURE — 99207 ZZC CDG-CHARGE REQUIRED MANUAL ENTRY: CPT | Performed by: FAMILY MEDICINE

## 2018-06-23 RX ADMIN — OXYCODONE HYDROCHLORIDE 10 MG: 5 TABLET ORAL at 19:54

## 2018-06-23 RX ADMIN — OXYCODONE HYDROCHLORIDE 10 MG: 5 TABLET ORAL at 08:02

## 2018-06-23 RX ADMIN — OXYCODONE HYDROCHLORIDE 10 MG: 5 TABLET ORAL at 17:08

## 2018-06-23 RX ADMIN — OXYCODONE HYDROCHLORIDE 10 MG: 5 TABLET ORAL at 14:16

## 2018-06-23 RX ADMIN — OXYCODONE HYDROCHLORIDE 10 MG: 5 TABLET ORAL at 23:22

## 2018-06-23 RX ADMIN — GLIMEPIRIDE 1 MG: 1 TABLET ORAL at 08:04

## 2018-06-23 RX ADMIN — SENNOSIDES AND DOCUSATE SODIUM 1 TABLET: 8.6; 5 TABLET ORAL at 19:50

## 2018-06-23 RX ADMIN — ACETAMINOPHEN 975 MG: 325 TABLET, FILM COATED ORAL at 19:50

## 2018-06-23 RX ADMIN — OXYCODONE HYDROCHLORIDE 10 MG: 5 TABLET ORAL at 02:24

## 2018-06-23 RX ADMIN — ATORVASTATIN CALCIUM 40 MG: 20 TABLET, FILM COATED ORAL at 19:50

## 2018-06-23 RX ADMIN — ASPIRIN 325 MG: 325 TABLET, DELAYED RELEASE ORAL at 08:03

## 2018-06-23 RX ADMIN — ACETAMINOPHEN 975 MG: 325 TABLET, FILM COATED ORAL at 02:24

## 2018-06-23 RX ADMIN — HYDROXYZINE HYDROCHLORIDE 25 MG: 25 TABLET ORAL at 01:21

## 2018-06-23 RX ADMIN — SENNOSIDES AND DOCUSATE SODIUM 2 TABLET: 8.6; 5 TABLET ORAL at 08:03

## 2018-06-23 RX ADMIN — HYDROXYZINE HYDROCHLORIDE 25 MG: 25 TABLET ORAL at 08:03

## 2018-06-23 RX ADMIN — METFORMIN HYDROCHLORIDE 1000 MG: 500 TABLET ORAL at 17:05

## 2018-06-23 RX ADMIN — OXYCODONE HYDROCHLORIDE 10 MG: 5 TABLET ORAL at 11:14

## 2018-06-23 RX ADMIN — METFORMIN HYDROCHLORIDE 1000 MG: 500 TABLET ORAL at 08:03

## 2018-06-23 RX ADMIN — KETOROLAC TROMETHAMINE 15 MG: 15 INJECTION, SOLUTION INTRAMUSCULAR; INTRAVENOUS at 01:19

## 2018-06-23 RX ADMIN — ACETAMINOPHEN 975 MG: 325 TABLET, FILM COATED ORAL at 11:14

## 2018-06-23 NOTE — PLAN OF CARE
Problem: Patient Care Overview  Goal: Plan of Care/Patient Progress Review  Outcome: Improving  Patient doing well. Up to shower. Ambulating in hallway with therapy. Up to chair for meals. Patient states he is more comfortable today. Receiving Oxycodone, Tylenol and Atarax with good relief. Less sciatic discomfort. More comfortable up in chair then in bed. Aquacel dressing in place. Ice used off and on. Tolerates regular diet. No problems urinating. No BM. Patient drank prune juice. Passing flatus.

## 2018-06-23 NOTE — PLAN OF CARE
Problem: Hip Arthroplasty (Total, Partial) (Adult)  Goal: Signs and Symptoms of Listed Potential Problems Will be Absent, Minimized or Managed (Hip Arthroplasty)  Signs and symptoms of listed potential problems will be absent, minimized or managed by discharge/transition of care (reference Hip Arthroplasty (Total, Partial) (Adult) CPG).   Outcome: Improving  Admits that hip pain is better controlled than yesterday afternoon.  Taking oxycodone, Atarax, Tylenol and Toradol.  Does complain of sciatic pain and is more comfortable sitting up in bedside chair.  VSS and saturation levels WNL.  Uses call light for needs, up to bathroom with walker, gait belt and assist of 1. Voiding well. Needs assist lifting right leg in and out of bed.  No BM. Passing flatus.

## 2018-06-23 NOTE — PLAN OF CARE
Problem: Patient Care Overview  Goal: Plan of Care/Patient Progress Review  Occupational Therapy Discharge Summary    Reason for therapy discharge:    Discharged to transitional care facility.  All goals and outcomes met, no further needs identified.    Progress towards therapy goal(s). See goals on Care Plan in [facility] health record for goal details.  Goals met     6/23/2018: Pt completed shower with nursing prior to OT session, reports he required set up/supervision. Reviewed PHP, Pt able to ind recall 1/3 precautions. Pt completed all aspects of toileting with supervision and verbal cues for walker safety.   Pt does not require further OT in acute setting. Will benefit from TCU at [place]      Therapy recommendation(s):    Continued therapy is recommended.  Rationale/Recommendations:  Pt will benefit from ongoing OT at TCU to maximize safety and ind with ADLs. .

## 2018-06-23 NOTE — PROGRESS NOTES
"Ortho    No acute events overnight.  Fairly sore yesterday but feeling better today  Getting up with therapy and doing well  Also having quite a bit of \"sciatic pain\" which is gradually improved today    AFVSS  Pleasant, NAD  Nonlabored breathing  Right LE: Dressing cdi.  Fires ehl/fhl/gsc/ta c SILT dp/sp/tib n.  Toes warm    IMPRESSION:   1.  s/p right MARISOL 6.21.18    PLAN:   --WBAT RLE with PT for mobilization.     --DVT prophylaxis: ASA 325mg daily x 6 weeks   --Likely dc tomorrow to TCU    Dain Lawrence MD        "

## 2018-06-23 NOTE — PROGRESS NOTES
[facility] Medicine Progress Note  Date of Service: 06/23/2018    Assessment & Plan   Aldo Castillo is a 58 year old male who presented on 6/21/2018 for scheduled Procedure(s):  ARTHROPLASTY HIP by Dain Lawrence MD and is being followed by the hospital medicine service for co-management of acute and/or chronic perioperative medical problems.    S/p Procedure(s):  ARTHROPLASTY HIP RIGHT   - POD # 2  - pain control, wound cares, physical therapy, occupational therapy and DVT prophylaxis per orthopedic surgery service    Active Problems:    Type 2 diabetes mellitus with hyperglycemia, without long-term current use of insulin (H)    Morbid obesity, unspecified obesity type (H)    Dyslipidemia    S/P total hip arthroplasty, right    Sciatic leg pain    HTN (hypertension)      DVT Prophylaxis: as per orthopedic surgery service - aspirin  Code Status: Full Code    Lines: none   Varghese catheter: none    Discussion: Medically, the patient appears stable, discussed his blood sugars with him. Running a bit high. Last A1c was 6.9 . He is on high dose sliding scale.     Disposition: Anticipate discharge tomorrow to TCU      Attestation:  I have reviewed today's vital signs, notes, medications, labs and imaging.  Amount of time performed on this daily note: 20 minutes.  Antony Laird MD         Interval History   Doing relatively well.     Physical Exam   Temp:  [97.6  F (36.4  C)-97.9  F (36.6  C)] 97.9  F (36.6  C)  Pulse:  [] 103  Heart Rate:  [107] 107  Resp:  [20] 20  BP: (130-147)/(68-77) 133/75  SpO2:  [91 %-98 %] 91 %    Weights:   Vitals:    06/21/18 1218 06/21/18 1230   Weight: 136.2 kg (300 lb 4.3 oz) 136.1 kg (300 lb)    Body mass index is 41.84 kg/(m^2).    General: alert and oriented x4, in no acute distress  CV: Regular rate/rhythm, no appreciable murmur, rub, or gallop  Respiratory: CTA bilaterally, equal chest expansion  GI: Soft, nontender,  normoactive S  Skin: Warm and dry  Musculoskeletal: Negative  homans bilaterally.  No pain  to palpation of posterior calves. No  edema to lower extremities.  Neuro: equal  strength    Data     Recent Labs  Lab 06/23/18  0609 06/22/18  0654   HGB 14.9 15.5   NA  --  136   POTASSIUM  --  4.4   CHLORIDE  --  103   CO2  --  24   BUN  --  17   CR  --  0.78   ANIONGAP  --  9   RADHA  --  8.9   GLC  --  170*         Recent Labs  Lab 06/23/18  1105 06/23/18  0622 06/23/18  0215 06/22/18  2138 06/22/18  1653 06/22/18  1153  06/22/18  0654   GLC  --   --   --   --   --   --   --  170*   * 162* 164* 135* 144* 171*  < >  --    < > = values in this interval not displayed.     Unresulted Labs Ordered in the Past 30 Days of this Admission     No orders found from 4/22/2018 to 6/22/2018.           Imaging  No results found for this or any previous visit (from the past 24 hour(s)).     I reviewed all new labs and imaging results over the last 24 hours. I personally reviewed no images or EKG's today.    Medications       acetaminophen  975 mg Oral Q8H     aspirin  325 mg Oral Daily     atorvastatin (LIPITOR) tablet 40 mg  40 mg Oral Daily at 8 pm     glimepiride  1 mg Oral QAM AC     insulin aspart  1-10 Units Subcutaneous TID AC     insulin aspart  1-7 Units Subcutaneous At Bedtime     metFORMIN (GLUCOPHAGE) tablet 1,000 mg  1,000 mg Oral BID w/meals     senna-docusate  1 tablet Oral BID    Or     senna-docusate  2 tablet Oral BID     sodium chloride (PF)  3 mL Intracatheter Q8H   Antony Laird MD

## 2018-06-24 ENCOUNTER — APPOINTMENT (OUTPATIENT)
Dept: PHYSICAL THERAPY | Facility: CLINIC | Age: 58
DRG: 470 | End: 2018-06-24
Attending: ORTHOPAEDIC SURGERY
Payer: MEDICARE

## 2018-06-24 VITALS
DIASTOLIC BLOOD PRESSURE: 88 MMHG | HEIGHT: 71 IN | SYSTOLIC BLOOD PRESSURE: 132 MMHG | RESPIRATION RATE: 18 BRPM | OXYGEN SATURATION: 95 % | TEMPERATURE: 97.5 F | BODY MASS INDEX: 42 KG/M2 | WEIGHT: 300 LBS | HEART RATE: 103 BPM

## 2018-06-24 LAB
GLUCOSE BLDC GLUCOMTR-MCNC: 159 MG/DL (ref 70–99)
GLUCOSE BLDC GLUCOMTR-MCNC: 163 MG/DL (ref 70–99)
GLUCOSE BLDC GLUCOMTR-MCNC: 175 MG/DL (ref 70–99)

## 2018-06-24 PROCEDURE — 97110 THERAPEUTIC EXERCISES: CPT | Mod: GP

## 2018-06-24 PROCEDURE — 97116 GAIT TRAINING THERAPY: CPT | Mod: GP

## 2018-06-24 PROCEDURE — A9270 NON-COVERED ITEM OR SERVICE: HCPCS | Mod: GY | Performed by: PHYSICIAN ASSISTANT

## 2018-06-24 PROCEDURE — 00000146 ZZHCL STATISTIC GLUCOSE BY METER IP

## 2018-06-24 PROCEDURE — 40000193 ZZH STATISTIC PT WARD VISIT

## 2018-06-24 PROCEDURE — 99232 SBSQ HOSP IP/OBS MODERATE 35: CPT | Performed by: FAMILY MEDICINE

## 2018-06-24 PROCEDURE — A9270 NON-COVERED ITEM OR SERVICE: HCPCS | Mod: GY | Performed by: ORTHOPAEDIC SURGERY

## 2018-06-24 PROCEDURE — 25000132 ZZH RX MED GY IP 250 OP 250 PS 637: Mod: GY | Performed by: ORTHOPAEDIC SURGERY

## 2018-06-24 PROCEDURE — 99207 ZZC CDG-CHARGE REQUIRED MANUAL ENTRY: CPT | Performed by: FAMILY MEDICINE

## 2018-06-24 PROCEDURE — 25000132 ZZH RX MED GY IP 250 OP 250 PS 637: Mod: GY | Performed by: PHYSICIAN ASSISTANT

## 2018-06-24 RX ORDER — HYDROXYZINE HYDROCHLORIDE 25 MG/1
25 TABLET, FILM COATED ORAL EVERY 6 HOURS PRN
Qty: 60 TABLET | Refills: 0 | DISCHARGE
Start: 2018-06-24 | End: 2018-07-09

## 2018-06-24 RX ORDER — ACETAMINOPHEN 325 MG/1
650 TABLET ORAL EVERY 4 HOURS PRN
Qty: 100 TABLET | Refills: 0 | DISCHARGE
Start: 2018-06-24 | End: 2018-06-25

## 2018-06-24 RX ORDER — OXYCODONE HYDROCHLORIDE 5 MG/1
5-10 TABLET ORAL
Qty: 60 TABLET | Refills: 0 | Status: SHIPPED | OUTPATIENT
Start: 2018-06-24 | End: 2018-07-09

## 2018-06-24 RX ORDER — AMOXICILLIN 250 MG
1 CAPSULE ORAL DAILY PRN
Qty: 15 TABLET | Refills: 0 | Status: SHIPPED | OUTPATIENT
Start: 2018-06-24 | End: 2020-03-04

## 2018-06-24 RX ORDER — CYCLOBENZAPRINE HCL 10 MG
10 TABLET ORAL 3 TIMES DAILY PRN
Qty: 42 TABLET | Refills: 0 | DISCHARGE
Start: 2018-06-24 | End: 2018-07-09

## 2018-06-24 RX ADMIN — CYCLOBENZAPRINE HYDROCHLORIDE 10 MG: 10 TABLET, FILM COATED ORAL at 07:55

## 2018-06-24 RX ADMIN — GLIMEPIRIDE 1 MG: 1 TABLET ORAL at 07:52

## 2018-06-24 RX ADMIN — OXYCODONE HYDROCHLORIDE 10 MG: 5 TABLET ORAL at 02:17

## 2018-06-24 RX ADMIN — ACETAMINOPHEN 975 MG: 325 TABLET, FILM COATED ORAL at 11:13

## 2018-06-24 RX ADMIN — OXYCODONE HYDROCHLORIDE 10 MG: 5 TABLET ORAL at 09:28

## 2018-06-24 RX ADMIN — OXYCODONE HYDROCHLORIDE 10 MG: 5 TABLET ORAL at 12:01

## 2018-06-24 RX ADMIN — ACETAMINOPHEN 975 MG: 325 TABLET, FILM COATED ORAL at 02:17

## 2018-06-24 RX ADMIN — ASPIRIN 325 MG: 325 TABLET, DELAYED RELEASE ORAL at 07:52

## 2018-06-24 RX ADMIN — OXYCODONE HYDROCHLORIDE 10 MG: 5 TABLET ORAL at 06:08

## 2018-06-24 RX ADMIN — METFORMIN HYDROCHLORIDE 1000 MG: 500 TABLET ORAL at 07:52

## 2018-06-24 RX ADMIN — SENNOSIDES AND DOCUSATE SODIUM 1 TABLET: 8.6; 5 TABLET ORAL at 07:53

## 2018-06-24 NOTE — PROGRESS NOTES
[facility] Medicine Progress Note  Date of Service: 06/24/2018    Assessment & Plan   Aldo Castillo is a 58 year old male who presented on 6/21/2018 for scheduled Procedure(s):  ARTHROPLASTY HIP by Dain Lawrence MD and is being followed by the hospital medicine service for co-management of acute and/or chronic perioperative medical problems.    S/p Procedure(s):  ARTHROPLASTY HIP  - POD # 3  - pain control, wound cares, physical therapy, occupational therapy and DVT prophylaxis per orthopedic surgery service    Active Problems:    Type 2 diabetes mellitus with hyperglycemia, without long-term current use of insulin (H)    Morbid obesity, unspecified obesity type (H)    Dyslipidemia    S/P total hip arthroplasty, right    Sciatic leg pain    HTN (hypertension)      DVT Prophylaxis: as per orthopedic surgery service - Aspirin   Code Status: Prior    Lines: None  Varghese catheter: None    Discussion: Medically, the patient appears stable     Disposition: Anticipate discharge Today to Nursing home      Attestation:  I have reviewed today's vital signs, notes, medications, labs and imaging.  Amount of time performed on this daily note: 20 minutes.    Antony Laird MD         Interval History   Doing well.     Physical Exam   Temp:  [97.5  F (36.4  C)-98  F (36.7  C)] 97.5  F (36.4  C)  Pulse:  [103] 103  Heart Rate:  [] 80  Resp:  [18-20] 18  BP: (132-142)/(75-88) 132/88  SpO2:  [91 %-98 %] 95 %    Weights:   Vitals:    06/21/18 1218 06/21/18 1230   Weight: 136.2 kg (300 lb 4.3 oz) 136.1 kg (300 lb)    Body mass index is 41.84 kg/(m^2).    General: alert and oriented x4, in no acute distress  CV: Regular rate/rhythm, no appreciable murmur, rub, or gallop  Respiratory: CTA bilaterally, equal chest expansion  GI: Soft, nontender, normoactive S  Skin: Warm and dry  Musculoskeletal: -ve homans bilaterally.  No tenderness  to palpation of posterior calves.  No  edema to lower extremities.  Neuro: equal  strength    Data     Recent Labs  Lab 06/23/18  0609 06/22/18  0654   HGB 14.9 15.5   NA  --  136   POTASSIUM  --  4.4   CHLORIDE  --  103   CO2  --  24   BUN  --  17   CR  --  0.78   ANIONGAP  --  9   RADHA  --  8.9   GLC  --  170*         Recent Labs  Lab 06/24/18  0610 06/24/18  0200 06/23/18  2124 06/23/18  1623 06/23/18  1105 06/23/18  0622  06/22/18  0654   GLC  --   --   --   --   --   --   --  170*   * 175* 159* 134* 182* 162*  < >  --    < > = values in this interval not displayed.     Unresulted Labs Ordered in the Past 30 Days of this Admission     No orders found from 4/22/2018 to 6/22/2018.           Imaging  No results found for this or any previous visit (from the past 24 hour(s)).     I reviewed all new labs and imaging results over the last 24 hours. I personally reviewed no images or EKG's today.    Medications       acetaminophen  975 mg Oral Q8H     aspirin  325 mg Oral Daily     atorvastatin (LIPITOR) tablet 40 mg  40 mg Oral Daily at 8 pm     glimepiride  1 mg Oral QAM AC     insulin aspart  1-10 Units Subcutaneous TID AC     insulin aspart  1-7 Units Subcutaneous At Bedtime     metFORMIN (GLUCOPHAGE) tablet 1,000 mg  1,000 mg Oral BID w/meals     senna-docusate  1 tablet Oral BID    Or     senna-docusate  2 tablet Oral BID     sodium chloride (PF)  3 mL Intracatheter Q8H   Antony Laird MD

## 2018-06-24 NOTE — PLAN OF CARE
Problem: Patient Care Overview  Goal: Plan of Care/Patient Progress Review  Outcome: No Change  Patient complains of mild hip pain and improved sciatic pain. Pain increased in lower back. Ice packs in use. States he is having muscle spasms. Agreed to try dose of Flexeril. Patient most comfortable when up in chair. Patient able to adjust self in chair for comfort. Plan for TCU today.

## 2018-06-24 NOTE — PLAN OF CARE
Problem: Patient Care Overview  Goal: Plan of Care/Patient Progress Review  Outcome: Completed Date Met: 06/24/18  Discharge orders placed. Report called to nurse Cheyanne benites [facility]. Family to transport patient.

## 2018-06-24 NOTE — PHARMACY - DISCHARGE MEDICATION RECONCILIATION
Discharge medication review for this patient is complete. Pharmacist assisted with medication reconciliation of discharge medications with prior to admission medications.     The following changes were made to the discharge medication list based on pharmacist review:  Added:  Oxycodone, acetaminophen, flexeril, senna and hydroxyzine  Discontinued: NA  Changed: increase aspirin to 325 mg daily      Patient's Discharge Medication List  - medications as listed on After Visit Summary (AVS)     Review of your medicines      START taking       Dose / Directions    acetaminophen 325 MG tablet   Commonly known as:  TYLENOL        Dose:  650 mg   Take 2 tablets (650 mg) by mouth every 4 hours as needed for other (multimodal surgical pain management along with NSAIDS and opioid medication as indicated based on pain control and physical function.)   Quantity:  100 tablet   Refills:  0       cyclobenzaprine 10 MG tablet   Commonly known as:  FLEXERIL        Dose:  10 mg   Take 1 tablet (10 mg) by mouth 3 times daily as needed for muscle spasms   Quantity:  42 tablet   Refills:  0       hydrOXYzine 25 MG tablet   Commonly known as:  ATARAX        Dose:  25 mg   Take 1 tablet (25 mg) by mouth every 6 hours as needed for itching (Helps with muscle spasms, nausea)   Quantity:  60 tablet   Refills:  0       oxyCODONE IR 5 MG tablet   Commonly known as:  ROXICODONE        Dose:  5-10 mg   Take 1-2 tablets (5-10 mg) by mouth every 3 hours as needed for other (pain control or improvement in physical function. Hold dose for analgesic side effects.)   Quantity:  60 tablet   Refills:  0       senna-docusate 8.6-50 MG per tablet   Commonly known as:  SENOKOT-S;PERICOLACE        Dose:  1 tablet   Take 1 tablet by mouth daily as needed for constipation   Quantity:  15 tablet   Refills:  0         CONTINUE these medicines which may have CHANGED, or have new prescriptions. If we are uncertain of the size of tablets/capsules you have at home,  strength may be listed as something that might have changed.       Dose / Directions    aspirin 325 MG EC tablet   This may have changed:    - medication strength  - how much to take        Dose:  325 mg   Take 1 tablet (325 mg) by mouth daily   Quantity:  40 tablet   Refills:  0       blood glucose monitoring lancets   This may have changed:    - when to take this  - additional instructions   Used for:  Type 2 diabetes mellitus with hyperglycemia, without long-term current use of insulin (H)        Use to test blood sugar 1 times daily.   Quantity:  102 each   Refills:  10       blood glucose monitoring test strip   Commonly known as:  ACCU-CHEK GUIDE   This may have changed:    - when to take this  - additional instructions   Used for:  Type 2 diabetes mellitus with hyperglycemia, without long-term current use of insulin (H)        Use to test blood sugar 1 times daily   Quantity:  50 each   Refills:  10         CONTINUE these medicines which have NOT CHANGED       Dose / Directions    ALEVE PO        Dose:  440 mg   Take 440 mg by mouth 2 times daily as needed for moderate pain   Refills:  0       Blood Glucose Monitor System w/Device Kit        2 times daily   Refills:  0       glimepiride 2 MG tablet   Commonly known as:  AMARYL        Dose:  1 mg   Take 1 mg by mouth every morning (before breakfast)   Refills:  0       LIPITOR PO        Dose:  40 mg   Take 40 mg by mouth daily   Refills:  0       METFORMIN HCL PO        Dose:  1000 mg   Take 1,000 mg by mouth 2 times daily (with meals)   Refills:  0            Where to get your medicines      These medications [place] [facility] [place] [address]  [address] [facility] [address] [facility] [address]     Phone:  [phone]      aspirin 325 MG EC tablet     senna-docusate 8.6-50 MG per tablet         Some of these will need a paper prescription and others can be bought over the counter. Ask your nurse if you have questions.     Bring a  paper prescription for each of these medications      oxyCODONE IR 5 MG tablet       You don't need a prescription for these medications      acetaminophen 325 MG tablet     cyclobenzaprine 10 MG tablet     hydrOXYzine 25 MG tablet

## 2018-06-24 NOTE — PROGRESS NOTES
Ortho    No acute events overnight.  Both hip and sciatic pain improved today  Hoping to go to [facility] today then ex wife's single level home in [place] for a few weeks  House in [place] where he lives has 2-3 stories and he doesn't have help there    AFVSS  Pleasant, NAD  Nonlabored breathing  Right LE: Dressing cdi.  Fires ehl/fhl/gsc/ta c SILT dp/sp/tib n.  Toes warm    IMPRESSION:   1.  s/p right MARISOL 6.21.18    PLAN:   --WBAT RLE with PT for mobilization.     --DVT prophylaxis: ASA 325mg daily x 6 weeks   --[facility] today.  Follow up in 2 weeks for wound check    Dain Lawrence MD

## 2018-06-24 NOTE — PLAN OF CARE
Problem: Patient Care Overview  Goal: Plan of Care/Patient Progress Review  Outcome: No Change  Per Dr. Laird patient to have blood glucose monitored twice/day. Add to discharge instructions.

## 2018-06-24 NOTE — PLAN OF CARE
Problem: Patient Care Overview  Goal: Plan of Care/Patient Progress Review  Physical Therapy Discharge Summary    Reason for therapy discharge:    Discharged to transitional care facility.    Progress towards therapy goal(s). See goals on Care Plan in [facility] electronic health record for goal details.  Goals partially met.  Barriers to achieving goals:   limited tolerance for therapy.    Therapy recommendation(s):    Continued therapy is recommended.  Rationale/Recommendations:  R hip strengthening and gait training.     Rach Mckinney PT

## 2018-06-24 NOTE — PLAN OF CARE
Problem: Patient Care Overview  Goal: Plan of Care/Patient Progress Review  Outcome: Improving  Patient had loose bowel movement. Oxycodone 10 mg every 3 hours per patient request. Patient declines need for additional Atarax.

## 2018-06-24 NOTE — PLAN OF CARE
Problem: Patient Care Overview  Goal: Discharge Needs Assessment  Outcome: Completed Date Met: 06/24/18  CANDELARIO DAWSON DISCHARGE NOTE    Patient discharged to transitional care unit at 1:05 PM via wheel chair. Accompanied by daughter and staff. Discharge instructions reviewed with patient, opportunity offered to ask questions. Prescriptions sent with patient for TCU. All belongings sent with patient.    Eileen Haynes

## 2018-06-24 NOTE — PROGRESS NOTES
Name: Aldo Castillo    MRN#: [id number]    Reason for Hospitalization: right hip arthritis  S/P total hip arthroplasty    Discharge Date: 6/24/2018    Patient / Family response to discharge plan: [facility] (Main: [phone] Admissions: [phone] Fax: [phone]) at 1300 via dtr transport. All in agreement.     Other Providers (Care Coordinator, County Services, PCA services etc): No    CTS Hand Off Completed: Not needed    MALICK Score: low    Future Appointments: No future appointments.    Discharge Disposition: transitional care unit    Annalee HUIZAR, LICSW, [phone]

## 2018-06-24 NOTE — DISCHARGE SUMMARY
[facility] Discharge Summary                                  [facility]     DAVEY CHAVEZ [id number]   Age: 58 year old  PCP: Jesi Bauer, [phone] 1960     Date of Admission:  6/21/2018  Date of Discharge::  6/24/2018  Discharge Physician:  Dain Lawrence    Code status:  Prior    Admission Information:  Admission Diagnosis:  right hip arthritis  S/P total hip arthroplasty    Post-Operative Day: 3 Days Post-Op     Reason for admission:  The patient was admitted for the following:Procedure(s) (LRB):  ARTHROPLASTY HIP (Right)    Active Problems:    Type 2 diabetes mellitus with hyperglycemia, without long-term current use of insulin (H)    Morbid obesity, unspecified obesity type (H)    Dyslipidemia    S/P total hip arthroplasty, right    Sciatic leg pain    HTN (hypertension)      Allergies:  Review of patient's allergies indicates no known allergies.    Following the procedure noted above the patient was transferred to the post-op floor and started on:    Therapy:  physical therapy and occupational therapy  Anticoagulation Plan:  mg daily  for 42 days  Pain Management: oxycodone, tylenol and vistaril  Weight bearing status: Weight bearing as tolerated     The patient was followed and co-managed by the hospitalist service during the inpatient treatment course  Complications:  None  Consultations:  None     Pertinent Labs   Lab Results: personally reviewed.     Recent Labs   Lab Test  06/23/18   0609  06/22/18   0654   HGB  14.9  15.5   NA   --   136          Discharge Information:  Condition at discharge: Stable  Discharge destination:  Discharged to short-term care facility     Medications at discharge:  Current Discharge Medication List      START taking these medications    Details   acetaminophen (TYLENOL) 325 MG tablet Take 2 tablets (650 mg) by mouth every 4 hours as needed for other (multimodal surgical pain management along with NSAIDS and  opioid medication as indicated based on pain control and physical function.)  Qty: 100 tablet, Refills: 0    Associated Diagnoses: Status post total replacement of right hip      cyclobenzaprine (FLEXERIL) 10 MG tablet Take 1 tablet (10 mg) by mouth 3 times daily as needed for muscle spasms  Qty: 42 tablet, Refills: 0    Associated Diagnoses: Status post total replacement of right hip      hydrOXYzine (ATARAX) 25 MG tablet Take 1 tablet (25 mg) by mouth every 6 hours as needed for itching (Helps with muscle spasms, nausea)  Qty: 60 tablet, Refills: 0    Associated Diagnoses: Status post total replacement of right hip      oxyCODONE IR (ROXICODONE) 5 MG tablet Take 1-2 tablets (5-10 mg) by mouth every 3 hours as needed for other (pain control or improvement in physical function. Hold dose for analgesic side effects.)  Qty: 60 tablet, Refills: 0    Associated Diagnoses: Status post total replacement of right hip      senna-docusate (SENOKOT-S;PERICOLACE) 8.6-50 MG per tablet Take 1 tablet by mouth daily as needed for constipation  Qty: 15 tablet, Refills: 0    Associated Diagnoses: Status post total replacement of right hip         CONTINUE these medications which have CHANGED    Details   aspirin 325 MG EC tablet Take 1 tablet (325 mg) by mouth daily  Qty: 40 tablet, Refills: 0    Associated Diagnoses: Status post total replacement of right hip         CONTINUE these medications which have NOT CHANGED    Details   Atorvastatin Calcium (LIPITOR PO) Take 40 mg by mouth daily      blood glucose monitoring (ACCU-CHEK FASTCLIX) lancets Use to test blood sugar 1 times daily.  Qty: 102 each, Refills: 10    Comments: Please fax [phone] if lancets are not covered. Thanks!  Associated Diagnoses: Type 2 diabetes mellitus with hyperglycemia, without long-term current use of insulin (H)      blood glucose monitoring (ACCU-CHEK GUIDE) test strip Use to test blood sugar 1 times daily  Qty: 50 each, Refills: 10    Comments: Please  fax [phone] if these test strips are not covered.  Associated Diagnoses: Type 2 diabetes mellitus with hyperglycemia, without long-term current use of insulin (H)      Blood Glucose Monitoring Suppl (BLOOD GLUCOSE MONITOR SYSTEM) W/DEVICE KIT 2 times daily       glimepiride (AMARYL) 2 MG tablet Take 1 mg by mouth every morning (before breakfast)   Refills: 0      METFORMIN HCL PO Take 1,000 mg by mouth 2 times daily (with meals)      Naproxen Sodium (ALEVE PO) Take 440 mg by mouth 2 times daily as needed for moderate pain                        Follow-Up Care:  Patient should be seen in the office in 10-14 days by the Orthopedic Surgeon/Physician Assistant.  Call [phone] for appointment or questions.    Dain Lawrence

## 2018-06-24 NOTE — PLAN OF CARE
Problem: Hip Arthroplasty (Total, Partial) (Adult)  Goal: Signs and Symptoms of Listed Potential Problems Will be Absent, Minimized or Managed (Hip Arthroplasty)  Signs and symptoms of listed potential problems will be absent, minimized or managed by discharge/transition of care (reference Hip Arthroplasty (Total, Partial) (Adult) CPG).   Outcome: Improving  Patient is moving better and pain is better controlled.  Taking Tylenol and oxycodone.  Up in room with minimal assist and walker.  Anticipating discharge to TCU later today.

## 2018-06-25 ENCOUNTER — NURSING HOME VISIT (OUTPATIENT)
Dept: GERIATRICS | Facility: CLINIC | Age: 58
End: 2018-06-25
Payer: MEDICARE

## 2018-06-25 VITALS
HEART RATE: 89 BPM | DIASTOLIC BLOOD PRESSURE: 80 MMHG | OXYGEN SATURATION: 98 % | BODY MASS INDEX: 41.56 KG/M2 | WEIGHT: 298 LBS | SYSTOLIC BLOOD PRESSURE: 126 MMHG | TEMPERATURE: 97.9 F | RESPIRATION RATE: 10 BRPM

## 2018-06-25 DIAGNOSIS — Z47.1 AFTERCARE FOLLOWING RIGHT HIP JOINT REPLACEMENT SURGERY: ICD-10-CM

## 2018-06-25 DIAGNOSIS — I10 ESSENTIAL HYPERTENSION: ICD-10-CM

## 2018-06-25 DIAGNOSIS — E66.01 MORBID OBESITY (H): ICD-10-CM

## 2018-06-25 DIAGNOSIS — E11.65 TYPE 2 DIABETES MELLITUS WITH HYPERGLYCEMIA, WITHOUT LONG-TERM CURRENT USE OF INSULIN (H): ICD-10-CM

## 2018-06-25 DIAGNOSIS — Z96.641 STATUS POST TOTAL REPLACEMENT OF RIGHT HIP: Primary | ICD-10-CM

## 2018-06-25 DIAGNOSIS — M16.11 PRIMARY OSTEOARTHRITIS OF RIGHT HIP: ICD-10-CM

## 2018-06-25 DIAGNOSIS — Z96.641 AFTERCARE FOLLOWING RIGHT HIP JOINT REPLACEMENT SURGERY: ICD-10-CM

## 2018-06-25 DIAGNOSIS — M54.30 SCIATIC LEG PAIN: ICD-10-CM

## 2018-06-25 PROCEDURE — 99309 SBSQ NF CARE MODERATE MDM 30: CPT | Performed by: NURSE PRACTITIONER

## 2018-06-25 NOTE — PROGRESS NOTES
[place] [facility]  PRIMARY CARE PROVIDER AND CLINIC:  Jesi Bauer [facility] [place] [address] / [place] [address]  Chief Complaint   Patient presents with     Hospital F/U     [place] Medical Record Number:  [id number]    HPI:    Aldo Castillo is a 58 year old  (1960),admitted to the [facility]  from [facility].  Hospital stay 6/21/18 through 6/24/18.  Admitted to this facility for  rehab, medical management and nursing care.  HPI information obtained from: facility chart records, facility staff, patient report and [facility] chart review.      Aldo Castillo is a 57 yo male with PMH significant for DM2, obesity, DJD, HTN, sciatic leg pain who underwent elective R MARISOL after failed medical management of pain of R hip.     Current issues are:         Primary osteoarthritis of right hip  Status post total replacement of right hip  Aftercare following right hip joint replacement surgery  Sciatic leg pain  Essential hypertension  Type 2 diabetes mellitus with hyperglycemia, without long-term current use of insulin (H)  Morbid obesity (H)     Today, Aldo Castillo reports he is having mild to moderate pain which is well controlled with oxycodone use, wants only rare narcotic use as he does have past history of substance use. He reports no concerns, and has been participating in therapy.     CODE STATUS/ADVANCE DIRECTIVES DISCUSSION:   CPR/Full code   Patient's living condition: lives alone near [place], [place] and chose to come to [facility]    ALLERGIES:Review of patient's allergies indicates no known allergies.  PAST MEDICAL HISTORY:  has a past medical history of Diabetes (H). He also has no past medical history of Heart disease or PONV (postoperative nausea and vomiting).  PAST SURGICAL HISTORY:  has a past surgical history that includes Arthroplasty hip (Right, 6/21/2018).  FAMILY HISTORY: family history is negative  for Diabetes, Coronary Artery Disease, Hypertension, Hyperlipidemia, Breast Cancer, Colon Cancer, and Prostate Cancer.  SOCIAL HISTORY:  reports that he has been smoking Cigars.  He has quit using smokeless tobacco. He reports that he does not drink alcohol or use illicit drugs.    Post Discharge Medication Reconciliation Status: discharge medications reconciled and changed, per note/orders (see AVS).  Current Outpatient Prescriptions   Medication Sig Dispense Refill     Acetaminophen (TYLENOL PO) Take 650 mg by mouth 4 times daily       aspirin 325 MG EC tablet Take 1 tablet (325 mg) by mouth daily 40 tablet 0     Atorvastatin Calcium (LIPITOR PO) Take 40 mg by mouth daily       Blood Glucose Monitoring Suppl (BLOOD GLUCOSE MONITOR SYSTEM) W/DEVICE KIT 2 times daily        cyclobenzaprine (FLEXERIL) 10 MG tablet Take 1 tablet (10 mg) by mouth 3 times daily as needed for muscle spasms 42 tablet 0     glimepiride (AMARYL) 2 MG tablet Take 1 mg by mouth every morning (before breakfast)   0     hydrOXYzine (ATARAX) 25 MG tablet Take 1 tablet (25 mg) by mouth every 6 hours as needed for itching (Helps with muscle spasms, nausea) 60 tablet 0     METFORMIN HCL PO Take 1,000 mg by mouth 2 times daily (with meals)       Naproxen Sodium (ALEVE PO) Take 440 mg by mouth 2 times daily as needed for moderate pain       oxyCODONE IR (ROXICODONE) 5 MG tablet Take 1-2 tablets (5-10 mg) by mouth every 3 hours as needed for other (pain control or improvement in physical function. Hold dose for analgesic side effects.) 60 tablet 0     senna-docusate (SENOKOT-S;PERICOLACE) 8.6-50 MG per tablet Take 1 tablet by mouth daily as needed for constipation 15 tablet 0       ROS:  10 point ROS of systems including Constitutional, Eyes, Respiratory, Cardiovascular, Gastroenterology, Genitourinary, Integumentary, Muscularskeletal, Psychiatric were all negative except for pertinent positives noted in my HPI.    Exam:  /80  Pulse 89  Temp  97.9  F (36.6  C)  Resp 10  Wt 298 lb (135.2 kg)  SpO2 98%  BMI 41.56 kg/m2  GENERAL APPEARANCE:  Alert, in no distress, morbidly obese  ENT:  Mouth and posterior oropharynx normal, moist mucous membranes  EYES:  EOM, conjunctivae, lids, pupils and irises normal  RESP:  respiratory effort and palpation of chest normal, lungs clear to auscultation , no respiratory distress  CV:  Palpation and auscultation of heart done , regular rate and rhythm, no murmur, rub, or gallop, peripheral edema trace+ in R LE   M/S:   Gait and station abnormal walking slowly with RW and staff assist, tires easily  SKIN:  R hip incision is covered wtih aquacell dressing without surrounding erythema, no warmth, minimal bruising  NEURO:   Cranial nerves 2-12 are normal tested and grossly at patient's baseline  PSYCH:  normal insight, judgement and memory    Lab/Diagnostic data:     CBC RESULTS:   Recent Labs   Lab Test  06/23/18   0609  06/22/18   0654   HGB  14.9  15.5       Last Basic Metabolic Panel:  Recent Labs   Lab Test  06/22/18   0654   NA  136   POTASSIUM  4.4   CHLORIDE  103   RADHA  8.9   CO2  24   BUN  17   CR  0.78   GLC  170*         Lab Results   Component Value Date    A1C 11.0 09/21/2017       ASSESSMENT/PLAN:  Primary osteoarthritis of right hip  Status post total replacement of right hip  Aftercare following right hip joint replacement surgery  Admitted after elective R total hip, doing well with mild to moderate pain. On aspirin 325 daily x 42 days, then will resume aspirin 81 mg daily. On prn tylenol and oxycodone with vistaril for itching. Will schedule tylenol for better pain relief. Anticipate a short TCU stay as he is quite mobile but does live alone.     Sciatic leg pain  L leg, which is causing some discomfort. He is using ice, flexeril for this with good relief. The current medical regimen is effective;  continue present plan and medications.     Essential hypertension  On no antihypertensives, feeling well.    BP Readings from Last 2 Encounters:   06/25/18 126/80   06/24/18 132/88       Type 2 diabetes mellitus with hyperglycemia, without long-term current use of insulin (H)  Recent diagnosis with DM2, with A1C at time of diagnosis over 11%. Now A1C 6.9% per review of Care Everywhere records.   Lab Results   Component Value Date    A1C 11.0 09/21/2017          Morbid obesity (H)  Weight now about 270, reported to be as high as 350 at time of diagnosis with DM2.  Encouraged ongoing weight loss and healthy eating.        Orders:  1. DC prn tylenol  2. Tylenol 650 mg po QID Dx pain  3. Blood sugar checks once daily QAM Dx DM2      Electronically signed by:  THOMAS Barrios CNP

## 2018-06-25 NOTE — LETTER
6/25/2018        RE: Aldo Castillo  [address]  [place] [address]        [place] [facility]  PRIMARY CARE PROVIDER AND CLINIC:  Jesi Bauer [facility] [place] [address] / [place] [address]  Chief Complaint   Patient presents with     Hospital F/U     [place] Medical Record Number:  [id number]    HPI:    Aldo Castillo is a 58 year old  (1960),admitted to the [facility].  Hospital stay 6/21/18 through 6/24/18.  Admitted to this facility for  rehab, medical management and nursing care.  HPI information obtained from: facility chart records, facility staff, patient report and [facility] chart review.      Aldo Castillo is a 57 yo male with PMH significant for DM2, obesity, DJD, HTN, sciatic leg pain who underwent elective R MARISOL after failed medical management of pain of R hip.     Current issues are:         Primary osteoarthritis of right hip  Status post total replacement of right hip  Aftercare following right hip joint replacement surgery  Sciatic leg pain  Essential hypertension  Type 2 diabetes mellitus with hyperglycemia, without long-term current use of insulin (H)  Morbid obesity (H)     Today, Aldo Castillo reports he is having mild to moderate pain which is well controlled with oxycodone use, wants only rare narcotic use as he does have past history of substance use. He reports no concerns, and has been participating in therapy.     CODE STATUS/ADVANCE DIRECTIVES DISCUSSION:   CPR/Full code   Patient's living condition: lives alone near [place], [place] and chose to come to [facility]    ALLERGIES:Review of patient's allergies indicates no known allergies.  PAST MEDICAL HISTORY:  has a past medical history of Diabetes (H). He also has no past medical history of Heart disease or PONV (postoperative nausea and vomiting).  PAST SURGICAL HISTORY:  has a past surgical history that  includes Arthroplasty hip (Right, 6/21/2018).  FAMILY HISTORY: family history is negative for Diabetes, Coronary Artery Disease, Hypertension, Hyperlipidemia, Breast Cancer, Colon Cancer, and Prostate Cancer.  SOCIAL HISTORY:  reports that he has been smoking Cigars.  He has quit using smokeless tobacco. He reports that he does not drink alcohol or use illicit drugs.    Post Discharge Medication Reconciliation Status: discharge medications reconciled and changed, per note/orders (see AVS).  Current Outpatient Prescriptions   Medication Sig Dispense Refill     Acetaminophen (TYLENOL PO) Take 650 mg by mouth 4 times daily       aspirin 325 MG EC tablet Take 1 tablet (325 mg) by mouth daily 40 tablet 0     Atorvastatin Calcium (LIPITOR PO) Take 40 mg by mouth daily       Blood Glucose Monitoring Suppl (BLOOD GLUCOSE MONITOR SYSTEM) W/DEVICE KIT 2 times daily        cyclobenzaprine (FLEXERIL) 10 MG tablet Take 1 tablet (10 mg) by mouth 3 times daily as needed for muscle spasms 42 tablet 0     glimepiride (AMARYL) 2 MG tablet Take 1 mg by mouth every morning (before breakfast)   0     hydrOXYzine (ATARAX) 25 MG tablet Take 1 tablet (25 mg) by mouth every 6 hours as needed for itching (Helps with muscle spasms, nausea) 60 tablet 0     METFORMIN HCL PO Take 1,000 mg by mouth 2 times daily (with meals)       Naproxen Sodium (ALEVE PO) Take 440 mg by mouth 2 times daily as needed for moderate pain       oxyCODONE IR (ROXICODONE) 5 MG tablet Take 1-2 tablets (5-10 mg) by mouth every 3 hours as needed for other (pain control or improvement in physical function. Hold dose for analgesic side effects.) 60 tablet 0     senna-docusate (SENOKOT-S;PERICOLACE) 8.6-50 MG per tablet Take 1 tablet by mouth daily as needed for constipation 15 tablet 0       ROS:  10 point ROS of systems including Constitutional, Eyes, Respiratory, Cardiovascular, Gastroenterology, Genitourinary, Integumentary, Muscularskeletal, Psychiatric were all  negative except for pertinent positives noted in my HPI.    Exam:  /80  Pulse 89  Temp 97.9  F (36.6  C)  Resp 10  Wt 298 lb (135.2 kg)  SpO2 98%  BMI 41.56 kg/m2  GENERAL APPEARANCE:  Alert, in no distress, morbidly obese  ENT:  Mouth and posterior oropharynx normal, moist mucous membranes  EYES:  EOM, conjunctivae, lids, pupils and irises normal  RESP:  respiratory effort and palpation of chest normal, lungs clear to auscultation , no respiratory distress  CV:  Palpation and auscultation of heart done , regular rate and rhythm, no murmur, rub, or gallop, peripheral edema trace+ in R LE   M/S:   Gait and station abnormal walking slowly with RW and staff assist, tires easily  SKIN:  R hip incision is covered wtih aquacell dressing without surrounding erythema, no warmth, minimal bruising  NEURO:   Cranial nerves 2-12 are normal tested and grossly at patient's baseline  PSYCH:  normal insight, judgement and memory    Lab/Diagnostic data:     CBC RESULTS:   Recent Labs   Lab Test  06/23/18   0609  06/22/18   0654   HGB  14.9  15.5       Last Basic Metabolic Panel:  Recent Labs   Lab Test  06/22/18   0654   NA  136   POTASSIUM  4.4   CHLORIDE  103   RADHA  8.9   CO2  24   BUN  17   CR  0.78   GLC  170*         Lab Results   Component Value Date    A1C 11.0 09/21/2017       ASSESSMENT/PLAN:  Primary osteoarthritis of right hip  Status post total replacement of right hip  Aftercare following right hip joint replacement surgery  Admitted after elective R total hip, doing well with mild to moderate pain. On aspirin 325 daily x 42 days, then will resume aspirin 81 mg daily. On prn tylenol and oxycodone with vistaril for itching. Will schedule tylenol for better pain relief. Anticipate a short TCU stay as he is quite mobile but does live alone.     Sciatic leg pain  L leg, which is causing some discomfort. He is using ice, flexeril for this with good relief. The current medical regimen is effective;  continue  present plan and medications.     Essential hypertension  On no antihypertensives, feeling well.   BP Readings from Last 2 Encounters:   06/25/18 126/80   06/24/18 132/88       Type 2 diabetes mellitus with hyperglycemia, without long-term current use of insulin (H)  Recent diagnosis with DM2, with A1C at time of diagnosis over 11%. Now A1C 6.9% per review of Care Everywhere records.   Lab Results   Component Value Date    A1C 11.0 09/21/2017          Morbid obesity (H)  Weight now about 270, reported to be as high as 350 at time of diagnosis with DM2.  Encouraged ongoing weight loss and healthy eating.        Orders:  1. DC prn tylenol  2. Tylenol 650 mg po QID Dx pain  3. Blood sugar checks once daily QAM Dx DM2      Electronically signed by:  THOMAS Barrios CNP                    Sincerely,        THOMAS Barrios CNP

## 2018-07-02 VITALS
OXYGEN SATURATION: 95 % | SYSTOLIC BLOOD PRESSURE: 122 MMHG | DIASTOLIC BLOOD PRESSURE: 68 MMHG | BODY MASS INDEX: 38.22 KG/M2 | WEIGHT: 274 LBS | HEART RATE: 88 BPM | TEMPERATURE: 98.4 F | RESPIRATION RATE: 18 BRPM

## 2018-07-02 NOTE — PROGRESS NOTES
[place] [facility]    Chief Complaint   Patient presents with     group home Acute       [place] Medical Record Number:  [id number]    HPI:    Aldo Castillo is a 58 year old  (1960), who is being [facility] today [facility]  HPI information obtained from: facility chart records, facility staff, patient report and [facility] chart review.Today's concern is:     Primary osteoarthritis of right hip  Status post total replacement of right hip  Aftercare following right hip joint replacement surgery  Type 2 diabetes mellitus with hyperglycemia, without long-term current use of insulin (H)  Sciatic leg pain     Aldo Castillo reports that he is feeling well, has used no narcotic pain medication in the last several days.  He reports he is constipated but as needed laxatives helpful.  He offers no other concerns and hopes to discharge early next week.    ALLERGIES: Review of patient's allergies indicates no known allergies.  Past Medical, Surgical, Family and Social History reviewed and updated in [facility].    Current Outpatient Prescriptions   Medication Sig Dispense Refill     Acetaminophen (TYLENOL PO) Take 650 mg by mouth 4 times daily       aspirin 325 MG EC tablet Take 1 tablet (325 mg) by mouth daily 40 tablet 0     Atorvastatin Calcium (LIPITOR PO) Take 40 mg by mouth daily       Blood Glucose Monitoring Suppl (BLOOD GLUCOSE MONITOR SYSTEM) W/DEVICE KIT 2 times daily        cyclobenzaprine (FLEXERIL) 10 MG tablet Take 1 tablet (10 mg) by mouth 3 times daily as needed for muscle spasms 42 tablet 0     glimepiride (AMARYL) 2 MG tablet Take 1 mg by mouth every morning (before breakfast)   0     hydrOXYzine (ATARAX) 25 MG tablet Take 1 tablet (25 mg) by mouth every 6 hours as needed for itching (Helps with muscle spasms, nausea) 60 tablet 0     METFORMIN HCL PO Take 1,000 mg by mouth 2 times daily (with meals)       Naproxen Sodium (ALEVE PO) Take  440 mg by mouth 2 times daily as needed for moderate pain       oxyCODONE IR (ROXICODONE) 5 MG tablet Take 1-2 tablets (5-10 mg) by mouth every 3 hours as needed for other (pain control or improvement in physical function. Hold dose for analgesic side effects.) 60 tablet 0     senna-docusate (SENOKOT-S;PERICOLACE) 8.6-50 MG per tablet Take 1 tablet by mouth daily as needed for constipation 15 tablet 0     Medications reviewed:  Medications reconciled to facility chart and changes were made to reflect current medications as identified as above med list. Below are the changes that were made:   Medications stopped since last EPIC medication reconciliation:   There are no discontinued medications.    Medications started since last [facility] medication reconciliation:  No orders of the defined types were placed in this encounter.        REVIEW OF SYSTEMS:  4 point ROS including Respiratory, CV, GI and , other than that noted in the HPI,  is negative    Physical Exam:  /68  Pulse 88  Temp 98.4  F (36.9  C)  Resp 18  Wt 274 lb (124.3 kg)  SpO2 95%  BMI 38.22 kg/m2  GENERAL APPEARANCE:  Alert, in no distress  M/S:   Gait and station normal  With rolling walker  PSYCH:  oriented X 3    Recent Labs:     CBC RESULTS:   Recent Labs   Lab Test  06/23/18   0609  06/22/18   0654   HGB  14.9  15.5       Last Basic Metabolic Panel:  Recent Labs   Lab Test  06/22/18   0654   NA  136   POTASSIUM  4.4   CHLORIDE  103   RADHA  8.9   CO2  24   BUN  17   CR  0.78   GLC  170*           Assessment/Plan:  Primary osteoarthritis of right hip  Status post total replacement of right hip  Aftercare following right hip joint replacement surgery  continue therapy and current medical management, progressing well towards goal of discharge to home early next week.    Type 2 diabetes mellitus with hyperglycemia, without long-term current use of insulin (H)  Blood sugars trending 120-125 daily, on metformin and glimepiride.The current medical  regimen is effective;  continue present plan and medications.     Sciatic leg pain  Resolved.  No new pain.  No use of Flexeril or naproxen.       Orders:  1. No new order      Electronically signed by  THOMAS Barrios CNP

## 2018-07-03 ENCOUNTER — DISCHARGE SUMMARY NURSING HOME (OUTPATIENT)
Dept: GERIATRICS | Facility: CLINIC | Age: 58
End: 2018-07-03
Payer: MEDICARE

## 2018-07-03 DIAGNOSIS — E11.65 TYPE 2 DIABETES MELLITUS WITH HYPERGLYCEMIA, WITHOUT LONG-TERM CURRENT USE OF INSULIN (H): ICD-10-CM

## 2018-07-03 DIAGNOSIS — M54.30 SCIATIC LEG PAIN: ICD-10-CM

## 2018-07-03 DIAGNOSIS — Z96.641 STATUS POST TOTAL REPLACEMENT OF RIGHT HIP: ICD-10-CM

## 2018-07-03 DIAGNOSIS — Z96.641 AFTERCARE FOLLOWING RIGHT HIP JOINT REPLACEMENT SURGERY: ICD-10-CM

## 2018-07-03 DIAGNOSIS — Z47.1 AFTERCARE FOLLOWING RIGHT HIP JOINT REPLACEMENT SURGERY: ICD-10-CM

## 2018-07-03 DIAGNOSIS — M16.11 PRIMARY OSTEOARTHRITIS OF RIGHT HIP: Primary | ICD-10-CM

## 2018-07-03 PROCEDURE — 99308 SBSQ NF CARE LOW MDM 20: CPT | Performed by: NURSE PRACTITIONER

## 2018-07-03 NOTE — LETTER
7/3/2018        RE: Aldo Castillo  [address]  [place] [address]        [place] [facility]    Chief Complaint   Patient presents with     jail Acute       [place] Medical Record Number:  [id number]    HPI:    Aldo Castillo is a 58 year old  (1960), who is being seen today for an episodic care visit at [facility] .  HPI information obtained from: facility chart records, facility staff, patient report and [facility] chart review.Today's concern is:     Primary osteoarthritis of right hip  Status post total replacement of right hip  Aftercare following right hip joint replacement surgery  Type 2 diabetes mellitus with hyperglycemia, without long-term current use of insulin (H)  Sciatic leg pain     Aldo Castillo reports that he is feeling well, has used no narcotic pain medication in the last several days.  He reports he is constipated but as needed laxatives helpful.  He offers no other concerns and hopes to discharge early next week.    ALLERGIES: Review of patient's allergies indicates no known allergies.  Past Medical, Surgical, Family and Social History reviewed and updated in [facility].    Current Outpatient Prescriptions   Medication Sig Dispense Refill     Acetaminophen (TYLENOL PO) Take 650 mg by mouth 4 times daily       aspirin 325 MG EC tablet Take 1 tablet (325 mg) by mouth daily 40 tablet 0     Atorvastatin Calcium (LIPITOR PO) Take 40 mg by mouth daily       Blood Glucose Monitoring Suppl (BLOOD GLUCOSE MONITOR SYSTEM) W/DEVICE KIT 2 times daily        cyclobenzaprine (FLEXERIL) 10 MG tablet Take 1 tablet (10 mg) by mouth 3 times daily as needed for muscle spasms 42 tablet 0     glimepiride (AMARYL) 2 MG tablet Take 1 mg by mouth every morning (before breakfast)   0     hydrOXYzine (ATARAX) 25 MG tablet Take 1 tablet (25 mg) by mouth every 6 hours as needed for itching (Helps with muscle spasms, nausea) 60 tablet 0     METFORMIN HCL PO Take  1,000 mg by mouth 2 times daily (with meals)       Naproxen Sodium (ALEVE PO) Take 440 mg by mouth 2 times daily as needed for moderate pain       oxyCODONE IR (ROXICODONE) 5 MG tablet Take 1-2 tablets (5-10 mg) by mouth every 3 hours as needed for other (pain control or improvement in physical function. Hold dose for analgesic side effects.) 60 tablet 0     senna-docusate (SENOKOT-S;PERICOLACE) 8.6-50 MG per tablet Take 1 tablet by mouth daily as needed for constipation 15 tablet 0     Medications reviewed:  Medications reconciled to facility chart and changes were made to reflect current medications as identified as above med list. Below are the changes that were made:   Medications stopped since last EPIC medication reconciliation:   There are no discontinued medications.    Medications started since last [facility] medication reconciliation:  No orders of the defined types were placed in this encounter.        REVIEW OF SYSTEMS:  4 point ROS including Respiratory, CV, GI and , other than that noted in the HPI,  is negative    Physical Exam:  /68  Pulse 88  Temp 98.4  F (36.9  C)  Resp 18  Wt 274 lb (124.3 kg)  SpO2 95%  BMI 38.22 kg/m2  GENERAL APPEARANCE:  Alert, in no distress  M/S:   Gait and station normal  With rolling walker  PSYCH:  oriented X 3    Recent Labs:     CBC RESULTS:   Recent Labs   Lab Test  06/23/18   0609  06/22/18   0654   HGB  14.9  15.5       Last Basic Metabolic Panel:  Recent Labs   Lab Test  06/22/18   0654   NA  136   POTASSIUM  4.4   CHLORIDE  103   RADHA  8.9   CO2  24   BUN  17   CR  0.78   GLC  170*           Assessment/Plan:  Primary osteoarthritis of right hip  Status post total replacement of right hip  Aftercare following right hip joint replacement surgery  continue therapy and current medical management, progressing well towards goal of discharge to home early next week.    Type 2 diabetes mellitus with hyperglycemia, without long-term current use of insulin (H)  Blood  sugars trending 120-125 daily, on metformin and glimepiride.The current medical regimen is effective;  continue present plan and medications.     Sciatic leg pain  Resolved.  No new pain.  No use of Flexeril or naproxen.       Orders:  1. No new order      Electronically signed by  THOMAS Barrios CNP                      Sincerely,        THOMAS Barrios CNP

## 2018-07-09 ENCOUNTER — NURSING HOME VISIT (OUTPATIENT)
Dept: GERIATRICS | Facility: CLINIC | Age: 58
End: 2018-07-09
Payer: MEDICARE

## 2018-07-09 VITALS
BODY MASS INDEX: 38.22 KG/M2 | HEART RATE: 99 BPM | TEMPERATURE: 97.2 F | OXYGEN SATURATION: 95 % | RESPIRATION RATE: 18 BRPM | WEIGHT: 274 LBS | SYSTOLIC BLOOD PRESSURE: 135 MMHG | DIASTOLIC BLOOD PRESSURE: 84 MMHG

## 2018-07-09 DIAGNOSIS — M16.11 PRIMARY OSTEOARTHRITIS OF RIGHT HIP: Primary | ICD-10-CM

## 2018-07-09 DIAGNOSIS — M54.30 SCIATIC LEG PAIN: ICD-10-CM

## 2018-07-09 DIAGNOSIS — Z96.641 STATUS POST TOTAL REPLACEMENT OF RIGHT HIP: ICD-10-CM

## 2018-07-09 DIAGNOSIS — Z96.641 AFTERCARE FOLLOWING RIGHT HIP JOINT REPLACEMENT SURGERY: ICD-10-CM

## 2018-07-09 DIAGNOSIS — Z47.1 AFTERCARE FOLLOWING RIGHT HIP JOINT REPLACEMENT SURGERY: ICD-10-CM

## 2018-07-09 DIAGNOSIS — E66.01 MORBID OBESITY (H): ICD-10-CM

## 2018-07-09 DIAGNOSIS — E11.65 TYPE 2 DIABETES MELLITUS WITH HYPERGLYCEMIA, WITHOUT LONG-TERM CURRENT USE OF INSULIN (H): ICD-10-CM

## 2018-07-09 PROCEDURE — 99316 NF DSCHRG MGMT 30 MIN+: CPT | Performed by: NURSE PRACTITIONER

## 2018-07-09 NOTE — LETTER
7/9/2018        RE: Aldo Castillo  [address]  [place] [address]          [place] [facility] DISCHARGE SUMMARY    PATIENT'S NAME: Aldo Castillo  YOB: 1960  MEDICAL RECORD NUMBER:  [id number]    PRIMARY CARE PROVIDER AND CLINIC RESPONSIBLE AFTER TRANSFER: Jesi Bauer [facility] [place] [address] / [place] [address]     CODE STATUS/ADVANCE DIRECTIVES DISCUSSION:   CPR/Full code      No Known Allergies    TRANSFERRING PROVIDERS: THOMAS Barrios CNP,   DATE OF SNF ADMISSION:  June / 24 / 2018  DATE OF SNF (anticipated) DISCHARGE: July / 10 / 2018  DISCHARGE DISPOSITION: Non-[facility] Provider   Nursing Facility: [facility] by the [facility] stay 6/21/18 to 6/24/18.     Condition on Discharge:  Improving.  Function:  Ambulatory with rolling walker 150-300 ft  Cognitive Scores: SLUMS 26/30 and Safety 19/20    Equipment: walker    DISCHARGE DIAGNOSIS:   1. Primary osteoarthritis of right hip    2. Status post total replacement of right hip    3. Aftercare following right hip joint replacement surgery    4. Type 2 diabetes mellitus with hyperglycemia, without long-term current use of insulin (H)    5. Morbid obesity (H)    6. Sciatic leg pain        HPI Nursing Facility Course:  HPI information obtained from: facility chart records, facility staff, patient report and [facility] chart review.Pt had skilled nursing facility stay after R hip replacement. Nursing provided medication administration, wound care to hip incision, diabetic management and education, pain management. Pt participated in PT/OT 5-7 x/wk. Pt stable at time of discharge.  Primary osteoarthritis of right hip  Status post total replacement of right hip  Aftercare following right hip joint replacement surgery  Elective R total hip due to significant pain and failed medical management. He tolerated advancing therapy well and is now pain free, incision is CDI  and well healed without drainage or erythema, and he is ambulating well. He will be staying at a cabin in [place], [place], without stairs, until his follow up appointment with ortho in early August at which time he hopes to be able to return to his home in [facility] and resume driving. He will not be sent home with any narcotic pain medications, has not needed them.     Type 2 diabetes mellitus with hyperglycemia, without long-term current use of insulin (H)  Blood sugar trending 100-200 while in TCU, on metformin and glimepiride. Stable, compensated. The current medical regimen is effective;  continue present plan and medications.     Morbid obesity (H)  Weight stable during TCU stay, but reports he has continued to lose weight since his DM2 diagnosis, which is desired. He reports ongoing motivation to continue to lose weight.     Sciatic leg pain  Did have some mild L sciatic pain which he reports is ongoing, but reports also that this is improving with his improved mobility. Stable.       PAST MEDICAL HISTORY:  has a past medical history of Diabetes (H). He also has no past medical history of Heart disease or PONV (postoperative nausea and vomiting).    DISCHARGE MEDICATIONS:  Current Outpatient Prescriptions   Medication Sig Dispense Refill     Acetaminophen (TYLENOL PO) Take 650 mg by mouth 4 times daily       aspirin 325 MG EC tablet Take 1 tablet (325 mg) by mouth daily 40 tablet 0     Atorvastatin Calcium (LIPITOR PO) Take 40 mg by mouth daily       Blood Glucose Monitoring Suppl (BLOOD GLUCOSE MONITOR SYSTEM) W/DEVICE KIT 2 times daily        glimepiride (AMARYL) 2 MG tablet Take 1 mg by mouth every morning (before breakfast)   0     METFORMIN HCL PO Take 1,000 mg by mouth 2 times daily (with meals)       senna-docusate (SENOKOT-S;PERICOLACE) 8.6-50 MG per tablet Take 1 tablet by mouth daily as needed for constipation 15 tablet 0       MEDICATION CHANGES/RATIONALE:   None    Controlled medications sent  with patient:   not applicable/none     ROS:    4 point ROS including Respiratory, CV, GI and , other than that noted in the HPI,  is negative    Physical Exam:   Vitals: /84  Pulse 99  Temp 97.2  F (36.2  C)  Resp 18  Wt 274 lb (124.3 kg)  SpO2 95%  BMI 38.22 kg/m2  BMI= Body mass index is 38.22 kg/(m^2).    GENERAL APPEARANCE:  Alert, in no distress, appears healthy  RESP:  no respiratory distress  M/S:   Gait and station abnormal walking slowly with rolling walker  PSYCH:  normal insight, judgement and memory    DISCHARGE PLAN:  Occupational Therapy, Physical Therapy, Registered Nurse, Home Health Aide and From:  Tino Antonio  Patient instructed to follow-up with:  PCP in 7 days      Current [place] scheduled appointments:  No future appointments.    MTM referral needed and placed by this provider: No    Pending labs: none  SNF labs none  Discharge Treatments:  1. DC following meds due to no use- Flexeril, aleve, oxycodone, hydroxyzine.  2. May discharge to home with all current meds and treatments  3. F/U with PCP in 7-10 days  4. [facility] to eval and treat PT/OT/RN/HHA Dx Right MARISOL        TOTAL DISCHARGE TIME:   Greater than 30 minutes  Electronically signed by:  THOMAS Barrios CNP     Documentation of Face to Face and Certification for Home Health Services    I certify that patient: Aldo Castillo is under my care and that I, or a nurse practitioner or physician's assistant working with me, had a face-to-face encounter that meets the physician face-to-face encounter requirements with this patient on: 7/9/2018.    This encounter with the patient was in whole, or in part, for the following medical condition, which is the primary reason for home health care:  R hip replacement.    I certify that, based on my findings, the following services are medically necessary home health services: Nursing, Occupational Therapy, Physical Therapy and HHA.    My clinical findings support the need  for the above services because: Nurse is needed: To provide caregiver training to assist with:  R hip replacement.., Occupational Therapy Services are needed to assess and treat cognitive ability and address ADL safety due to impairment in  R hip replacement. and Physical Therapy Services are needed to assess and treat the following functional impairments:  R hip replacement.    Further, I certify that my clinical findings support that this patient is homebound (i.e. absences from home require considerable and taxing effort and are for medical reasons or [facility] services or infrequently or of short duration when for other reasons) because: Leaving home is medically contraindicated for the following reason(s): decreased moblity . and Requires assistance of another person or specialized equipment to access medical services because patient: Range of motion limitations prevents ability to exit home safely...    Based on the above findings. I certify that this patient is confined to the home and needs intermittent skilled nursing care, physical therapy and/or speech therapy.  The patient is under my care, and I have initiated the establishment of the plan of care.  This patient will be followed by a physician who will periodically review the plan of care.  Physician/Provider to provide follow up care: Jesi Bauer    Attending hospital physician (the Medicare certified [facility] provider): THOMAS Barrios CNP   Physician Signature: See electronic signature associated with these discharge orders.  Date: 7/9/2018        Sincerely,        THOMAS Barrios CNP

## 2018-07-09 NOTE — PROGRESS NOTES
[place] [facility] DISCHARGE SUMMARY    PATIENT'S NAME: Aldo Castillo  YOB: 1960  MEDICAL RECORD NUMBER:  [id number]    PRIMARY CARE PROVIDER AND CLINIC RESPONSIBLE AFTER TRANSFER: Jesi Bauer [facility] [place] [address] / [place] [address]     CODE STATUS/ADVANCE DIRECTIVES DISCUSSION:   CPR/Full code      No Known Allergies    TRANSFERRING PROVIDERS: THOMAS Barrios CNP,   DATE OF SNF ADMISSION:  June / 24 / 2018  DATE OF SNF (anticipated) DISCHARGE: July / 10 / 2018  DISCHARGE DISPOSITION: Non-[facility] Provider   Nursing Facility: [facility] by the [facility] stay 6/21/18 to 6/24/18.     Condition on Discharge:  Improving.  Function:  Ambulatory with rolling walker 150-300 ft  Cognitive Scores: SLUMS 26/30 and Safety 19/20    Equipment: walker    DISCHARGE DIAGNOSIS:   1. Primary osteoarthritis of right hip    2. Status post total replacement of right hip    3. Aftercare following right hip joint replacement surgery    4. Type 2 diabetes mellitus with hyperglycemia, without long-term current use of insulin (H)    5. Morbid obesity (H)    6. Sciatic leg pain        HPI Nursing Facility Course:  HPI information obtained from: facility chart records, facility staff, patient report and [facility] chart review.Pt had skilled nursing facility stay after R hip replacement. Nursing provided medication administration, wound care to hip incision, diabetic management and education, pain management. Pt participated in PT/OT 5-7 x/wk. Pt stable at time of discharge.  Primary osteoarthritis of right hip  Status post total replacement of right hip  Aftercare following right hip joint replacement surgery  Elective R total hip due to significant pain and failed medical management. He tolerated advancing therapy well and is now pain free, incision is CDI and well healed without drainage or erythema, and he is ambulating well. He will be  staying at a cabin in [place], [place], without stairs, until his follow up appointment with ortho in early August at which time he hopes to be able to return to his home in [place] [facility] and resume driving. He will not be sent home with any narcotic pain medications, has not needed them.     Type 2 diabetes mellitus with hyperglycemia, without long-term current use of insulin (H)  Blood sugar trending 100-200 while in TCU, on metformin and glimepiride. Stable, compensated. The current medical regimen is effective;  continue present plan and medications.     Morbid obesity (H)  Weight stable during TCU stay, but reports he has continued to lose weight since his DM2 diagnosis, which is desired. He reports ongoing motivation to continue to lose weight.     Sciatic leg pain  Did have some mild L sciatic pain which he reports is ongoing, but reports also that this is improving with his improved mobility. Stable.       PAST MEDICAL HISTORY:  has a past medical history of Diabetes (H). He also has no past medical history of Heart disease or PONV (postoperative nausea and vomiting).    DISCHARGE MEDICATIONS:  Current Outpatient Prescriptions   Medication Sig Dispense Refill     Acetaminophen (TYLENOL PO) Take 650 mg by mouth 4 times daily       aspirin 325 MG EC tablet Take 1 tablet (325 mg) by mouth daily 40 tablet 0     Atorvastatin Calcium (LIPITOR PO) Take 40 mg by mouth daily       Blood Glucose Monitoring Suppl (BLOOD GLUCOSE MONITOR SYSTEM) W/DEVICE KIT 2 times daily        glimepiride (AMARYL) 2 MG tablet Take 1 mg by mouth every morning (before breakfast)   0     METFORMIN HCL PO Take 1,000 mg by mouth 2 times daily (with meals)       senna-docusate (SENOKOT-S;PERICOLACE) 8.6-50 MG per tablet Take 1 tablet by mouth daily as needed for constipation 15 tablet 0       MEDICATION CHANGES/RATIONALE:   None    Controlled medications sent with patient:   not applicable/none     ROS:    4 point ROS including Respiratory, CV,  GI and , other than that noted in the HPI,  is negative    Physical Exam:   Vitals: /84  Pulse 99  Temp 97.2  F (36.2  C)  Resp 18  Wt 274 lb (124.3 kg)  SpO2 95%  BMI 38.22 kg/m2  BMI= Body mass index is 38.22 kg/(m^2).    GENERAL APPEARANCE:  Alert, in no distress, appears healthy  RESP:  no respiratory distress  M/S:   Gait and station abnormal walking slowly with rolling walker  PSYCH:  normal insight, judgement and memory    DISCHARGE PLAN:  Occupational Therapy, Physical Therapy, Registered Nurse, Home Health Aide and From:  Tino Antonio  Patient instructed to follow-up with:  PCP in 7 days      Current [place] scheduled appointments:  No future appointments.    MTM referral needed and placed by this provider: No    Pending labs: none  SNF labs none  Discharge Treatments:  1. DC following meds due to no use- Flexeril, aleve, oxycodone, hydroxyzine.  2. May discharge to home with all current meds and treatments  3. F/U with PCP in 7-10 days  4. [facility] to eval and treat PT/OT/RN/HHA Dx Right MARISOL        TOTAL DISCHARGE TIME:   Greater than 30 minutes  Electronically signed by:  THOMAS Barrios CNP     Documentation of Face to Face and Certification for Home Health Services    I certify that patient: Aldo Castillo is under my care and that I, or a nurse practitioner or physician's assistant working with me, had a face-to-face encounter that meets the physician face-to-face encounter requirements with this patient on: 7/9/2018.    This encounter with the patient was in whole, or in part, for the following medical condition, which is the primary reason for home health care:  R hip replacement.    I certify that, based on my findings, the following services are medically necessary home health services: Nursing, Occupational Therapy, Physical Therapy and HHA.    My clinical findings support the need for the above services because: Nurse is needed: To provide caregiver training to assist  with:  R hip replacement.., Occupational Therapy Services are needed to assess and treat cognitive ability and address ADL safety due to impairment in  R hip replacement. and Physical Therapy Services are needed to assess and treat the following functional impairments:  R hip replacement.    Further, I certify that my clinical findings support that this patient is homebound (i.e. absences from home require considerable and taxing effort and are for medical reasons or [facility] services or infrequently or of short duration when for other reasons) because: Leaving home is medically contraindicated for the following reason(s): decreased moblity . and Requires assistance of another person or specialized equipment to access medical services because patient: Range of motion limitations prevents ability to exit home safely...    Based on the above findings. I certify that this patient is confined to the home and needs intermittent skilled nursing care, physical therapy and/or speech therapy.  The patient is under my care, and I have initiated the establishment of the plan of care.  This patient will be followed by a physician who will periodically review the plan of care.  Physician/Provider to provide follow up care: Jesi Bauer    Attending hospital physician (the Medicare certified [facility] provider): THOMAS Barrios CNP   Physician Signature: See electronic signature associated with these discharge orders.  Date: 7/9/2018

## 2019-07-03 ENCOUNTER — TRANSFERRED RECORDS (OUTPATIENT)
Dept: HEALTH INFORMATION MANAGEMENT | Facility: CLINIC | Age: 59
End: 2019-07-03

## 2019-10-02 ENCOUNTER — TRANSFERRED RECORDS (OUTPATIENT)
Dept: HEALTH INFORMATION MANAGEMENT | Facility: CLINIC | Age: 59
End: 2019-10-02

## 2020-01-27 ENCOUNTER — TRANSFERRED RECORDS (OUTPATIENT)
Dept: HEALTH INFORMATION MANAGEMENT | Facility: CLINIC | Age: 60
End: 2020-01-27

## 2020-02-24 ENCOUNTER — TRANSFERRED RECORDS (OUTPATIENT)
Dept: HEALTH INFORMATION MANAGEMENT | Facility: CLINIC | Age: 60
End: 2020-02-24

## 2020-03-04 ENCOUNTER — OFFICE VISIT (OUTPATIENT)
Dept: FAMILY MEDICINE | Facility: OTHER | Age: 60
End: 2020-03-04
Attending: NURSE PRACTITIONER
Payer: MEDICARE

## 2020-03-04 ENCOUNTER — OFFICE VISIT (OUTPATIENT)
Dept: PODIATRY | Facility: OTHER | Age: 60
End: 2020-03-04
Attending: PODIATRIST
Payer: MEDICARE

## 2020-03-04 VITALS
HEART RATE: 109 BPM | WEIGHT: 298 LBS | DIASTOLIC BLOOD PRESSURE: 62 MMHG | OXYGEN SATURATION: 96 % | BODY MASS INDEX: 40.36 KG/M2 | HEIGHT: 72 IN | TEMPERATURE: 97.2 F | SYSTOLIC BLOOD PRESSURE: 115 MMHG

## 2020-03-04 VITALS
HEART RATE: 109 BPM | DIASTOLIC BLOOD PRESSURE: 62 MMHG | TEMPERATURE: 97.2 F | OXYGEN SATURATION: 96 % | SYSTOLIC BLOOD PRESSURE: 115 MMHG

## 2020-03-04 DIAGNOSIS — R20.8 LOSS OF PROTECTIVE SENSATION OF SKIN OF DEFORMED FOOT: ICD-10-CM

## 2020-03-04 DIAGNOSIS — L84 CALLUS OF FOOT: ICD-10-CM

## 2020-03-04 DIAGNOSIS — E11.9 DIABETES MELLITUS TYPE 2, NONINSULIN DEPENDENT (H): ICD-10-CM

## 2020-03-04 DIAGNOSIS — L60.3 ONYCHODYSTROPHY: Primary | ICD-10-CM

## 2020-03-04 DIAGNOSIS — I10 ESSENTIAL HYPERTENSION: ICD-10-CM

## 2020-03-04 DIAGNOSIS — I83.11 VARICOSE VEINS OF BOTH LOWER EXTREMITIES WITH INFLAMMATION: ICD-10-CM

## 2020-03-04 DIAGNOSIS — K21.9 GASTROESOPHAGEAL REFLUX DISEASE, ESOPHAGITIS PRESENCE NOT SPECIFIED: ICD-10-CM

## 2020-03-04 DIAGNOSIS — M67.88 ACHILLES TENDINOSIS OF BOTH LOWER EXTREMITIES: ICD-10-CM

## 2020-03-04 DIAGNOSIS — E11.65 TYPE 2 DIABETES MELLITUS WITH HYPERGLYCEMIA, WITHOUT LONG-TERM CURRENT USE OF INSULIN (H): Primary | ICD-10-CM

## 2020-03-04 DIAGNOSIS — I83.12 VARICOSE VEINS OF BOTH LOWER EXTREMITIES WITH INFLAMMATION: ICD-10-CM

## 2020-03-04 DIAGNOSIS — L60.2 HYPERTROPHIC TOENAIL: ICD-10-CM

## 2020-03-04 DIAGNOSIS — E78.5 HYPERLIPIDEMIA LDL GOAL <100: ICD-10-CM

## 2020-03-04 DIAGNOSIS — E11.42 DIABETIC POLYNEUROPATHY ASSOCIATED WITH TYPE 2 DIABETES MELLITUS (H): ICD-10-CM

## 2020-03-04 DIAGNOSIS — Z79.899 ON STATIN THERAPY: ICD-10-CM

## 2020-03-04 DIAGNOSIS — M21.969 LOSS OF PROTECTIVE SENSATION OF SKIN OF DEFORMED FOOT: ICD-10-CM

## 2020-03-04 LAB
BASOPHILS # BLD AUTO: 0 10E9/L (ref 0–0.2)
BASOPHILS NFR BLD AUTO: 0.4 %
DIFFERENTIAL METHOD BLD: NORMAL
EOSINOPHIL # BLD AUTO: 0.1 10E9/L (ref 0–0.7)
EOSINOPHIL NFR BLD AUTO: 1.3 %
ERYTHROCYTE [DISTWIDTH] IN BLOOD BY AUTOMATED COUNT: 13.8 % (ref 10–15)
HCT VFR BLD AUTO: 48.3 % (ref 40–53)
HGB BLD-MCNC: 16.6 G/DL (ref 13.3–17.7)
LYMPHOCYTES # BLD AUTO: 2.1 10E9/L (ref 0.8–5.3)
LYMPHOCYTES NFR BLD AUTO: 25.3 %
MCH RBC QN AUTO: 29.2 PG (ref 26.5–33)
MCHC RBC AUTO-ENTMCNC: 34.4 G/DL (ref 31.5–36.5)
MCV RBC AUTO: 85 FL (ref 78–100)
MONOCYTES # BLD AUTO: 0.6 10E9/L (ref 0–1.3)
MONOCYTES NFR BLD AUTO: 7.8 %
NEUTROPHILS # BLD AUTO: 5.4 10E9/L (ref 1.6–8.3)
NEUTROPHILS NFR BLD AUTO: 65.2 %
PLATELET # BLD AUTO: 272 10E9/L (ref 150–450)
RBC # BLD AUTO: 5.69 10E12/L (ref 4.4–5.9)
WBC # BLD AUTO: 8.2 10E9/L (ref 4–11)

## 2020-03-04 PROCEDURE — 97597 DBRDMT OPN WND 1ST 20 CM/<: CPT | Performed by: PODIATRIST

## 2020-03-04 PROCEDURE — 82043 UR ALBUMIN QUANTITATIVE: CPT | Mod: ZL | Performed by: NURSE PRACTITIONER

## 2020-03-04 PROCEDURE — 40000788 ZZHCL STATISTIC ESTIMATED AVERAGE GLUCOSE: Mod: ZL | Performed by: NURSE PRACTITIONER

## 2020-03-04 PROCEDURE — 84443 ASSAY THYROID STIM HORMONE: CPT | Mod: ZL | Performed by: NURSE PRACTITIONER

## 2020-03-04 PROCEDURE — G0463 HOSPITAL OUTPT CLINIC VISIT: HCPCS

## 2020-03-04 PROCEDURE — 84439 ASSAY OF FREE THYROXINE: CPT | Mod: ZL | Performed by: NURSE PRACTITIONER

## 2020-03-04 PROCEDURE — 80061 LIPID PANEL: CPT | Mod: ZL | Performed by: NURSE PRACTITIONER

## 2020-03-04 PROCEDURE — 85025 COMPLETE CBC W/AUTO DIFF WBC: CPT | Mod: ZL | Performed by: NURSE PRACTITIONER

## 2020-03-04 PROCEDURE — 99203 OFFICE O/P NEW LOW 30 MIN: CPT | Mod: 25 | Performed by: PODIATRIST

## 2020-03-04 PROCEDURE — 80053 COMPREHEN METABOLIC PANEL: CPT | Mod: ZL | Performed by: NURSE PRACTITIONER

## 2020-03-04 PROCEDURE — 11056 PARNG/CUTG B9 HYPRKR LES 2-4: CPT | Performed by: PODIATRIST

## 2020-03-04 PROCEDURE — 83036 HEMOGLOBIN GLYCOSYLATED A1C: CPT | Mod: ZL | Performed by: NURSE PRACTITIONER

## 2020-03-04 PROCEDURE — G0463 HOSPITAL OUTPT CLINIC VISIT: HCPCS | Mod: 25

## 2020-03-04 PROCEDURE — 99204 OFFICE O/P NEW MOD 45 MIN: CPT | Performed by: NURSE PRACTITIONER

## 2020-03-04 PROCEDURE — 11721 DEBRIDE NAIL 6 OR MORE: CPT | Performed by: PODIATRIST

## 2020-03-04 PROCEDURE — 36415 COLL VENOUS BLD VENIPUNCTURE: CPT | Mod: ZL | Performed by: NURSE PRACTITIONER

## 2020-03-04 RX ORDER — ERGOCALCIFEROL 1.25 MG/1
50000 CAPSULE, LIQUID FILLED ORAL WEEKLY
COMMUNITY

## 2020-03-04 RX ORDER — LISINOPRIL AND HYDROCHLOROTHIAZIDE 12.5; 2 MG/1; MG/1
1 TABLET ORAL DAILY
COMMUNITY
Start: 2020-01-27 | End: 2020-06-04

## 2020-03-04 RX ORDER — SIMVASTATIN 20 MG
20 TABLET ORAL DAILY
COMMUNITY
Start: 2020-01-27 | End: 2020-06-04

## 2020-03-04 RX ORDER — AMITRIPTYLINE HYDROCHLORIDE 50 MG/1
50 TABLET ORAL DAILY
COMMUNITY
Start: 2020-01-27

## 2020-03-04 RX ORDER — AMMONIUM LACTATE 12 G/100G
LOTION TOPICAL 2 TIMES DAILY
Qty: 567 G | Refills: 4 | Status: SHIPPED | OUTPATIENT
Start: 2020-03-04 | End: 2020-11-20

## 2020-03-04 RX ORDER — OMEPRAZOLE 40 MG/1
40 CAPSULE, DELAYED RELEASE ORAL DAILY
Qty: 90 CAPSULE | Refills: 1 | Status: SHIPPED | OUTPATIENT
Start: 2020-03-04

## 2020-03-04 ASSESSMENT — PAIN SCALES - GENERAL
PAINLEVEL: MILD PAIN (2)
PAINLEVEL: MILD PAIN (2)

## 2020-03-04 ASSESSMENT — MIFFLIN-ST. JEOR: SCORE: 2209.72

## 2020-03-04 NOTE — NURSING NOTE
Chief Complaint   Patient presents with     Diabetes     Establish Care     Lipids     Hypertension       Initial /62 (BP Location: Left arm, Patient Position: Chair, Cuff Size: Adult Large)   Pulse 109   Temp 97.2  F (36.2  C) (Tympanic)   Ht 1.829 m (6')   Wt 135.2 kg (298 lb)   SpO2 96%   BMI 40.42 kg/m   Estimated body mass index is 40.42 kg/m  as calculated from the following:    Height as of this encounter: 1.829 m (6').    Weight as of this encounter: 135.2 kg (298 lb).  Medication Reconciliation: complete  ZEE LOPEZ LPN

## 2020-03-04 NOTE — PATIENT INSTRUCTIONS
Assessment & Plan     1. Type 2 diabetes mellitus with hyperglycemia, without long-term current use of insulin (H)  - Hemoglobin A1c  - Albumin Random Urine Quantitative with Creat Ratio  - C FOOT EXAM  NO CHARGE  - metFORMIN (GLUCOPHAGE) 1000 MG tablet; Take 1 tablet (1,000 mg) by mouth 2 times daily (with meals)  Dispense: 180 tablet; Refill: 1  - if A1c is over 9% - will add Januvia.      2. Hyperlipidemia LDL goal <100  - Lipid Profile    3. Essential hypertension  - Comprehensive metabolic panel  - TSH with free T4 reflex  - CBC with platelets and differential    4. On statin therapy  - Comprehensive metabolic panel    5. Hypertrophic toenail  Dr Liao  - PODIATRY/FOOT & ANKLE SURGERY REFERRAL    6. Callus of foot  As above  - PODIATRY/FOOT & ANKLE SURGERY REFERRAL     BMI:   Estimated body mass index is 40.42 kg/m  as calculated from the following:    Height as of this encounter: 1.829 m (6').    Weight as of this encounter: 135.2 kg (298 lb).       Return in about 3 months (around 6/4/2020) for diabetes, lipids, HTN.    Viviane Dye NP  [facility]

## 2020-03-04 NOTE — PROGRESS NOTES
Chief complaint: Patient presents with:  Toenail: Trimming        History of Present Illness: This 59 year old NIDDM II male is seen today at the request of his PCP, Moisés Dye, while he was following up for his PCP appointment for evaluation and suggestions of management of diabetic foot care and bilateral Achilles tendon pain.    Patient is unable to reach his feet to trim his nails or par his calluses. He has been unable to reach his feet to care of them in a long time. He also says his oldest son  from a drug overdose some time ago, so he was not taking care of himself. He is now back to seeing his PCP and managing his health.    He has had bilateral Achilles tendon pain for a long time. He says two previous doctors confirmed that 2/3 of his LEFT Achilles tendon is torn. It has been like this for a long time. He went through PT for a year and it did not help. He still has pain going down stairs.    He also complains of bilateral calf cramping in the middle of the night and tingling, burning and numbness in the bilateral feet. No further pedal complaints today.     Last HbA1C was 11.0% on 2017 and patient said it was 10.5% in  at the [facility]. He had labs today on 2020 to check his A1C again.         /62   Pulse 109   Temp 97.2  F (36.2  C) (Tympanic)   SpO2 96%     Patient Active Problem List   Diagnosis     Type 2 diabetes mellitus with hyperglycemia, without long-term current use of insulin (H)     Degenerative joint disease (DJD) of hip     Morbid obesity (H)     Hyperlipidemia LDL goal <100     S/P total hip arthroplasty, right     Sciatic leg pain     Essential hypertension       Past Surgical History:   Procedure Laterality Date     ARTHROPLASTY HIP Right 2018    Procedure: ARTHROPLASTY HIP;  Right Total Hip Arthroplasty;  Surgeon: Dain Lawrence MD;  Location: [facility] OR     KNEE SURGERY Right        Current Outpatient Medications   Medication      amitriptyline (ELAVIL) 50 MG tablet     Blood Glucose Monitoring Suppl (BLOOD GLUCOSE MONITOR SYSTEM) W/DEVICE KIT     lisinopril-hydrochlorothiazide (ZESTORETIC) 20-12.5 MG tablet     metFORMIN (GLUCOPHAGE) 1000 MG tablet     omeprazole (PRILOSEC) 40 MG DR capsule     simvastatin (ZOCOR) 20 MG tablet     vitamin D2 (ERGOCALCIFEROL) 62043 units (1250 mcg) capsule     No current facility-administered medications for this visit.         No Known Allergies    Family History   Problem Relation Age of Onset     Cancer Mother      Diabetes No family hx of      Coronary Artery Disease No family hx of      Hypertension No family hx of      Hyperlipidemia No family hx of      Breast Cancer No family hx of      Colon Cancer No family hx of      Prostate Cancer No family hx of        Social History     Socioeconomic History     Marital status:      Spouse name: None     Number of children: None     Years of education: None     Highest education level: None   Occupational History     None   Social Needs     Financial resource strain: None     Food insecurity:     Worry: None     Inability: None     Transportation needs:     Medical: None     Non-medical: None   Tobacco Use     Smoking status: Former Smoker     Types: Cigars     Start date: 1978     Last attempt to quit: 2018     Years since quittin.7     Smokeless tobacco: Former User   Substance and Sexual Activity     Alcohol use: No     Drug use: No     Sexual activity: None   Lifestyle     Physical activity:     Days per week: None     Minutes per session: None     Stress: None   Relationships     Social connections:     Talks on phone: None     Gets together: None     Attends Rastafari service: None     Active member of club or organization: None     Attends meetings of clubs or organizations: None     Relationship status: None     Intimate partner violence:     Fear of current or ex partner: None     Emotionally abused: None     Physically abused:  None     Forced sexual activity: None   Other Topics Concern     Parent/sibling w/ CABG, MI or angioplasty before 65F 55M? Not Asked   Social History Narrative     None       ROS: 10 point ROS neg other than the symptoms noted above in the HPI.  EXAM  Constitutional: healthy, alert and no distress    Psychiatric: mentation appears normal and affect normal/bright    VASCULAR:  -Dorsalis pedis pulse +2/4 b/l  -Posterior tibial pulse +1/4 b/l  -Capillary refill time < 3 seconds to b/l hallux  -Hair growth Absent to b/l anterior legs and ankles  -Varicosities to bilateral feet  -Mild 1+ pitting edema to bilateral feet and ankles    NEURO:  -Protective sensation diminished with SWM +1/10 RIGHT and +4/10 LEFT on 03/04/2020  -Light touch sensation intact to b/l plantar forefoot  DERM:  Wound Location:  LEFT distal hallux  03/04/2020  Measurement:  Estimated to measure 0.1cm x 1.0cm x 0.1cm through the skin only  Drainage:  Minimal serous  Odor:  None  Undermining:  None post debridement  Edges:  Significant hyperkeratotic lesion   Base:  100% viable through skin only  Surrounding Skin: Intact but dry and hyperkeratotic skin    -Skin temperature cool to bilateral feet  -Skin texture excessively thickened and atrophic to bilateral feet  -Hyperkeratotic lesion to RIGHT posterior heel, RIGHT distal posterior leg, LEFT heel and distal RIGHT bilateral hallux with no wounds post paring  -Toenails elongated, thickened, dystrophic and discolored x 10  MSK:  -Pain on palpation to RIGHT posterior heel at Achilles tendon insertion with direct palpation and with side-to-side compression  -Pain on palpation to LEFT posterior Achilles tendon approximately 4cm proximal to insertion with side-to-side squeezing of tendon  -Atrophy of LEFT calf compared to RIGHT calf  -Muscle strength of ankles +5/5 for dorsiflexion, plantarflexion, ABDUction and ADDuction b/l with pain against resistance to plantarflexion bilaterally        ============================================================    ASSESSMENT:  (L60.3) Onychodystrophy  (primary encounter diagnosis)    (L84) Callus of foot    (M67.88) Achilles tendinosis of both lower extremities    (M21.969,  R20.8) Loss of protective sensation of skin of deformed foot    (E11.9) Diabetes mellitus type 2, noninsulin dependent (H)    (E11.42) Diabetic polyneuropathy associated with type 2 diabetes mellitus (H)    (I83.11,  I83.12) Varicose veins of both lower extremities with inflammation      PLAN:  -Patient evaluated and examined. Treatment options discussed with no educational barriers noted.    -Diabetic Foot Education provided. This included checking the feet daily looking for new new blisters or wounds, wearing shoes at all times when walking including around the house, and avoiding lotion application between the toes. Any sign of infection in the foot warrant's the patient presenting to the ED as soon as possible.    -Nails debrided x 10 without incident    -Debrided wound on LEFT distal hallux with a sharp tissue nipper to subcutaneous tissue (<20 cm squared)  ---Dressed wound with triple antibiotic ointment, gauze and a bandage  ---Continue dressing until healed  ---No severe erythema, no ascending erythema, no calor, no purulence, no malodor, no other SOI.     -Callus pared x 4 to RIGHT posterior heel, RIGHT posterior distal leg, RIGHT distal hallux and LEFT medial heel without incident  ---Patient reminded that the callus will likely return due to the underlying, prominent bone causing the callus while the patient is walking.  Calluses on the bottom surface of the foot will continue to come back due to the pressure from the bone on the bottom of your foot. Below are some tips for keeping the callus(es) trimmed which often decreases the pain on the bottom of your foot.    -Callus care:  ---Do a daily epsom salt soak in lukewarm water for 20 minutes.   ---After the soak, the apply a  moisturizing cream (ammonium lactate) to the callus and let it soak in for about ten minutes  ---Next, take an delaney board or nail file to or pumice stone the callus. This should be done daily (minimally lotion and callus paring) to keep the callus well pared.    -Ammonium Lactate Lotion  Ordered with five refills    -Orthotist referral for DM shoes and inserts with bilateral heel lifts for Achilles tendon pain  ---Patient has already been through a year of PT without pain relief. Will see if an add'l heel lift will decrase this pain    -Patient is encouraged to stay hydrated to decrease night leg cramps. If pain worsens, he is encouraged to discuss this with his PCP to see if it would be beneficial to modify his neuropathy medication dose.    -Patient in agreement with the above treatment plan and all of patient's questions were answered.      RTC 9 week for diabetic foot exam, high risk nail debridement and high risk callus paring        Mini Winters DPM

## 2020-03-04 NOTE — PROGRESS NOTES
Subjective     Aldo Castillo is a 59 year old male who presents to clinic today for the following health issues:    HPI     ESTABLISH CARE:       Diabetes Follow-up    How often are you checking your blood sugar? Two times daily - 190-210 fasting.    Blood sugar testing frequency justification:  Adjustment of medication(s)-  Would like to discuss the metformin extended release  What time of day are you checking your blood sugars (select all that apply)?  Before meals and After meals  Have you had any blood sugars above 200?  Yes   Have you had any blood sugars below 70?  No    What symptoms do you notice when your blood sugar is low?  None    What concerns do you have today about your diabetes? Other: discuss medication- metformin     Do you have any of these symptoms? (Select all that apply)  Numbness in feet    Have you had a diabetic eye exam in the last 12 months? No  He has been taking metformin 500mg twice daily.  He is on extended release and would like to change back to plain formulation.                Hyperlipidemia Follow-Up      Are you regularly taking any medication or supplement to lower your cholesterol?   Yes- lipitor    Are you having muscle aches or other side effects that you think could be caused by your cholesterol lowering medication?  No    Hypertension Follow-up      Do you check your blood pressure regularly outside of the clinic? No     Are you following a low salt diet? Yes    Are your blood pressures ever more than 140 on the top number (systolic) OR more   than 90 on the bottom number (diastolic), for example 140/90? No    BP Readings from Last 2 Encounters:   03/04/20 115/62   07/09/18 135/84     Hemoglobin A1C (%)   Date Value   09/21/2017 11.0 (A)     He has been having a lot of abdominal bloating, excess gas and heartburn.  Takes TUMS with some relief.      Patient Active Problem List   Diagnosis     Type 2 diabetes mellitus with hyperglycemia, without long-term current use  of insulin (H)     Degenerative joint disease (DJD) of hip     Morbid obesity (H)     Hyperlipidemia LDL goal <100     S/P total hip arthroplasty, right     Sciatic leg pain     Essential hypertension     Past Surgical History:   Procedure Laterality Date     ARTHROPLASTY HIP Right 2018    Procedure: ARTHROPLASTY HIP;  Right Total Hip Arthroplasty;  Surgeon: Dain Lawrence MD;  Location: [facility] OR     KNEE SURGERY Right        Social History     Tobacco Use     Smoking status: Former Smoker     Types: Cigars     Start date: 1978     Last attempt to quit: 2018     Years since quittin.7     Smokeless tobacco: Former User   Substance Use Topics     Alcohol use: No     Family History   Problem Relation Age of Onset     Cancer Mother      Diabetes No family hx of      Coronary Artery Disease No family hx of      Hypertension No family hx of      Hyperlipidemia No family hx of      Breast Cancer No family hx of      Colon Cancer No family hx of      Prostate Cancer No family hx of          Current Outpatient Medications   Medication Sig Dispense Refill     amitriptyline (ELAVIL) 50 MG tablet Take 50 mg by mouth daily       Blood Glucose Monitoring Suppl (BLOOD GLUCOSE MONITOR SYSTEM) W/DEVICE KIT 2 times daily        lisinopril-hydrochlorothiazide (ZESTORETIC) 20-12.5 MG tablet Take 1 tablet by mouth daily       metFORMIN (GLUCOPHAGE) 1000 MG tablet Take 1 tablet (1,000 mg) by mouth 2 times daily (with meals) 180 tablet 1     simvastatin (ZOCOR) 20 MG tablet Take 20 mg by mouth daily       vitamin D2 (ERGOCALCIFEROL) 91789 units (1250 mcg) capsule Take 50,000 Units by mouth once a week       No Known Allergies  Recent Labs   Lab Test 18  0654 17   A1C  --  11.0*   CR 0.78  --    GFRESTIMATED >90  --    GFRESTBLACK >90  --    POTASSIUM 4.4  --       BP Readings from Last 3 Encounters:   20 115/62   18 135/84   18 122/68    Wt Readings from Last 3 Encounters:   20  135.2 kg (298 lb)   07/09/18 124.3 kg (274 lb)   07/02/18 124.3 kg (274 lb)              Reviewed and updated as needed this visit by Provider         Review of Systems   ROS COMP: Constitutional, HEENT, cardiovascular, pulmonary, gi and gu systems are negative, except as otherwise noted.      Objective    /62 (BP Location: Left arm, Patient Position: Chair, Cuff Size: Adult Large)   Pulse 109   Temp 97.2  F (36.2  C) (Tympanic)   Ht 1.829 m (6')   Wt 135.2 kg (298 lb)   SpO2 96%   BMI 40.42 kg/m    Body mass index is 40.42 kg/m .  Physical Exam   GENERAL: healthy, alert and no distress  NECK: no adenopathy, no asymmetry, masses, or scars and thyroid normal to palpation  RESP: lungs clear to auscultation - no rales, rhonchi or wheezes  CV: regular rate and rhythm, normal S1 S2, no S3 or S4, no murmur, click or rub, no peripheral edema and peripheral pulses strong  PSYCH: mentation appears normal, affect normal/bright  Diabetic foot exam: decreased sensation throughout, toenails are thick and overgrown.  There is a deep callous and split in skin of left great toe.              Assessment & Plan     1. Type 2 diabetes mellitus with hyperglycemia, without long-term current use of insulin (H)  - Hemoglobin A1c  - Albumin Random Urine Quantitative with Creat Ratio  - C FOOT EXAM  NO CHARGE  - metFORMIN (GLUCOPHAGE) 1000 MG tablet; Take 1 tablet (1,000 mg) by mouth 2 times daily (with meals)  Dispense: 180 tablet; Refill: 1    2. Hyperlipidemia LDL goal <100  - Lipid Profile    3. Essential hypertension  - Comprehensive metabolic panel  - TSH with free T4 reflex  - CBC with platelets and differential    4. On statin therapy  - Comprehensive metabolic panel    5. Hypertrophic toenail  Dr Liao - appt today.    - PODIATRY/FOOT & ANKLE SURGERY REFERRAL    6. Callus of foot  As above  - PODIATRY/FOOT & ANKLE SURGERY REFERRAL    7. Gastroesophageal reflux disease, esophagitis presence not specified  start  -  omeprazole (PRILOSEC) 40 MG DR capsule; Take 1 capsule (40 mg) by mouth daily  Dispense: 90 capsule; Refill: 1       BMI:   Estimated body mass index is 40.42 kg/m  as calculated from the following:    Height as of this encounter: 1.829 m (6').    Weight as of this encounter: 135.2 kg (298 lb).       Return in about 3 months (around 6/4/2020) for diabetes, lipids, HTN.    Viviane Dye NP  [facility]

## 2020-03-04 NOTE — PATIENT INSTRUCTIONS
-Diabetic Foot Education:  ---Check the feet daily looking for new new blisters or wounds  ---Wear shoes at all times when walking including around the house  ---Avoid lotion application between the toes.   ---If you have any open wounds with signs of infection (redness, swelling, pain, purulence, fever, chills, nausea, vomiting), go to the Emergency Department as soon as possible.    Calluses on the bottom surface of the foot will continue to come back due to the pressure from the bone on the bottom of your foot. Below are some tips for keeping the callus(es) trimmed which often decreases the pain on the bottom of your foot.    -Callus care:  ---Do a daily epsom salt soak in lukewarm water for 20 minutes.   ---After the soak, the apply a moisturizing cream (ammonium lactate) to the callus and let it soak in for about ten minutes  ---Next, take an delaney board or nail file to or pumice stone the callus. This should be done daily (minimally lotion and callus paring) to keep the callus well pared.    -Ammonium Lactate will be ordered through your pharmacy today  -Orthotics / shoe inserts can be modified to take the pressure off the calluses which can decrease the rate a which they develop. You will be called within two weeks to schedule this appointment, or you can call the number on the card to schedule the appointment.

## 2020-03-04 NOTE — LETTER
3/4/2020        RE: Aldo Swansongalindo  [address] [place] [address]        Chief complaint: Patient presents with:  Toenail: Trimming        History of Present Illness: This 59 year old NIDDM II male is seen today at the request of his PCP, Moisés Dye, while he was following up for his PCP appointment for evaluation and suggestions of management of diabetic foot care and bilateral Achilles tendon pain.    Patient is unable to reach his feet to trim his nails or par his calluses. He has been unable to reach his feet to care of them in a long time. He also says his oldest son  from a drug overdose some time ago, so he was not taking care of himself. He is now back to seeing his PCP and managing his health.    He has had bilateral Achilles tendon pain for a long time. He says two previous doctors confirmed that 2/3 of his LEFT Achilles tendon is torn. It has been like this for a long time. He went through PT for a year and it did not help. He still has pain going down stairs.    He also complains of bilateral calf cramping in the middle of the night and tingling, burning and numbness in the bilateral feet. No further pedal complaints today.     Last HbA1C was 11.0% on 2017 and patient said it was 10.5% in  at the [facility]. He had labs today on 2020 to check his A1C again.         /62   Pulse 109   Temp 97.2  F (36.2  C) (Tympanic)   SpO2 96%     Patient Active Problem List   Diagnosis     Type 2 diabetes mellitus with hyperglycemia, without long-term current use of insulin (H)     Degenerative joint disease (DJD) of hip     Morbid obesity (H)     Hyperlipidemia LDL goal <100     S/P total hip arthroplasty, right     Sciatic leg pain     Essential hypertension       Past Surgical History:   Procedure Laterality Date     ARTHROPLASTY HIP Right 2018    Procedure: ARTHROPLASTY HIP;  Right Total Hip Arthroplasty;  Surgeon: Dain Lawrence MD;   Location: [facility] OR     KNEE SURGERY Right        Current Outpatient Medications   Medication     amitriptyline (ELAVIL) 50 MG tablet     Blood Glucose Monitoring Suppl (BLOOD GLUCOSE MONITOR SYSTEM) W/DEVICE KIT     lisinopril-hydrochlorothiazide (ZESTORETIC) 20-12.5 MG tablet     metFORMIN (GLUCOPHAGE) 1000 MG tablet     omeprazole (PRILOSEC) 40 MG DR capsule     simvastatin (ZOCOR) 20 MG tablet     vitamin D2 (ERGOCALCIFEROL) 09995 units (1250 mcg) capsule     No current facility-administered medications for this visit.         No Known Allergies    Family History   Problem Relation Age of Onset     Cancer Mother      Diabetes No family hx of      Coronary Artery Disease No family hx of      Hypertension No family hx of      Hyperlipidemia No family hx of      Breast Cancer No family hx of      Colon Cancer No family hx of      Prostate Cancer No family hx of        Social History     Socioeconomic History     Marital status:      Spouse name: None     Number of children: None     Years of education: None     Highest education level: None   Occupational History     None   Social Needs     Financial resource strain: None     Food insecurity:     Worry: None     Inability: None     Transportation needs:     Medical: None     Non-medical: None   Tobacco Use     Smoking status: Former Smoker     Types: Cigars     Start date: 1978     Last attempt to quit: 2018     Years since quittin.7     Smokeless tobacco: Former User   Substance and Sexual Activity     Alcohol use: No     Drug use: No     Sexual activity: None   Lifestyle     Physical activity:     Days per week: None     Minutes per session: None     Stress: None   Relationships     Social connections:     Talks on phone: None     Gets together: None     Attends Sabianist service: None     Active member of club or organization: None     Attends meetings of clubs or organizations: None     Relationship status: None     Intimate partner violence:      Fear of current or ex partner: None     Emotionally abused: None     Physically abused: None     Forced sexual activity: None   Other Topics Concern     Parent/sibling w/ CABG, MI or angioplasty before 65F 55M? Not Asked   Social History Narrative     None       ROS: 10 point ROS neg other than the symptoms noted above in the HPI.  EXAM  Constitutional: healthy, alert and no distress    Psychiatric: mentation appears normal and affect normal/bright    VASCULAR:  -Dorsalis pedis pulse +2/4 b/l  -Posterior tibial pulse +1/4 b/l  -Capillary refill time < 3 seconds to b/l hallux  -Hair growth Absent to b/l anterior legs and ankles  -Varicosities to bilateral feet  -Mild 1+ pitting edema to bilateral feet and ankles    NEURO:  -Protective sensation diminished with SWM +1/10 RIGHT and +4/10 LEFT on 03/04/2020  -Light touch sensation intact to b/l plantar forefoot  DERM:  Wound Location:  LEFT distal hallux  03/04/2020  Measurement:  Estimated to measure 0.1cm x 1.0cm x 0.1cm through the skin only  Drainage:  Minimal serous  Odor:  None  Undermining:  None post debridement  Edges:  Significant hyperkeratotic lesion   Base:  100% viable through skin only  Surrounding Skin: Intact but dry and hyperkeratotic skin    -Skin temperature cool to bilateral feet  -Skin texture excessively thickened and atrophic to bilateral feet  -Hyperkeratotic lesion to RIGHT posterior heel, RIGHT distal posterior leg, LEFT heel and distal RIGHT bilateral hallux with no wounds post paring  -Toenails elongated, thickened, dystrophic and discolored x 10  MSK:  -Pain on palpation to RIGHT posterior heel at Achilles tendon insertion with direct palpation and with side-to-side compression  -Pain on palpation to LEFT posterior Achilles tendon approximately 4cm proximal to insertion with side-to-side squeezing of tendon  -Atrophy of LEFT calf compared to RIGHT calf  -Muscle strength of ankles +5/5 for dorsiflexion, plantarflexion, ABDUction and  ADDuction b/l with pain against resistance to plantarflexion bilaterally       ============================================================    ASSESSMENT:  (L60.3) Onychodystrophy  (primary encounter diagnosis)    (L84) Callus of foot    (M67.88) Achilles tendinosis of both lower extremities    (M21.969,  R20.8) Loss of protective sensation of skin of deformed foot    (E11.9) Diabetes mellitus type 2, noninsulin dependent (H)    (E11.42) Diabetic polyneuropathy associated with type 2 diabetes mellitus (H)    (I83.11,  I83.12) Varicose veins of both lower extremities with inflammation      PLAN:  -Patient evaluated and examined. Treatment options discussed with no educational barriers noted.    -Diabetic Foot Education provided. This included checking the feet daily looking for new new blisters or wounds, wearing shoes at all times when walking including around the house, and avoiding lotion application between the toes. Any sign of infection in the foot warrant's the patient presenting to the ED as soon as possible.    -Nails debrided x 10 without incident    -Debrided wound on LEFT distal hallux with a sharp tissue nipper to subcutaneous tissue (<20 cm squared)  ---Dressed wound with triple antibiotic ointment, gauze and a bandage  ---Continue dressing until healed  ---No severe erythema, no ascending erythema, no calor, no purulence, no malodor, no other SOI.     -Callus pared x 4 to RIGHT posterior heel, RIGHT posterior distal leg, RIGHT distal hallux and LEFT medial heel without incident  ---Patient reminded that the callus will likely return due to the underlying, prominent bone causing the callus while the patient is walking.  Calluses on the bottom surface of the foot will continue to come back due to the pressure from the bone on the bottom of your foot. Below are some tips for keeping the callus(es) trimmed which often decreases the pain on the bottom of your foot.    -Callus care:  ---Do a daily epsom salt  soak in lukewarm water for 20 minutes.   ---After the soak, the apply a moisturizing cream (ammonium lactate) to the callus and let it soak in for about ten minutes  ---Next, take an delaney board or nail file to or pumice stone the callus. This should be done daily (minimally lotion and callus paring) to keep the callus well pared.    -Ammonium Lactate Lotion  Ordered with five refills    -Orthotist referral for DM shoes and inserts with bilateral heel lifts for Achilles tendon pain  ---Patient has already been through a year of PT without pain relief. Will see if an add'l heel lift will decrase this pain    -Patient is encouraged to stay hydrated to decrease night leg cramps. If pain worsens, he is encouraged to discuss this with his PCP to see if it would be beneficial to modify his neuropathy medication dose.    -Patient in agreement with the above treatment plan and all of patient's questions were answered.      RTC 9 week for diabetic foot exam, high risk nail debridement and high risk callus paring        Mini Winters DPM      Sincerely,        Mini Winters DPM

## 2020-03-04 NOTE — NURSING NOTE
Chief Complaint   Patient presents with     Toenail     Trimming       Initial /62   Pulse 109   Temp 97.2  F (36.2  C) (Tympanic)   SpO2 96%  Estimated body mass index is 40.42 kg/m  as calculated from the following:    Height as of an earlier encounter on 3/4/20: 1.829 m (6').    Weight as of an earlier encounter on 3/4/20: 135.2 kg (298 lb).  Medication Reconciliation: complete  Anahi Gracia

## 2020-03-05 LAB
ALBUMIN SERPL-MCNC: 3.8 G/DL (ref 3.4–5)
ALP SERPL-CCNC: 78 U/L (ref 40–150)
ALT SERPL W P-5'-P-CCNC: 28 U/L (ref 0–70)
ANION GAP SERPL CALCULATED.3IONS-SCNC: 12 MMOL/L (ref 3–14)
AST SERPL W P-5'-P-CCNC: 12 U/L (ref 0–45)
BILIRUB SERPL-MCNC: 0.3 MG/DL (ref 0.2–1.3)
BUN SERPL-MCNC: 31 MG/DL (ref 7–30)
CALCIUM SERPL-MCNC: 9.5 MG/DL (ref 8.5–10.1)
CHLORIDE SERPL-SCNC: 101 MMOL/L (ref 94–109)
CHOLEST SERPL-MCNC: 166 MG/DL
CO2 SERPL-SCNC: 21 MMOL/L (ref 20–32)
CREAT SERPL-MCNC: 1.05 MG/DL (ref 0.66–1.25)
CREAT UR-MCNC: 124 MG/DL
EST. AVERAGE GLUCOSE BLD GHB EST-MCNC: 258 MG/DL
GFR SERPL CREATININE-BSD FRML MDRD: 77 ML/MIN/{1.73_M2}
GLUCOSE SERPL-MCNC: 297 MG/DL (ref 70–99)
HBA1C MFR BLD: 10.6 % (ref 0–5.6)
HDLC SERPL-MCNC: 37 MG/DL
LDLC SERPL CALC-MCNC: 52 MG/DL
MICROALBUMIN UR-MCNC: 343 MG/L
MICROALBUMIN/CREAT UR: 276.61 MG/G CR (ref 0–17)
NONHDLC SERPL-MCNC: 129 MG/DL
POTASSIUM SERPL-SCNC: 5.1 MMOL/L (ref 3.4–5.3)
PROT SERPL-MCNC: 7.8 G/DL (ref 6.8–8.8)
SODIUM SERPL-SCNC: 134 MMOL/L (ref 133–144)
T4 FREE SERPL-MCNC: 0.93 NG/DL (ref 0.76–1.46)
TRIGL SERPL-MCNC: 385 MG/DL
TSH SERPL DL<=0.005 MIU/L-ACNC: 4.33 MU/L (ref 0.4–4)

## 2020-06-04 ENCOUNTER — VIRTUAL VISIT (OUTPATIENT)
Dept: FAMILY MEDICINE | Facility: OTHER | Age: 60
End: 2020-06-04
Attending: NURSE PRACTITIONER
Payer: MEDICARE

## 2020-06-04 DIAGNOSIS — Z79.899 ON STATIN THERAPY: ICD-10-CM

## 2020-06-04 DIAGNOSIS — E78.5 HYPERLIPIDEMIA LDL GOAL <100: Primary | ICD-10-CM

## 2020-06-04 DIAGNOSIS — B35.1 ONYCHOMYCOSIS: ICD-10-CM

## 2020-06-04 DIAGNOSIS — I10 ESSENTIAL HYPERTENSION: ICD-10-CM

## 2020-06-04 DIAGNOSIS — E11.65 TYPE 2 DIABETES MELLITUS WITH HYPERGLYCEMIA, WITHOUT LONG-TERM CURRENT USE OF INSULIN (H): ICD-10-CM

## 2020-06-04 PROCEDURE — 99443 ZZC PHYSICIAN TELEPHONE EVALUATION 21-30 MIN: CPT | Mod: 95 | Performed by: NURSE PRACTITIONER

## 2020-06-04 RX ORDER — SIMVASTATIN 20 MG
20 TABLET ORAL DAILY
Qty: 90 TABLET | Refills: 1 | Status: SHIPPED | OUTPATIENT
Start: 2020-06-04

## 2020-06-04 RX ORDER — LISINOPRIL AND HYDROCHLOROTHIAZIDE 12.5; 2 MG/1; MG/1
1 TABLET ORAL DAILY
Qty: 90 TABLET | Refills: 1 | Status: SHIPPED | OUTPATIENT
Start: 2020-06-04

## 2020-06-04 NOTE — NURSING NOTE
Chief Complaint   Patient presents with     Diabetes     Hypertension     Lipids       Initial There were no vitals taken for this visit. Estimated body mass index is 40.42 kg/m  as calculated from the following:    Height as of 3/4/20: 1.829 m (6').    Weight as of 3/4/20: 135.2 kg (298 lb).  Medication Reconciliation: complete  Laura Ortega LPN

## 2020-06-04 NOTE — PROGRESS NOTES
"Aldo Castillo is a 60 year old male who is being evaluated via a billable telephone visit.      The patient has been notified of following:     \"This telephone visit will be conducted via a call between you and your physician/provider. We have found that certain health care needs can be provided without the need for a physical exam.  This service lets us provide the care you need with a short phone conversation.  If a prescription is necessary we can send it directly to your pharmacy.  If lab work is needed we can place an order for that and you can then stop by our lab to have the test done at a later time.    Telephone visits are billed at different rates depending on your insurance coverage. During this emergency period, for some insurers they may be billed the same as an in-person visit.  Please reach out to your insurance provider with any questions.    If during the course of the call the physician/provider feels a telephone visit is not appropriate, you will not be charged for this service.\"    Patient has given verbal consent for Telephone visit?  Yes    What phone number would you like to be contacted at? [phone]    How would you like to obtain your AVS? Mail a copy    Subjective     Aldo Castillo is a 60 year old male who presents via phone visit today for the following health issues:    HPI  Diabetes Follow-up    How often are you checking your blood sugar? Two times daily - fasting 115-125; 2 hours after supper:  180-210  Blood sugar testing frequency justification:  Adjustment of medication(s)  What time of day are you checking your blood sugars (select all that apply)?  Before meals and After meals  Have you had any blood sugars above 200?  Yes evening  Have you had any blood sugars below 70?  No    What symptoms do you notice when your blood sugar is low?  None    What concerns do you have today about your diabetes? None and Blood sugar is often over 200     Do you have any of these " symptoms? (Select all that apply)  Numbness in feet and Redness, sores, or blisters on feet    Have you had a diabetic eye exam in the last 12 months? No     Is not taking januvia due to cost.      Has changed diet, lost a bit of weight.  He is feeling well.              Hyperlipidemia Follow-Up      Are you regularly taking any medication or supplement to lower your cholesterol?   Yes- zocor    Are you having muscle aches or other side effects that you think could be caused by your cholesterol lowering medication?  No    Hypertension Follow-up      Do you check your blood pressure regularly outside of the clinic? No     Are you following a low salt diet? Yes    Are your blood pressures ever more than 140 on the top number (systolic) OR more   than 90 on the bottom number (diastolic), for example 140/90? No    BP Readings from Last 2 Encounters:   03/04/20 115/62   03/04/20 115/62     Hemoglobin A1C (%)   Date Value   03/04/2020 10.6 (H)   09/21/2017 11.0 (A)     LDL Cholesterol Calculated (mg/dL)   Date Value   03/04/2020 52         How many servings of fruits and vegetables do you eat daily?  2-3    On average, how many sweetened beverages do you drink each day (Examples: soda, juice, sweet tea, etc.  Do NOT count diet or artificially sweetened beverages)?   0    How many days per week do you exercise enough to make your heart beat faster? 7    How many minutes a day do you exercise enough to make your heart beat faster? 30 - 60    How many days per week do you miss taking your medication? 0    Needs follow-up with Dr Liao - he was to have an appt but it was cancelled due to COVID restrictions, he would like the appt to be rescheduled.          Patient Active Problem List   Diagnosis     Type 2 diabetes mellitus with hyperglycemia, without long-term current use of insulin (H)     Degenerative joint disease (DJD) of hip     Morbid obesity (H)     Hyperlipidemia LDL goal <100     S/P total hip arthroplasty, right      Sciatic leg pain     Essential hypertension     Past Surgical History:   Procedure Laterality Date     ARTHROPLASTY HIP Right 2018    Procedure: ARTHROPLASTY HIP;  Right Total Hip Arthroplasty;  Surgeon: Dain Lawrence MD;  Location: [facility] OR     KNEE SURGERY Right        Social History     Tobacco Use     Smoking status: Former Smoker     Types: Cigars     Start date: 1978     Last attempt to quit: 2018     Years since quittin.9     Smokeless tobacco: Former User   Substance Use Topics     Alcohol use: No     Family History   Problem Relation Age of Onset     Cancer Mother      Diabetes No family hx of      Coronary Artery Disease No family hx of      Hypertension No family hx of      Hyperlipidemia No family hx of      Breast Cancer No family hx of      Colon Cancer No family hx of      Prostate Cancer No family hx of          Current Outpatient Medications   Medication Sig Dispense Refill     ammonium lactate (LAC-HYDRIN) 12 % external lotion Apply topically 2 times daily 567 g 4     Blood Glucose Monitoring Suppl (BLOOD GLUCOSE MONITOR SYSTEM) W/DEVICE KIT 2 times daily        lisinopril-hydrochlorothiazide (ZESTORETIC) 20-12.5 MG tablet Take 1 tablet by mouth daily 90 tablet 1     metFORMIN (GLUCOPHAGE) 1000 MG tablet Take 1 tablet (1,000 mg) by mouth 2 times daily (with meals) 180 tablet 1     omeprazole (PRILOSEC) 40 MG DR capsule Take 1 capsule (40 mg) by mouth daily 90 capsule 1     simvastatin (ZOCOR) 20 MG tablet Take 1 tablet (20 mg) by mouth daily 90 tablet 1     vitamin D2 (ERGOCALCIFEROL) 66337 units (1250 mcg) capsule Take 50,000 Units by mouth once a week       amitriptyline (ELAVIL) 50 MG tablet Take 50 mg by mouth daily       sitagliptin (JANUVIA) 100 MG tablet Take 1 tablet (100 mg) by mouth daily (Patient not taking: Reported on 2020) 90 tablet 1     No Known Allergies  Recent Labs   Lab Test 20  1554 18  0654 17   A1C 10.6*  --  11.0*   LDL 52   --   --    HDL 37*  --   --    TRIG 385*  --   --    ALT 28  --   --    CR 1.05 0.78  --    GFRESTIMATED 77 >90  --    GFRESTBLACK 90 >90  --    POTASSIUM 5.1 4.4  --    TSH 4.33*  --   --       BP Readings from Last 3 Encounters:   03/04/20 115/62   03/04/20 115/62   07/09/18 135/84    Wt Readings from Last 3 Encounters:   03/04/20 135.2 kg (298 lb)   07/09/18 124.3 kg (274 lb)   07/02/18 124.3 kg (274 lb)                    Reviewed and updated as needed this visit by Provider         Review of Systems   Constitutional, HEENT, cardiovascular, pulmonary, gi and gu systems are negative, except as otherwise noted.       Objective   Reported vitals:  There were no vitals taken for this visit.   alert and no distress  PSYCH: Alert and oriented times 3; coherent speech, normal   rate and volume, able to articulate logical thoughts, able   to abstract reason, no tangential thoughts, no hallucinations   or delusions  His affect is normal and pleasant  RESP: No cough, no audible wheezing, able to talk in full sentences  Remainder of exam unable to be completed due to telephone visits            Assessment/Plan:  1. Type 2 diabetes mellitus with hyperglycemia, without long-term current use of insulin (H)  - metFORMIN (GLUCOPHAGE) 1000 MG tablet; Take 1 tablet (1,000 mg) by mouth 2 times daily (with meals)  Dispense: 180 tablet; Refill: 1  - Hemoglobin A1c; Future    2. Hyperlipidemia LDL goal <100  - simvastatin (ZOCOR) 20 MG tablet; Take 1 tablet (20 mg) by mouth daily  Dispense: 90 tablet; Refill: 1  - Lipid Profile; Future    3. Essential hypertension  - lisinopril-hydrochlorothiazide (ZESTORETIC) 20-12.5 MG tablet; Take 1 tablet by mouth daily  Dispense: 90 tablet; Refill: 1  - Comprehensive metabolic panel; Future  - TSH with free T4 reflex; Future    4. On statin therapy  - Comprehensive metabolic panel; Future    5. Onychomycosis  - Orthopedic Adult Referral; Future    Return in about 3 months (around 9/4/2020) for  diabetes, lipids, HTN.      Phone call duration:  22 minutes    Viviane Dye,   Certified Adult Nurse Practitioner  [phone]

## 2020-06-08 NOTE — PATIENT INSTRUCTIONS
Assessment/Plan:  1. Type 2 diabetes mellitus with hyperglycemia, without long-term current use of insulin (H)  - metFORMIN (GLUCOPHAGE) 1000 MG tablet; Take 1 tablet (1,000 mg) by mouth 2 times daily (with meals)  Dispense: 180 tablet; Refill: 1  - Hemoglobin A1c; Future    2. Hyperlipidemia LDL goal <100  - simvastatin (ZOCOR) 20 MG tablet; Take 1 tablet (20 mg) by mouth daily  Dispense: 90 tablet; Refill: 1  - Lipid Profile; Future    3. Essential hypertension  - lisinopril-hydrochlorothiazide (ZESTORETIC) 20-12.5 MG tablet; Take 1 tablet by mouth daily  Dispense: 90 tablet; Refill: 1  - Comprehensive metabolic panel; Future  - TSH with free T4 reflex; Future    4. On statin therapy  - Comprehensive metabolic panel; Future    5. Onychomycosis  - Orthopedic Adult Referral; Future    Return in about 3 months (around 9/4/2020) for diabetes, lipids, HTN.      Phone call duration:  22 minutes    Viviane Dye,   Certified Adult Nurse Practitioner  [phone]

## 2020-06-24 ENCOUNTER — OFFICE VISIT (OUTPATIENT)
Dept: PODIATRY | Facility: OTHER | Age: 60
End: 2020-06-24
Attending: NURSE PRACTITIONER
Payer: MEDICARE

## 2020-06-24 VITALS
TEMPERATURE: 96.5 F | HEART RATE: 98 BPM | RESPIRATION RATE: 16 BRPM | SYSTOLIC BLOOD PRESSURE: 128 MMHG | DIASTOLIC BLOOD PRESSURE: 80 MMHG | OXYGEN SATURATION: 94 %

## 2020-06-24 DIAGNOSIS — I83.12 VARICOSE VEINS OF BOTH LOWER EXTREMITIES WITH INFLAMMATION: ICD-10-CM

## 2020-06-24 DIAGNOSIS — M21.969 LOSS OF PROTECTIVE SENSATION OF SKIN OF DEFORMED FOOT: ICD-10-CM

## 2020-06-24 DIAGNOSIS — E11.9 DIABETES MELLITUS TYPE 2, NONINSULIN DEPENDENT (H): ICD-10-CM

## 2020-06-24 DIAGNOSIS — M67.88 ACHILLES TENDINOSIS OF BOTH LOWER EXTREMITIES: ICD-10-CM

## 2020-06-24 DIAGNOSIS — L60.3 ONYCHODYSTROPHY: Primary | ICD-10-CM

## 2020-06-24 DIAGNOSIS — B35.1 ONYCHOMYCOSIS: ICD-10-CM

## 2020-06-24 DIAGNOSIS — R20.8 LOSS OF PROTECTIVE SENSATION OF SKIN OF DEFORMED FOOT: ICD-10-CM

## 2020-06-24 DIAGNOSIS — E11.42 DIABETIC POLYNEUROPATHY ASSOCIATED WITH TYPE 2 DIABETES MELLITUS (H): ICD-10-CM

## 2020-06-24 DIAGNOSIS — L84 CALLUS OF FOOT: ICD-10-CM

## 2020-06-24 DIAGNOSIS — I83.11 VARICOSE VEINS OF BOTH LOWER EXTREMITIES WITH INFLAMMATION: ICD-10-CM

## 2020-06-24 PROCEDURE — G0463 HOSPITAL OUTPT CLINIC VISIT: HCPCS | Mod: 25

## 2020-06-24 PROCEDURE — 11056 PARNG/CUTG B9 HYPRKR LES 2-4: CPT | Performed by: PODIATRIST

## 2020-06-24 PROCEDURE — 99213 OFFICE O/P EST LOW 20 MIN: CPT | Mod: 25 | Performed by: PODIATRIST

## 2020-06-24 PROCEDURE — 11721 DEBRIDE NAIL 6 OR MORE: CPT | Performed by: PODIATRIST

## 2020-06-24 PROCEDURE — G0463 HOSPITAL OUTPT CLINIC VISIT: HCPCS

## 2020-06-24 RX ORDER — AMMONIUM LACTATE 12 G/100G
CREAM TOPICAL 2 TIMES DAILY
Qty: 385 G | Refills: 5 | Status: SHIPPED | OUTPATIENT
Start: 2020-06-24 | End: 2020-11-20

## 2020-06-24 ASSESSMENT — PAIN SCALES - GENERAL: PAINLEVEL: NO PAIN (0)

## 2020-06-24 NOTE — PATIENT INSTRUCTIONS
Thank you for allowing  and our podiatry team to participate in your care. Please call our office at [phone] with scheduling questions or with any other questions or concerns.

## 2020-06-24 NOTE — PROGRESS NOTES
Chief complaint: Patient presents with:  Toenail: trimming        History of Present Illness: This 60 year old NIDDM II male is seen today for follow-up management of diabetic foot care and bilateral Achilles tendon pain.    He had an appointment scheduled to be fit for DM shoes, but it was canceled due to COVID 19 and he has an appointment rescheduled for July, 2020.    He was prescribed Ammonium Lactate and he was using it twice a day and it seemed to work well, but he ran out. He would like another prescription ordered today because he is not sure about the refills going through at the last appointment.    He still has LEFT Achilles tendon pain and the RIGHT one was also  Hurting. The pain is improved today, but he still has some tenderness. He has been working on managing his blood sugars and this has decreased his pain.    He is rarely getting the cramps in his legs in the middle of the night so this is improved.    He still has numbness in his feet and it depends on his blood sugars.    Last HbA1C was down to 10.6% to 03/04/2020 from 11.0% on 09/21/2017 and patient said it was 10.5% in January, 2020 at the [facility]. He had labs today on 03/04/2020 to check his A1C again.     No further pedal complaints today.     He is smoking about two cigars a week and he is not interested in week.        /80 (BP Location: Left arm, Patient Position: Sitting, Cuff Size: Adult Large)   Pulse 98   Temp 96.5  F (35.8  C) (Tympanic)   Resp 16   SpO2 94%     Patient Active Problem List   Diagnosis     Type 2 diabetes mellitus with hyperglycemia, without long-term current use of insulin (H)     Degenerative joint disease (DJD) of hip     Morbid obesity (H)     Hyperlipidemia LDL goal <100     S/P total hip arthroplasty, right     Sciatic leg pain     Essential hypertension       Past Surgical History:   Procedure Laterality Date     ARTHROPLASTY HIP Right 6/21/2018    Procedure: ARTHROPLASTY HIP;  Right Total  Hip Arthroplasty;  Surgeon: Dain Lawrence MD;  Location: [facility] OR     KNEE SURGERY Right        Current Outpatient Medications   Medication     amitriptyline (ELAVIL) 50 MG tablet     ammonium lactate (LAC-HYDRIN) 12 % external lotion     Blood Glucose Monitoring Suppl (BLOOD GLUCOSE MONITOR SYSTEM) W/DEVICE KIT     lisinopril-hydrochlorothiazide (ZESTORETIC) 20-12.5 MG tablet     metFORMIN (GLUCOPHAGE) 1000 MG tablet     omeprazole (PRILOSEC) 40 MG DR capsule     simvastatin (ZOCOR) 20 MG tablet     sitagliptin (JANUVIA) 100 MG tablet     vitamin D2 (ERGOCALCIFEROL) 16719 units (1250 mcg) capsule     No current facility-administered medications for this visit.         No Known Allergies    Family History   Problem Relation Age of Onset     Cancer Mother      Diabetes No family hx of      Coronary Artery Disease No family hx of      Hypertension No family hx of      Hyperlipidemia No family hx of      Breast Cancer No family hx of      Colon Cancer No family hx of      Prostate Cancer No family hx of        Social History     Socioeconomic History     Marital status:      Spouse name: None     Number of children: None     Years of education: None     Highest education level: None   Occupational History     None   Social Needs     Financial resource strain: None     Food insecurity:     Worry: None     Inability: None     Transportation needs:     Medical: None     Non-medical: None   Tobacco Use     Smoking status: Former Smoker     Types: Cigars     Start date: 1978     Last attempt to quit: 2018     Years since quittin.7     Smokeless tobacco: Former User   Substance and Sexual Activity     Alcohol use: No     Drug use: No     Sexual activity: None   Lifestyle     Physical activity:     Days per week: None     Minutes per session: None     Stress: None   Relationships     Social connections:     Talks on phone: None     Gets together: None     Attends Oriental orthodox service: None     Active  member of club or organization: None     Attends meetings of clubs or organizations: None     Relationship status: None     Intimate partner violence:     Fear of current or ex partner: None     Emotionally abused: None     Physically abused: None     Forced sexual activity: None   Other Topics Concern     Parent/sibling w/ CABG, MI or angioplasty before 65F 55M? Not Asked   Social History Narrative     None       ROS: 10 point ROS neg other than the symptoms noted above in the HPI.  EXAM  Constitutional: healthy, alert and no distress    Psychiatric: mentation appears normal and affect normal/bright    VASCULAR:  -Dorsalis pedis pulse +2/4 b/l  -Posterior tibial pulse +1/4 b/l  -Capillary refill time < 3 seconds to b/l hallux  -Hair growth Absent to b/l anterior legs and ankles  -Varicosities to bilateral feet  -Mild 1+ pitting edema to bilateral feet and ankles  NEURO:  -Protective sensation diminished with SWM +0/10 RIGHT and +0/10 LEFT on 06/24/2020  ---Previously +1/10 on RIGHT and +4/10 on LEFT on 03/04/2020  -Light touch sensation intact to b/l plantar forefoot  DERM:  -Skin temperature cool to bilateral feet  -Skin texture excessively thickened and atrophic to bilateral feet  -Hyperkeratotic lesion to LEFT heel and LEFT distal hallux with no wounds post paring  -Toenails elongated, thickened, dystrophic and discolored x 10    Wound Location:  LEFT distal hallux  06/24/2020: HEALED with no open wound    03/04/2020  Measurement:  Estimated to measure 0.1cm x 1.0cm x 0.1cm through the skin only  Drainage:  Minimal serous  Odor:  None  Undermining:  None post debridement  Edges:  Significant hyperkeratotic lesion   Base:  100% viable through skin only  Surrounding Skin: Intact but dry and hyperkeratotic skin  MSK:  -Atrophy of LEFT calf compared to RIGHT calf  -Muscle strength of ankles +5/5 for dorsiflexion, plantarflexion, ABDUction and ADDuction b/l with pain against resistance to plantarflexion bilaterally      ============================================================    ASSESSMENT:  (L60.3) Onychodystrophy  (primary encounter diagnosis)    (L84) Callus of foot    (M67.88) Achilles tendinosis of both lower extremities    (M21.969,  R20.8) Loss of protective sensation of skin of deformed foot    (E11.9) Diabetes mellitus type 2, noninsulin dependent (H)    (E11.42) Diabetic polyneuropathy associated with type 2 diabetes mellitus (H)    (I83.11,  I83.12) Varicose veins of both lower extremities with inflammation      PLAN:  -Patient evaluated and examined. Treatment options discussed with no educational barriers noted.    -Diabetic Foot Education provided. This included checking the feet daily looking for new new blisters or wounds, wearing shoes at all times when walking including around the house, and avoiding lotion application between the toes. Any sign of infection in the foot warrant's the patient presenting to the ED as soon as possible.    -Nails debrided x 10 without incident    -Callus pared x 2 to LEFT heel and LEFT hallux  ---Patient reminded that the callus will likely return due to the underlying, prominent bone causing the callus while the patient is walking.  Calluses on the bottom surface of the foot will continue to come back due to the pressure from the bone on the bottom of your foot. Below are some tips for keeping the callus(es) trimmed which often decreases the pain on the bottom of your foot.  --Continue using an delaney board or nail file to or pumice stone the callus. This should be done daily (minimally lotion and callus paring) to keep the callus well pared.    -Ammonium Lactate Lotion  Ordered with five refills again on 06/24/2020    -DM shoes: Patient originally had an appointment in April, 2020, but it was rescheduled for July, 2020, due to COVID 19.    -Night cramps in leg and bilateral Achilles tendon pain is much improved today.  -Diabetic Foot Education provided. This included checking  the feet daily looking for new new blisters or wounds, wearing shoes at all times when walking including around the house, and avoiding lotion application between the toes. Any sign of infection in the foot warrant's the patient presenting to the ED as soon as possible.    -Patient in agreement with the above treatment plan and all of patient's questions were answered.      RTC 3 months for diabetic foot exam, high risk nail debridement and high risk callus césar Winters DPM

## 2020-06-24 NOTE — NURSING NOTE
Chief Complaint   Patient presents with     Toenail     trimming       Initial /80 (BP Location: Left arm, Patient Position: Sitting, Cuff Size: Adult Large)   Pulse 98   Temp 96.5  F (35.8  C) (Tympanic)   Resp 16   SpO2 94%  Estimated body mass index is 40.42 kg/m  as calculated from the following:    Height as of 3/4/20: 1.829 m (6').    Weight as of 3/4/20: 135.2 kg (298 lb).  Medication Reconciliation: complete  Pearl Ceja LPN

## 2020-10-07 ENCOUNTER — OFFICE VISIT (OUTPATIENT)
Dept: PODIATRY | Facility: OTHER | Age: 60
End: 2020-10-07
Attending: PODIATRIST
Payer: MEDICARE

## 2020-10-07 VITALS
WEIGHT: 298 LBS | RESPIRATION RATE: 14 BRPM | TEMPERATURE: 98.5 F | OXYGEN SATURATION: 97 % | BODY MASS INDEX: 40.36 KG/M2 | HEIGHT: 72 IN | DIASTOLIC BLOOD PRESSURE: 80 MMHG | SYSTOLIC BLOOD PRESSURE: 122 MMHG | HEART RATE: 97 BPM

## 2020-10-07 DIAGNOSIS — R20.8 LOSS OF PROTECTIVE SENSATION OF SKIN OF DEFORMED FOOT: ICD-10-CM

## 2020-10-07 DIAGNOSIS — Z71.6 TOBACCO ABUSE COUNSELING: ICD-10-CM

## 2020-10-07 DIAGNOSIS — I83.11 VARICOSE VEINS OF BOTH LOWER EXTREMITIES WITH INFLAMMATION: ICD-10-CM

## 2020-10-07 DIAGNOSIS — M67.88 ACHILLES TENDINOSIS OF BOTH LOWER EXTREMITIES: ICD-10-CM

## 2020-10-07 DIAGNOSIS — L84 CALLUS OF FOOT: ICD-10-CM

## 2020-10-07 DIAGNOSIS — E11.9 DIABETES MELLITUS TYPE 2, NONINSULIN DEPENDENT (H): ICD-10-CM

## 2020-10-07 DIAGNOSIS — I83.12 VARICOSE VEINS OF BOTH LOWER EXTREMITIES WITH INFLAMMATION: ICD-10-CM

## 2020-10-07 DIAGNOSIS — E11.42 DIABETIC POLYNEUROPATHY ASSOCIATED WITH TYPE 2 DIABETES MELLITUS (H): ICD-10-CM

## 2020-10-07 DIAGNOSIS — M21.969 LOSS OF PROTECTIVE SENSATION OF SKIN OF DEFORMED FOOT: ICD-10-CM

## 2020-10-07 DIAGNOSIS — L60.3 ONYCHODYSTROPHY: Primary | ICD-10-CM

## 2020-10-07 DIAGNOSIS — F17.210 CIGARETTE NICOTINE DEPENDENCE WITHOUT COMPLICATION: ICD-10-CM

## 2020-10-07 PROCEDURE — 11721 DEBRIDE NAIL 6 OR MORE: CPT | Mod: 59 | Performed by: PODIATRIST

## 2020-10-07 PROCEDURE — 99213 OFFICE O/P EST LOW 20 MIN: CPT | Mod: 25 | Performed by: PODIATRIST

## 2020-10-07 PROCEDURE — G0463 HOSPITAL OUTPT CLINIC VISIT: HCPCS

## 2020-10-07 PROCEDURE — 11056 PARNG/CUTG B9 HYPRKR LES 2-4: CPT | Performed by: PODIATRIST

## 2020-10-07 RX ORDER — CEFPROZIL 500 MG/1
TABLET, FILM COATED ORAL
COMMUNITY
Start: 2020-10-05 | End: 2020-11-18

## 2020-10-07 ASSESSMENT — MIFFLIN-ST. JEOR: SCORE: 2204.72

## 2020-10-07 ASSESSMENT — PAIN SCALES - GENERAL: PAINLEVEL: NO PAIN (0)

## 2020-10-07 NOTE — NURSING NOTE
Chief Complaint   Patient presents with     Toenail     diabetic foot exam and callous'       Initial /80   Pulse 97   Temp 98.5  F (36.9  C) (Tympanic)   Resp 14   Ht 1.829 m (6')   Wt 135.2 kg (298 lb)   SpO2 97%   BMI 40.42 kg/m   Estimated body mass index is 40.42 kg/m  as calculated from the following:    Height as of this encounter: 1.829 m (6').    Weight as of this encounter: 135.2 kg (298 lb).  Medication Reconciliation: complete  Melinda Guajardo LPN

## 2020-10-07 NOTE — PROGRESS NOTES
"Chief complaint: Patient presents with:  Toenail: diabetic foot exam and callous'        History of Present Illness: This 60 year old NIDDM II male is seen today for follow-up management of diabetic foot care and bilateral Achilles tendon pain.    He was supposed to be seen for new DM shoes in August, 2020, but he says they keep canceling the appointment. He does not have anything currently scheduled.     He has no Achilles tendon pain, but he developed a RIGHT posterior heel bump that developed around August/September, 2020. The lump is not getting smaller, but it is not painful.    He has a lot of numbness in his feet. The numbness is better or worse on some days and he thinks it is correlated with what he eats.    He was prescribed Ammonium Lactate and he was using it twice a day and it is \"kind of working but not really.\" He says it helps a little bit and he has other creams he uses as well.    Last HbA1C was down to 10.6% to 03/04/2020 from 11.0% on 09/21/2017 and patient said it was 10.5% in January, 2020 at the [facility].  He is working on lowering this with a diet change.    No further pedal complaints today.     He is smoking about two cigars a week and he is not interested in week.        /80   Pulse 97   Temp 98.5  F (36.9  C) (Tympanic)   Resp 14   Ht 1.829 m (6')   Wt 135.2 kg (298 lb)   SpO2 97%   BMI 40.42 kg/m      Patient Active Problem List   Diagnosis     Type 2 diabetes mellitus with hyperglycemia, without long-term current use of insulin (H)     Degenerative joint disease (DJD) of hip     Morbid obesity (H)     Hyperlipidemia LDL goal <100     S/P total hip arthroplasty, right     Sciatic leg pain     Essential hypertension       Past Surgical History:   Procedure Laterality Date     ARTHROPLASTY HIP Right 6/21/2018    Procedure: ARTHROPLASTY HIP;  Right Total Hip Arthroplasty;  Surgeon: Dain Lawrence MD;  Location: [facility] OR     KNEE SURGERY Right        Current " Outpatient Medications   Medication     amitriptyline (ELAVIL) 50 MG tablet     ammonium lactate (AMLACTIN) 12 % external cream     ammonium lactate (LAC-HYDRIN) 12 % external lotion     Blood Glucose Monitoring Suppl (BLOOD GLUCOSE MONITOR SYSTEM) W/DEVICE KIT     cefPROZIL (CEFZIL) 500 MG tablet     lisinopril-hydrochlorothiazide (ZESTORETIC) 20-12.5 MG tablet     metFORMIN (GLUCOPHAGE) 1000 MG tablet     omeprazole (PRILOSEC) 40 MG DR capsule     simvastatin (ZOCOR) 20 MG tablet     sitagliptin (JANUVIA) 100 MG tablet     vitamin D2 (ERGOCALCIFEROL) 59607 units (1250 mcg) capsule     No current facility-administered medications for this visit.         No Known Allergies    Family History   Problem Relation Age of Onset     Cancer Mother      Diabetes No family hx of      Coronary Artery Disease No family hx of      Hypertension No family hx of      Hyperlipidemia No family hx of      Breast Cancer No family hx of      Colon Cancer No family hx of      Prostate Cancer No family hx of        Social History     Socioeconomic History     Marital status:      Spouse name: None     Number of children: None     Years of education: None     Highest education level: None   Occupational History     None   Social Needs     Financial resource strain: None     Food insecurity:     Worry: None     Inability: None     Transportation needs:     Medical: None     Non-medical: None   Tobacco Use     Smoking status: Former Smoker     Types: Cigars     Start date: 1978     Last attempt to quit: 2018     Years since quittin.7     Smokeless tobacco: Former User   Substance and Sexual Activity     Alcohol use: No     Drug use: No     Sexual activity: None   Lifestyle     Physical activity:     Days per week: None     Minutes per session: None     Stress: None   Relationships     Social connections:     Talks on phone: None     Gets together: None     Attends Buddhist service: None     Active member of club or  organization: None     Attends meetings of clubs or organizations: None     Relationship status: None     Intimate partner violence:     Fear of current or ex partner: None     Emotionally abused: None     Physically abused: None     Forced sexual activity: None   Other Topics Concern     Parent/sibling w/ CABG, MI or angioplasty before 65F 55M? Not Asked   Social History Narrative     None       ROS: 10 point ROS neg other than the symptoms noted above in the HPI.  EXAM  Constitutional: healthy, alert and no distress    Psychiatric: mentation appears normal and affect normal/bright    VASCULAR:  -Dorsalis pedis pulse +2/4 b/l  -Posterior tibial pulse +1/4 b/l  -Capillary refill time < 3 seconds to b/l hallux  -Hair growth Absent to b/l anterior legs and ankles  -Varicosities to bilateral feet  -Mild 1+ pitting edema to bilateral feet and ankles  NEURO:  -Protective sensation diminished with SWM +0/10 RIGHT and +1/10 LEFT on 10/07/2020  -Protective sensation diminished with SWM +0/10 RIGHT and +0/10 LEFT on 06/24/2020  ---Previously +1/10 on RIGHT and +4/10 on LEFT on 03/04/2020  -Light touch sensation intact to b/l plantar forefoot  DERM:  -Skin temperature cool to bilateral feet  -Skin texture excessively thickened and atrophic to bilateral feet  -Moderate-to-significant fissuring and hyperkeratotic lesion to LEFT heel and LEFT distal hallux with no wounds post paring  -Toenails elongated, thickened, dystrophic and discolored x 10    MSK:  -Atrophy of LEFT calf compared to RIGHT calf  -Soft tissue mass (estimated to measure 2.0cm x 1.5cm x +0.5cm) within the RIGHT Achilles tendon approximately 4-6cm proximal to the Achilles insertion  -Muscle strength of ankles +5/5 for dorsiflexion, plantarflexion, ABDUction and ADDuction b/l with pain against resistance to plantarflexion bilaterally     ============================================================    ASSESSMENT:  (L60.3) Onychodystrophy  (primary encounter  diagnosis)    (L84) Callus of foot    (M67.88) Achilles tendinosis of both lower extremities    (M21.969,  R20.8) Loss of protective sensation of skin of deformed foot    (E11.9) Diabetes mellitus type 2, noninsulin dependent (H)    (E11.42) Diabetic polyneuropathy associated with type 2 diabetes mellitus (H)    (I83.11,  I83.12) Varicose veins of both lower extremities with inflammation    (Z71.6) Tobacco abuse counseling    (F17.210) Cigarette nicotine dependence without complication        PLAN:  -Patient evaluated and examined. Treatment options discussed with no educational barriers noted.    -Diabetic Foot Education provided. This included checking the feet daily looking for new new blisters or wounds, wearing shoes at all times when walking including around the house, and avoiding lotion application between the toes. Any sign of infection in the foot warrant's the patient presenting to the ED as soon as possible.    -Nails debrided x 10 without incident    -Callus pared x 2 to LEFT heel and LEFT hallux  ---Patient reminded that the callus will likely return due to the underlying, prominent bone causing the callus while the patient is walking.  Calluses on the bottom surface of the foot will continue to come back due to the pressure from the bone on the bottom of your foot. Below are some tips for keeping the callus(es) trimmed which often decreases the pain on the bottom of your foot.  --Continue using an delaney board or nail file to or pumice stone the callus. This should be done daily (minimally lotion and callus paring) to keep the callus well pared.    -Ammonium Lactate Lotion  Ordered with five refills again on 06/24/2020    -DM shoes: Patient originally had an appointment in April, 2020, but it was rescheduled for July, 2020, due to COVID 19.    -Diabetic Foot Education provided. This included checking the feet daily looking for new new blisters or wounds, wearing shoes at all times when walking  including around the house, and avoiding lotion application between the toes. Any sign of infection in the foot warrant's the patient presenting to the ED as soon as possible.    -DM shoes: New referral placed for new shoes on 10/07/2020    -Patient in agreement with the above treatment plan and all of patient's questions were answered.      RTC 3 months for diabetic foot exam, high risk nail debridement and high risk callus césar Winters DPM

## 2020-11-18 ENCOUNTER — ANCILLARY PROCEDURE (OUTPATIENT)
Dept: GENERAL RADIOLOGY | Facility: OTHER | Age: 60
End: 2020-11-18
Attending: ORTHOPAEDIC SURGERY
Payer: MEDICARE

## 2020-11-18 ENCOUNTER — OFFICE VISIT (OUTPATIENT)
Dept: ORTHOPEDICS | Facility: OTHER | Age: 60
End: 2020-11-18
Attending: PHYSICIAN ASSISTANT
Payer: MEDICARE

## 2020-11-18 VITALS
HEART RATE: 104 BPM | WEIGHT: 273 LBS | TEMPERATURE: 97.1 F | BODY MASS INDEX: 37.03 KG/M2 | SYSTOLIC BLOOD PRESSURE: 118 MMHG | OXYGEN SATURATION: 98 % | DIASTOLIC BLOOD PRESSURE: 68 MMHG

## 2020-11-18 DIAGNOSIS — M79.641 CHRONIC PAIN OF RIGHT HAND: ICD-10-CM

## 2020-11-18 DIAGNOSIS — G89.29 CHRONIC PAIN OF RIGHT HAND: ICD-10-CM

## 2020-11-18 DIAGNOSIS — M79.641 CHRONIC PAIN OF RIGHT HAND: Primary | ICD-10-CM

## 2020-11-18 DIAGNOSIS — G89.29 CHRONIC PAIN OF RIGHT HAND: Primary | ICD-10-CM

## 2020-11-18 PROCEDURE — 73130 X-RAY EXAM OF HAND: CPT | Mod: TC,RT

## 2020-11-18 PROCEDURE — 99203 OFFICE O/P NEW LOW 30 MIN: CPT | Performed by: ORTHOPAEDIC SURGERY

## 2020-11-18 PROCEDURE — G0463 HOSPITAL OUTPT CLINIC VISIT: HCPCS

## 2020-11-18 RX ORDER — CLINDAMYCIN HCL 300 MG
300 CAPSULE ORAL 4 TIMES DAILY
Qty: 56 CAPSULE | Refills: 0 | Status: SHIPPED | OUTPATIENT
Start: 2020-11-18 | End: 2020-12-02

## 2020-11-18 ASSESSMENT — PAIN SCALES - GENERAL: PAINLEVEL: MILD PAIN (3)

## 2020-11-18 NOTE — NURSING NOTE
Chief Complaint   Patient presents with     Consult     injury to right pinkie finger       Initial /68 (BP Location: Right arm, Patient Position: Sitting, Cuff Size: Adult Large)   Pulse 104   Temp 97.1  F (36.2  C) (Tympanic)   Wt 123.8 kg (273 lb)   SpO2 98%   BMI 37.03 kg/m   Estimated body mass index is 37.03 kg/m  as calculated from the following:    Height as of 10/7/20: 1.829 m (6').    Weight as of this encounter: 123.8 kg (273 lb).  Medication Reconciliation: complete  ALYSON ABARCA LPN

## 2020-11-18 NOTE — PROGRESS NOTES
06/22/18 1000   Quick Adds   Type of Visit Initial PT Evaluation   Living Environment   Lives With alone   Living Arrangements house   Home Accessibility stairs to enter home;stairs within home   Number of Stairs to Enter Home 3   Number of Stairs Within Home 15   Stair Railings at Home outside, present at both sides;inside, present on right side   Transportation Available family or friend will provide   Functional Level Prior   Ambulation 1-->assistive equipment   Transferring 0-->independent   Toileting 0-->independent   Bathing 0-->independent   Dressing 0-->independent   Eating 0-->independent   Communication 0-->understands/communicates without difficulty   Swallowing 0-->swallows foods/liquids without difficulty   Cognition 0 - no cognition issues reported   Fall history within last six months no   Which of the above functional risks had a recent onset or change? none   Prior Functional Level Comment PLOf- Pt indep. with ambulation using crutches.    General Information   Onset of Illness/Injury or Date of Surgery - Date 06/21/18   Referring Physician Howard   Patient/Family Goals Statement Pt reports eventual goal of Dc to home; anticipates TCU stay   Pertinent History of Current Problem (include personal factors and/or comorbidities that impact the POC) Right hip primary degenerative joint disease; s/p  Right total hip arthroplasty   Precautions/Limitations right hip precautions   Weight-Bearing Status - RLE weight-bearing as tolerated   General Observations Pt alert- reports pain at 3/10   Cognitive Status Examination   Orientation orientation to person, place and time   Level of Consciousness alert   Follows Commands and Answers Questions able to follow multistep instructions   Personal Safety and Judgment intact   Pain Assessment   Patient Currently in Pain Yes, see Vital Sign flowsheet   Range of Motion (ROM)   ROM Comment WFL adhering to precautions   Strength   Strength Comments Unable to SLR on R    MMT: Hip, Rehab Eval   Hip ADduction - Right Side (full gravity elim. strength, NT further)   MMT: Knee, Rehab Eval   Knee Flexion - Right Side (3/5) fair, right   Knee Extension - Right Side (3/5) fair, right   Bed Mobility   Bed Mobility Comments Min. assistance supine> sitting   Transfer Skills   Transfer Comments Pt instructed onTHA precautions w/ mobility. demonstrated transfer technique, discuseed sitting surfaces.    Gait   Gait Comments Pt instructed on WBAT,gait sequence-  amb. 5 feet x1 with RW, SBA . Pt then c/o dizziness and felt his blood sugar was low, returned to sit at EOB. Nursing checked and blood sugar 178; rested and then amb 10 feet x1 with RW, SBA.    Gait Analysis   Gait Pattern Used swing-to gait   Balance   Balance Comments good dynamic standing balance    Sensory Examination   Sensory Perception no deficits were identified   General Therapy Interventions   Planned Therapy Interventions bed mobility training;gait training;ROM;strengthening;transfer training;home program guidelines   Clinical Impression   Criteria for Skilled Therapeutic Intervention yes, treatment indicated   PT Diagnosis Right total hip arthroplasty   Influenced by the following impairments Pain, decreased strength R LE   Functional limitations due to impairments altered mobility- decreased indep. w/ be mobility, transfers,gait   Clinical Presentation Stable/Uncomplicated   Clinical Presentation Rationale clincial judgement   Clinical Decision Making (Complexity) Low complexity   Therapy Frequency` 2 times/day   Predicted Duration of Therapy Intervention (days/wks) 2 days   Anticipated Equipment Needs at Discharge front wheeled walker   Anticipated Discharge Disposition Transitional Care Facility   Risk & Benefits of therapy have been explained Yes   Patient, Family & other staff in agreement with plan of care Yes   Clinical Impression Comments mulitple comorbitities; does struggle w/ mobility; would benefit fromTCU stay  "to ensure  independence    [facility] AM-PAC  \"6 Clicks\" V.2 Basic Mobility Inpatient Short Form   1. Turning from your back to your side while in a flat bed without using bedrails? 3 - A Little   2. Moving from lying on your back to sitting on the side of a flat bed without using bedrails? 3 - A Little   3. Moving to and from a bed to a chair (including a wheelchair)? 3 - A Little   4. Standing up from a chair using your arms (e.g., wheelchair, or bedside chair)? 3 - A Little   5. To walk in hospital room? 3 - A Little   6. Climbing 3-5 steps with a railing? 2 - A Lot   Basic Mobility Raw Score (Score out of 24.Lower scores equate to lower levels of function) 17   Total Evaluation Time   Total Evaluation Time (Minutes) 10     " [FreeTextEntry1] : HTN \par frontal HA

## 2020-11-18 NOTE — PROGRESS NOTES
Patient is a 60-year-old male who fell at home approximately 2 weeks ago sustaining an injury to his right dominant hand small finger.  Finger was swollen and painful and had a open wound over the radial side of the proximal phalanx.  He did not seek treatment for approximately 1 week.  He was seen by an orthopedic surgeon that told him that the finger was infected and dislocated and recommended urgent debridement and fixation.  The patient refused treatment at that time.  Several days later he went to his primary care doctor, Dr. Jm Lane who also informed him that he needed urgent treatment and prescribed clindamycin, 300 mg 4 times a day, the patient excepted the antibiotics but refused transfer to [facility].  He presents today with a swollen small finger, open wound over the radial aspect with PIP joint remaining dislocated.    Physical exam: There is cellulitis and redness over the digit with no exudate noted from the wound.  Sensation is intact, the patient has gross motion at the MCP joint and the DIP joint but not at the PIP joint.,  Surprisingly he uses the hand quite normally for grasping and manipulating.  His vital signs are normal with temperature of 97.1.  X-rays obtained today demonstrate soft tissue swelling over the small finger with a volar dislocation of the PIP joint that is now chronic.    Assessment and plan: Patient was counseled that his refusal to seek and follow medical advice has greatly complicated his recovery probability.  He has an established relationship with orthopedic group in the [facility], he recently had a hip replacement and follow-up with them and he wants to go promptly to their office.  We refilled his clindamycin as he only has 1 days supply left, surgically scrubbed, dressed and splinted the small finger of his right hand.  Once again he was urgently encouraged to have this promptly seen and treated by the hand surgeon that he has an established  relationship with.  Patient stated that he is leaving the clinic to had to their office as soon as his hand is dressed and splinted.

## 2020-11-20 ENCOUNTER — OFFICE VISIT (OUTPATIENT)
Dept: PODIATRY | Facility: OTHER | Age: 60
End: 2020-11-20
Attending: PODIATRIST
Payer: MEDICARE

## 2020-11-20 VITALS
DIASTOLIC BLOOD PRESSURE: 70 MMHG | HEART RATE: 98 BPM | BODY MASS INDEX: 37.03 KG/M2 | SYSTOLIC BLOOD PRESSURE: 100 MMHG | WEIGHT: 273 LBS | TEMPERATURE: 96.4 F | OXYGEN SATURATION: 98 %

## 2020-11-20 DIAGNOSIS — M67.88 ACHILLES TENDINOSIS OF BOTH LOWER EXTREMITIES: ICD-10-CM

## 2020-11-20 DIAGNOSIS — E11.9 DIABETES MELLITUS TYPE 2, NONINSULIN DEPENDENT (H): ICD-10-CM

## 2020-11-20 DIAGNOSIS — E11.42 DIABETIC POLYNEUROPATHY ASSOCIATED WITH TYPE 2 DIABETES MELLITUS (H): ICD-10-CM

## 2020-11-20 DIAGNOSIS — I83.11 VARICOSE VEINS OF BOTH LOWER EXTREMITIES WITH INFLAMMATION: ICD-10-CM

## 2020-11-20 DIAGNOSIS — I83.12 VARICOSE VEINS OF BOTH LOWER EXTREMITIES WITH INFLAMMATION: ICD-10-CM

## 2020-11-20 DIAGNOSIS — L60.3 ONYCHODYSTROPHY: ICD-10-CM

## 2020-11-20 DIAGNOSIS — M21.969 LOSS OF PROTECTIVE SENSATION OF SKIN OF DEFORMED FOOT: ICD-10-CM

## 2020-11-20 DIAGNOSIS — L97.512 DIABETIC ULCER OF TOE OF RIGHT FOOT ASSOCIATED WITH TYPE 2 DIABETES MELLITUS, WITH FAT LAYER EXPOSED (H): Primary | ICD-10-CM

## 2020-11-20 DIAGNOSIS — E11.621 DIABETIC ULCER OF TOE OF RIGHT FOOT ASSOCIATED WITH TYPE 2 DIABETES MELLITUS, WITH FAT LAYER EXPOSED (H): Primary | ICD-10-CM

## 2020-11-20 DIAGNOSIS — L84 CALLUS OF FOOT: ICD-10-CM

## 2020-11-20 DIAGNOSIS — R20.8 LOSS OF PROTECTIVE SENSATION OF SKIN OF DEFORMED FOOT: ICD-10-CM

## 2020-11-20 PROCEDURE — G0463 HOSPITAL OUTPT CLINIC VISIT: HCPCS

## 2020-11-20 PROCEDURE — 11042 DBRDMT SUBQ TIS 1ST 20SQCM/<: CPT | Performed by: PODIATRIST

## 2020-11-20 ASSESSMENT — PAIN SCALES - GENERAL: PAINLEVEL: NO PAIN (0)

## 2020-11-20 NOTE — NURSING NOTE
Chief Complaint   Patient presents with     RECHECK     Callus  Right Foot        Initial /70   Pulse 98   Temp 96.4  F (35.8  C) (Tympanic)   Wt 123.8 kg (273 lb)   SpO2 98%   BMI 37.03 kg/m   Estimated body mass index is 37.03 kg/m  as calculated from the following:    Height as of 10/7/20: 1.829 m (6').    Weight as of this encounter: 123.8 kg (273 lb).  Medication Reconciliation: complete  Gretchen Wyman LPN

## 2020-11-20 NOTE — PROGRESS NOTES
Chief complaint: Patient presents with:  RECHECK: Callus  Right Foot         History of Present Illness: This 60 year old NIDDM II male is seen today for a new concern regarding a new wound on his RIGHT great toe.    On 11/03/2020, patient developed a RIGHT leg infection. He is not sure what caused the infection. The infection caused him to be dizzy and he fell down his stairs and it created a wound on his RIGHT distal hallux and RIGHT 5th finger. He waited to go in to [facility] in [place], [place], on 11/06/2020. They told him he had an infection and they gave him antibiotics.    Patient's RIGHT 5th finger continued to become more red and swollen so she went back to  on 11/13/2020 and they changed his antibiotics and it improved his hand. He was referred to  to see Dr. Ho who advised him to go to [place] to re-set the finger and pin it. He did not feel comfortable driving to [place] and he couldn't find someone to give him a ride. He says he was told they could fly him to [place], but he says he can't afford it. He then saw a provider who prescribed Cilndamycin 300mg QID. Patient then saw Dr. Weir at [facility] on 11/18/2020. He refilled the Clindamycin and advised him to follow-up with a hand surgeon (patient had preferred a particular provider to perform the surgery) as soon as possible.     Patient is now scheduled to see someone in [place], [place], on Tuesday, 11/24/2020. Today he would like his RIGHT great toe evaluated. He has no pain in the toe.    Patient otherwise has a lot of numbness in his feet.     Last HbA1C was down to 10.6% to 03/04/2020 from 11.0% on 09/21/2017 and patient said it was 10.5% in January, 2020 at the [facility].  He is working on lowering this with a diet change.    No further pedal complaints today.         /70   Pulse 98   Temp 96.4  F (35.8  C) (Tympanic)   Wt 123.8 kg (273 lb)   SpO2 98%   BMI 37.03 kg/m      Patient Active Problem List   Diagnosis     Type  2 diabetes mellitus with hyperglycemia, without long-term current use of insulin (H)     Degenerative joint disease (DJD) of hip     Morbid obesity (H)     Hyperlipidemia LDL goal <100     S/P total hip arthroplasty, right     Sciatic leg pain     Essential hypertension       Past Surgical History:   Procedure Laterality Date     ARTHROPLASTY HIP Right 6/21/2018    Procedure: ARTHROPLASTY HIP;  Right Total Hip Arthroplasty;  Surgeon: Dain Lawrence MD;  Location: [facility] OR     KNEE SURGERY Right        Current Outpatient Medications   Medication     amitriptyline (ELAVIL) 50 MG tablet     Blood Glucose Monitoring Suppl (BLOOD GLUCOSE MONITOR SYSTEM) W/DEVICE KIT     clindamycin (CLEOCIN) 300 MG capsule     CLINDAMYCIN PALMITATE HCL PO     lisinopril-hydrochlorothiazide (ZESTORETIC) 20-12.5 MG tablet     metFORMIN (GLUCOPHAGE) 1000 MG tablet     omeprazole (PRILOSEC) 40 MG DR capsule     simvastatin (ZOCOR) 20 MG tablet     vitamin D2 (ERGOCALCIFEROL) 31965 units (1250 mcg) capsule     No current facility-administered medications for this visit.         No Known Allergies    Family History   Problem Relation Age of Onset     Cancer Mother      Diabetes No family hx of      Coronary Artery Disease No family hx of      Hypertension No family hx of      Hyperlipidemia No family hx of      Breast Cancer No family hx of      Colon Cancer No family hx of      Prostate Cancer No family hx of        Social History     Socioeconomic History     Marital status:      Spouse name: None     Number of children: None     Years of education: None     Highest education level: None   Occupational History     None   Social Needs     Financial resource strain: None     Food insecurity:     Worry: None     Inability: None     Transportation needs:     Medical: None     Non-medical: None   Tobacco Use     Smoking status: Former Smoker     Types: Cigars     Start date: 1/1/1978     Last attempt to quit: 6/21/2018     Years  since quittin.7     Smokeless tobacco: Former User   Substance and Sexual Activity     Alcohol use: No     Drug use: No     Sexual activity: None   Lifestyle     Physical activity:     Days per week: None     Minutes per session: None     Stress: None   Relationships     Social connections:     Talks on phone: None     Gets together: None     Attends Confucianism service: None     Active member of club or organization: None     Attends meetings of clubs or organizations: None     Relationship status: None     Intimate partner violence:     Fear of current or ex partner: None     Emotionally abused: None     Physically abused: None     Forced sexual activity: None   Other Topics Concern     Parent/sibling w/ CABG, MI or angioplasty before 65F 55M? Not Asked   Social History Narrative     None       ROS: 10 point ROS neg other than the symptoms noted above in the HPI.  EXAM  Constitutional: healthy, alert and no distress    Psychiatric: mentation appears normal and affect normal/bright    VASCULAR:  -Dorsalis pedis pulse +2/4 b/l  -Posterior tibial pulse +1/4 b/l  -Capillary refill time < 3 seconds to b/l hallux  -Hair growth Absent to b/l anterior legs and ankles  -Varicosities to bilateral feet  -Mild 1+ pitting edema to bilateral feet and ankles  NEURO:  -Light touch sensation ABSENT to b/l plantar forefoot  -Protective sensation diminished with SWM +0/10 RIGHT and +1/10 LEFT on 10/07/2020  -Protective sensation diminished with SWM +0/10 RIGHT and +0/10 LEFT on 2020  ---Previously +1/10 on RIGHT and +4/10 on LEFT on 2020    DERM:  -Skin temperature cool to bilateral feet  -Skin texture excessively thickened and atrophic to bilateral feet  -Toenails elongated, thickened, dystrophic and discolored x 10    Wound Location:  RIGHT distal medial hallux  2020  Measurement:  2.1cm x 2.1cm x 0.1cm to subcutaneous tissue  Drainage:  Moderate serous  Odor:  None  Undermining:  None  Edges:  Moderate  hyperkeratotic lesion   Base:  50% granular, 50% fibrotic (50% loose eschar had fibrotic tissue beneath the eschar)  Surrounding Skin: Intact  No severe erythema, no ascending erythema, no calor, no purulence, no malodor, no other SOI.     MSK:  -Muscle strength of ankles +5/5 for dorsiflexion, plantarflexion, ABDUction and ADDuction b/l with pain against resistance to plantarflexion bilaterally     ============================================================    ASSESSMENT:    (E11.621,  L97.512) Diabetic ulcer of toe of right foot associated with type 2 diabetes mellitus, with fat layer exposed (H)  (primary encounter diagnosis)    (L60.3) Onychodystrophy  (primary encounter diagnosis)    (L84) Callus of foot    (M67.88) Achilles tendinosis of both lower extremities    (M21.969,  R20.8) Loss of protective sensation of skin of deformed foot    (E11.9) Diabetes mellitus type 2, noninsulin dependent (H)    (E11.42) Diabetic polyneuropathy associated with type 2 diabetes mellitus (H)    (I83.11,  I83.12) Varicose veins of both lower extremities with inflammation        PLAN:  -Patient evaluated and examined. Treatment options discussed with no educational barriers noted.  -Excisional debridement of wound on RIGHT distal hallux with a sharp tissue nipper to subcutaneous tissue (<20 cm squared)  ---Dressed wound with Dakin's soaked gauze, dry gauze and tape  ---Patient to change daily  ---Applied add'l supplies to patient    -There are no current SOI from the wound on the RIGHT hallux.  --Patient was instructed to look for SOI (redness, swelling, pain, purulence, fever, chills, nausea, vomiting) and to return to podiatry or the emergency department immediately if there are any SOI. Patient also advised to go to the ED as soon as possible if any wound becomes infected (toe vs. Finger vs. Other wounds). Patient is in agreement with this plan.  -Offload the wound as much as possible by minimizing walking and elevate the  foot at home.    -DM shoes: New referral placed for new shoes on 10/07/2020    -Patient in agreement with the above treatment plan and all of patient's questions were answered.      RTC two weeks to treat the RIGHT distal hallux  RTC at the already scheduled appointment on 01/06/2020 for diabetic foot exam and high risk nail debridement         Mini Winters DPM

## 2020-12-18 ENCOUNTER — TELEPHONE (OUTPATIENT)
Dept: WOUND CARE | Facility: OTHER | Age: 60
End: 2020-12-18

## 2020-12-18 NOTE — TELEPHONE ENCOUNTER
I talked to patient regarding a wound care referral we received from [facility] provider, Nafisa Banks, and he stated he does not need an appointment with wound care as he saw his surgeon and he told him what to do for the left great toe wound.

## 2020-12-29 ENCOUNTER — TELEPHONE (OUTPATIENT)
Dept: FAMILY MEDICINE | Facility: OTHER | Age: 60
End: 2020-12-29

## 2020-12-29 DIAGNOSIS — E11.65 TYPE 2 DIABETES MELLITUS WITH HYPERGLYCEMIA, WITHOUT LONG-TERM CURRENT USE OF INSULIN (H): Primary | ICD-10-CM

## 2020-12-29 NOTE — TELEPHONE ENCOUNTER
Called and left message to come in for DM labs. Labs signed. Please schedule lab appointment before 12-

## 2021-01-22 ENCOUNTER — APPOINTMENT (OUTPATIENT)
Dept: ULTRASOUND IMAGING | Facility: HOSPITAL | Age: 61
End: 2021-01-22
Attending: NURSE PRACTITIONER
Payer: COMMERCIAL

## 2021-01-22 ENCOUNTER — HOSPITAL ENCOUNTER (EMERGENCY)
Facility: HOSPITAL | Age: 61
Discharge: HOME | End: 2021-01-22
Attending: NURSE PRACTITIONER | Admitting: NURSE PRACTITIONER
Payer: COMMERCIAL

## 2021-01-22 VITALS
HEART RATE: 106 BPM | OXYGEN SATURATION: 96 % | RESPIRATION RATE: 20 BRPM | TEMPERATURE: 97.6 F | DIASTOLIC BLOOD PRESSURE: 77 MMHG | SYSTOLIC BLOOD PRESSURE: 125 MMHG

## 2021-01-22 DIAGNOSIS — M79.604 PAIN OF RIGHT LOWER EXTREMITY: ICD-10-CM

## 2021-01-22 PROCEDURE — 99213 OFFICE O/P EST LOW 20 MIN: CPT | Performed by: NURSE PRACTITIONER

## 2021-01-22 PROCEDURE — G0463 HOSPITAL OUTPT CLINIC VISIT: HCPCS | Mod: 25

## 2021-01-22 PROCEDURE — 93971 EXTREMITY STUDY: CPT | Mod: RT

## 2021-01-22 ASSESSMENT — ENCOUNTER SYMPTOMS
DIZZINESS: 0
NAUSEA: 0
CHILLS: 0
HEADACHES: 0
ACTIVITY CHANGE: 1
FEVER: 0
VOMITING: 0

## 2021-01-22 NOTE — ED PROVIDER NOTES
"  History     Chief Complaint   Patient presents with     Deep Vein Thrombosis     rule out     HPI  Aldo Castillo is a 60 year old male who presents with right lower leg pain and swelling that started one week ago after he  Was adjusted per his chiropractor. The pain is 10/10 when he ambulates on his right leg. He saw his PCP today and his PCP sent him over here for an ultrasound of his leg. He did have an infection in the right leg that was treated with antibiotics and has cleared up at this time. He has numbness and tingling in his right foot.  Denies previous injuries. He has a foot ulcer that is being treated per podiatry. States he is not here for his foot. \"It has been that way for a long time,\" including the redness in his large toe where the ulcer is. History of DM II. He is currently on clindamycin for an infection in his right little finger that is being followed per a surgeon. Denies fevers and chills.    Musculoskeletal problem/pain      Duration: one week    Description  Location: right leg pain with swelling denies redness    Intensity:  10/10    Accompanying signs and symptoms: numbness and tingling foot    History  Previous similar problem: no   Previous evaluation:  none    Precipitating or alleviating factors:  Trauma or overuse: YES- injury in November. Was treated with antibiotics and his leg cleared up. Went into the chiropractor one week ago and had his back adjusted. Has had pain in his right lower leg since. Upper leg is sore but not numb and tingling.   Aggravating factors include: sitting    Therapies tried and outcome: nothing         Allergies:  No Known Allergies    Problem List:    Patient Active Problem List    Diagnosis Date Noted     Sciatic leg pain 06/22/2018     Priority: Medium     Essential hypertension 06/22/2018     Priority: Medium     Degenerative joint disease (DJD) of hip 06/21/2018     Priority: Medium     Overview:   Right       Hyperlipidemia LDL goal <100 " 2018     Priority: Medium     S/P total hip arthroplasty, right 2018     Priority: Medium     Type 2 diabetes mellitus with hyperglycemia, without long-term current use of insulin (H) 10/27/2017     Priority: Medium     Hemoglobin A1c 6.9 on 2018.       Morbid obesity (H) 2017     Priority: Medium        Past Medical History:    Past Medical History:   Diagnosis Date     Diabetes (H)        Past Surgical History:    Past Surgical History:   Procedure Laterality Date     ARTHROPLASTY HIP Right 2018    Procedure: ARTHROPLASTY HIP;  Right Total Hip Arthroplasty;  Surgeon: Dain Lawrence MD;  Location: [facility] OR     KNEE SURGERY Right        Family History:    Family History   Problem Relation Age of Onset     Cancer Mother      Diabetes No family hx of      Coronary Artery Disease No family hx of      Hypertension No family hx of      Hyperlipidemia No family hx of      Breast Cancer No family hx of      Colon Cancer No family hx of      Prostate Cancer No family hx of        Social History:  Marital Status:   [4]  Social History     Tobacco Use     Smoking status: Former Smoker     Types: Cigars     Start date: 1978     Quit date: 8/3/2020     Years since quittin.4     Smokeless tobacco: Former User     Quit date: 2019   Substance Use Topics     Alcohol use: No     Drug use: No        Medications:         amitriptyline (ELAVIL) 50 MG tablet       Blood Glucose Monitoring Suppl (BLOOD GLUCOSE MONITOR SYSTEM) W/DEVICE KIT       CLINDAMYCIN PALMITATE HCL PO       lisinopril-hydrochlorothiazide (ZESTORETIC) 20-12.5 MG tablet       metFORMIN (GLUCOPHAGE) 1000 MG tablet       omeprazole (PRILOSEC) 40 MG DR capsule       simvastatin (ZOCOR) 20 MG tablet       vitamin D2 (ERGOCALCIFEROL) 76902 units (1250 mcg) capsule          Review of Systems   Constitutional: Positive for activity change. Negative for chills and fever.   Gastrointestinal: Negative for nausea and  vomiting.   Musculoskeletal:        Right lower leg pain and swelling   Skin: Negative.    Neurological: Negative for dizziness and headaches.       Physical Exam   BP: 125/77  Pulse: 106  Temp: 97.6  F (36.4  C)  Resp: 20  SpO2: 96 %      Physical Exam  Vitals signs and nursing note reviewed.   Constitutional:       General: He is in acute distress (mild to moderate).      Appearance: He is obese.      Comments: Very poorly kept   HENT:      Head: Normocephalic.   Cardiovascular:      Rate and Rhythm: Tachycardia present.      Pulses:           Dorsalis pedis pulses are detected w/ Doppler on the right side.        Posterior tibial pulses are detected w/ Doppler on the right side.   Pulmonary:      Effort: Pulmonary effort is normal.   Musculoskeletal:         General: Swelling and tenderness present.   Feet:      Right foot:      Skin integrity: Skin breakdown and erythema present.      Toenail Condition: Right toenails are abnormally thick.      Comments: Right hallux has an ulcer on the bottom of the toe that  is severely covered with dirt. The dorsal region of the great toe is erythematous.   Skin:     General: Skin is warm and dry.      Findings: Erythema present.   Neurological:      Mental Status: He is alert and oriented to person, place, and time.   Psychiatric:         Behavior: Behavior normal.         ED Course        Procedures             Results for orders placed or performed during the hospital encounter of 01/22/21 (from the past 24 hour(s))   US Lower Extremity Venous Duplex Right    Narrative    US LOWER EXTREMITY VENOUS DUPLEX RIGHT  1/22/2021 4:19 PM    History:Male, age 60 years; ; pain and swelling in right lower,  swollen right lower leg. DM. obesity.    Comparison: None.    Technique: Grayscale and color Doppler ultrasound of the right  lower  extremity deep venous structures.    Findings:   The deep venous structures are patent and fully compressible. There is  normal augmentation of  "flow.      Impression    Impression:  No evidence of DVT.     3.7 x 1.2 x 1.4 cm reactive right inguinal lymph node.    ASHLY WELLS MD       Medications - No data to display    Assessments & Plan (with Medical Decision Making)     I have reviewed the nursing notes.    I have reviewed the findings, diagnosis, plan and need for follow up with the patient.  (M79.716) Pain of right lower extremity  Comment: 60 year old male who presents with right lower leg pain and swelling that started one week ago after he  Was adjusted per his chiropractor. The pain is 10/10 when he ambulates on his right leg. He saw his PCP today and his PCP sent him over here for an ultrasound of his leg. He did have an infection in the right leg that was treated with antibiotics and has cleared up at this time. He has numbness and tingling in his right foot.  Denies previous injuries. He has a foot ulcer that is being treated per podiatry. States he is not here for his foot. \"It has been that way for a long time,\" including the redness in his large toe where the ulcer is. History of DM II. He is currently on clindamycin for an infection in his right little finger that is being followed per a surgeon. Denies fevers and chills.  MDM: right lower extremity has 1 + swelling over the upper lateral portion. Tenderness noted with palpation over this region. Mild erythema noted in this region. Right pedal pulses obtained with doppler.  Right hallux has an ulcer on the bottom of the toe that  is severely covered with dirt. The dorsal region of the great toe is erythematous.  US of right lower extremity per radiology:  Impression: No evidence of DVT.   3.7 x 1.2 x 1.4 cm reactive right inguinal lymph node.  Plan: education provided for myalgias and RICE.  Keep affected extremity elevated as much as possible for next 24 - 48 hours. Ice to affected area 20 minutes every hour as needed for comfort. After 48 hours you can apply heat.  Tylenol 650 to 1000 " mg every four to six hours as needed (not to exceed more than 4000 mg in a 24 hour period). May use interchangeably. Suggest medicating around the clock for the next 24-48 hours. . Follow up with primary provider as needed  Keep podiatry appointment on 2/2/2021  These discharge instructions and medications were reviewed with him and understanding verbalized.  Discharge Medication List as of 1/22/2021  4:40 PM          Final diagnoses:   Pain of right lower extremity       1/22/2021   [facility]       Laura Bass, NKECHI  01/22/21 [id number]

## 2021-01-22 NOTE — ED TRIAGE NOTES
Pt presents in a wheelchair and is holding onto crutches. States that he has had right calf pain for 1 week. States that he has had difficulty bearing weight at times on his right leg.

## 2021-01-22 NOTE — ED TRIAGE NOTES
Patient in with complaints of right sided calf pain that he states the pain started since Monday. He was seen in clinic and told to come get an ultrasound. He actually thought he was just going there for a DVT rule out.

## 2021-01-22 NOTE — DISCHARGE INSTRUCTIONS
Keep affected extremity elevated as much as possible for next 24 - 48 hours. Ice to affected area 20 minutes every hour as needed for comfort. After 48 hours you can apply heat.  Tylenol 650 to 1000 mg every four to six hours as needed (not to exceed more than 4000 mg in a 24 hour period). May use interchangeably. Suggest medicating around the clock for the next 24-48 hours. . Follow up with primary provider as needed  Keep podiatry appointment on 2/2/2021

## 2021-01-27 ENCOUNTER — OFFICE VISIT (OUTPATIENT)
Dept: PODIATRY | Facility: OTHER | Age: 61
End: 2021-01-27
Attending: PODIATRIST
Payer: COMMERCIAL

## 2021-01-27 VITALS
TEMPERATURE: 97 F | SYSTOLIC BLOOD PRESSURE: 126 MMHG | BODY MASS INDEX: 37.03 KG/M2 | HEART RATE: 105 BPM | DIASTOLIC BLOOD PRESSURE: 74 MMHG | WEIGHT: 273 LBS | OXYGEN SATURATION: 98 %

## 2021-01-27 DIAGNOSIS — F17.210 CIGARETTE NICOTINE DEPENDENCE WITHOUT COMPLICATION: ICD-10-CM

## 2021-01-27 DIAGNOSIS — R20.8 LOSS OF PROTECTIVE SENSATION OF SKIN OF DEFORMED FOOT: ICD-10-CM

## 2021-01-27 DIAGNOSIS — L08.9 DIABETIC FOOT INFECTION (H): ICD-10-CM

## 2021-01-27 DIAGNOSIS — L84 CALLUS OF FOOT: ICD-10-CM

## 2021-01-27 DIAGNOSIS — E11.9 DIABETES MELLITUS TYPE 2, NONINSULIN DEPENDENT (H): ICD-10-CM

## 2021-01-27 DIAGNOSIS — Z71.6 TOBACCO ABUSE COUNSELING: ICD-10-CM

## 2021-01-27 DIAGNOSIS — I96 GANGRENE OF RIGHT FOOT (H): ICD-10-CM

## 2021-01-27 DIAGNOSIS — L97.514 DIABETIC ULCER OF TOE OF RIGHT FOOT ASSOCIATED WITH TYPE 2 DIABETES MELLITUS, WITH NECROSIS OF BONE (H): Primary | ICD-10-CM

## 2021-01-27 DIAGNOSIS — I83.12 VARICOSE VEINS OF BOTH LOWER EXTREMITIES WITH INFLAMMATION: ICD-10-CM

## 2021-01-27 DIAGNOSIS — E11.628 DIABETIC FOOT INFECTION (H): ICD-10-CM

## 2021-01-27 DIAGNOSIS — L60.3 ONYCHODYSTROPHY: ICD-10-CM

## 2021-01-27 DIAGNOSIS — E11.621 DIABETIC ULCER OF TOE OF RIGHT FOOT ASSOCIATED WITH TYPE 2 DIABETES MELLITUS, WITH NECROSIS OF BONE (H): Primary | ICD-10-CM

## 2021-01-27 DIAGNOSIS — E11.42 DIABETIC POLYNEUROPATHY ASSOCIATED WITH TYPE 2 DIABETES MELLITUS (H): ICD-10-CM

## 2021-01-27 DIAGNOSIS — M67.88 ACHILLES TENDINOSIS OF BOTH LOWER EXTREMITIES: ICD-10-CM

## 2021-01-27 DIAGNOSIS — M21.969 LOSS OF PROTECTIVE SENSATION OF SKIN OF DEFORMED FOOT: ICD-10-CM

## 2021-01-27 DIAGNOSIS — I83.11 VARICOSE VEINS OF BOTH LOWER EXTREMITIES WITH INFLAMMATION: ICD-10-CM

## 2021-01-27 PROCEDURE — 99215 OFFICE O/P EST HI 40 MIN: CPT | Mod: 25 | Performed by: PODIATRIST

## 2021-01-27 PROCEDURE — 11043 DBRDMT MUSC&/FSCA 1ST 20/<: CPT | Performed by: PODIATRIST

## 2021-01-27 PROCEDURE — G0463 HOSPITAL OUTPT CLINIC VISIT: HCPCS | Mod: 25

## 2021-01-27 ASSESSMENT — PAIN SCALES - GENERAL: PAINLEVEL: NO PAIN (0)

## 2021-01-27 NOTE — PROGRESS NOTES
Chief complaint: Patient presents with:  RECHECK      History of Present Illness: This 60 year old NIDDM II male is seen today for a new concern regarding a wound on his RIGHT great toe. He says the RIGHT hallux wound healed shortly after his last appointment. He canceled his last appointment around December, 2020, because he says he had an appointment with his hand surgeon in [place]. He reshceduled for last Thursday, 01/21/2021, but he was rescheduled again. He says he thought his wound had been healed since shortly after his last appointment because he noticed ha callus and scab over the toe. As of the evening last Thursday, 01/21/2021, the patient says he showered and a chunk of skin fell off the toe. He went to the ED for leg pain and to evaluate for a DVT on Friday, 01/22/2021, and they noticed the patient had a wound on his toe. However, the patient told them he was in the ED for his leg pain and that his toe would soon be treated at his podiatry appointment.    He is still on Clindamycin for his RIGHT fifth finger injury. He is following up with a hand surgeon in [place], [place]. As of right now, there are no surgical interventions for his contracted RIGHT fifth digit.    Patient has sciatic nerve in his leg is causing pain, but his RIGHT hallux is not painful. He changes the dressing a couple times a week when his low back back is not bothering him with some bacitracin and gauze.     No further pedal complaints today.     He is smoking about 1 cigar a week and he is not interested in quitting entirely.    No further pedal complaints today.     -------------------------------------------  History of RIGHT hallux ulcer:    On 11/03/2020, patient developed a RIGHT leg infection. He is not sure what caused the infection. The infection caused him to be dizzy and he fell down his stairs and it created a wound on his RIGHT distal hallux and RIGHT 5th finger. He waited to go in to [facility] in [place],  [place], on 11/06/2020. They told him he had an infection and they gave him antibiotics.    Patient's RIGHT 5th finger continued to become more red and swollen so she went back to  on 11/13/2020 and they changed his antibiotics and it improved his hand. He was referred to  to see Dr. Ho who advised him to go to [place] to re-set the finger and pin it. He did not feel comfortable driving to [place] and he couldn't find someone to give him a ride. He says he was told they could fly him to [place], but he says he can't afford it. He then saw a provider who prescribed Cilndamycin 300mg QID. Patient then saw Dr. Weir at [facility] on 11/18/2020. He refilled the Clindamycin and advised him to follow-up with a hand surgeon (patient had preferred a particular provider to perform the surgery) as soon as possible.     Patient followed up with the hand surgeon in [place], [place], for his RIGHT 5th finger infection. He was placed on eight weeks of Clindamycin and he is scheduled to stop taking the antibiotics on 01/29/2021.     Last HbA1C was down to 10.6% to 03/04/2020 from 11.0% on 09/21/2017 and patient said it was 10.5% in January, 2020 at the [facility].  He is working on lowering this with a diet change.      /74   Pulse 105   Temp 97  F (36.1  C) (Tympanic)   Wt 123.8 kg (273 lb)   SpO2 98%   BMI 37.03 kg/m      Patient Active Problem List   Diagnosis     Type 2 diabetes mellitus with hyperglycemia, without long-term current use of insulin (H)     Degenerative joint disease (DJD) of hip     Morbid obesity (H)     Hyperlipidemia LDL goal <100     S/P total hip arthroplasty, right     Sciatic leg pain     Essential hypertension       Past Surgical History:   Procedure Laterality Date     ARTHROPLASTY HIP Right 6/21/2018    Procedure: ARTHROPLASTY HIP;  Right Total Hip Arthroplasty;  Surgeon: Dain Lawrence MD;  Location: [facility] OR     KNEE SURGERY Right        Current Outpatient Medications   Medication      amitriptyline (ELAVIL) 50 MG tablet     Blood Glucose Monitoring Suppl (BLOOD GLUCOSE MONITOR SYSTEM) W/DEVICE KIT     CLINDAMYCIN PALMITATE HCL PO     lisinopril-hydrochlorothiazide (ZESTORETIC) 20-12.5 MG tablet     metFORMIN (GLUCOPHAGE) 1000 MG tablet     omeprazole (PRILOSEC) 40 MG DR capsule     simvastatin (ZOCOR) 20 MG tablet     vitamin D2 (ERGOCALCIFEROL) 94808 units (1250 mcg) capsule     No current facility-administered medications for this visit.         No Known Allergies    Family History   Problem Relation Age of Onset     Cancer Mother      Diabetes No family hx of      Coronary Artery Disease No family hx of      Hypertension No family hx of      Hyperlipidemia No family hx of      Breast Cancer No family hx of      Colon Cancer No family hx of      Prostate Cancer No family hx of        Social History     Socioeconomic History     Marital status:      Spouse name: None     Number of children: None     Years of education: None     Highest education level: None   Occupational History     None   Social Needs     Financial resource strain: None     Food insecurity:     Worry: None     Inability: None     Transportation needs:     Medical: None     Non-medical: None   Tobacco Use     Smoking status: Former Smoker     Types: Cigars     Start date: 1978     Last attempt to quit: 2018     Years since quittin.7     Smokeless tobacco: Former User   Substance and Sexual Activity     Alcohol use: No     Drug use: No     Sexual activity: None   Lifestyle     Physical activity:     Days per week: None     Minutes per session: None     Stress: None   Relationships     Social connections:     Talks on phone: None     Gets together: None     Attends [employer] service: None     Active member of club or organization: None     Attends meetings of clubs or organizations: None     Relationship status: None     Intimate partner violence:     Fear of current or ex partner: None     Emotionally  abused: None     Physically abused: None     Forced sexual activity: None   Other Topics Concern     Parent/sibling w/ CABG, MI or angioplasty before 65F 55M? Not Asked   Social History Narrative     None       ROS: 10 point ROS neg other than the symptoms noted above in the HPI.  EXAM  Constitutional: healthy, alert and no distress    Psychiatric: mentation appears normal and affect normal/bright    VASCULAR:  -Dorsalis pedis pulse +2/4 b/l  -Posterior tibial pulse +1/4 b/l  -Capillary refill time < 3 seconds to b/l hallux  -Hair growth Absent to b/l anterior legs and ankles  -Varicosities to bilateral feet  -Mild 1+ pitting edema to bilateral feet and ankles  NEURO:  -Light touch sensation ABSENT to b/l plantar forefoot  -Protective sensation diminished with SWM +0/10 RIGHT and +1/10 LEFT on 10/07/2020  -Protective sensation diminished with SWM +0/10 RIGHT and +0/10 LEFT on 06/24/2020  ---Previously +1/10 on RIGHT and +4/10 on LEFT on 03/04/2020    DERM:    -Skin temperature cool to bilateral feet  -Skin texture excessively thickened and atrophic to bilateral feet  -Toenails elongated, thickened, dystrophic and discolored x 10    -------------------------    Wound Location: RIGHT distal hallux has an open ulcer estimated to measure 1cm x 2cm to the capsule over the bone  Drainage:  Moderate serous  Odor:  None  Undermining:  None post debridement  Edges:  Moderate hyperkeratotic lesion  Base:  100% viable  Surrounding Skin: Intact  No severe erythema, no ascending erythema, no calor, no purulence, no malodor, no other SOI.     -------------------------    Wound Location:  RIGHT distal medial hallux  01/27/2021  Measurement: Wound travels across the entire distal hallux and continues beneath the nail bed. Wound not measured but is noted to cover the entire distal phalanx beneath the undermining of the skin.  Drainage:  Purulent grey/green draiange  Odor:  Moderate necrotic malodor  Undermining:  Moderate in every  direction beneath the skin  Edges:  Non-viable, macerated  Base:  100% bone  Surrounding Skin: Intact  Mild erythema and mild calor compared to LEFT foot, mild edema compared to LEFT foot    11/20/2020  Measurement:  2.1cm x 2.1cm x 0.1cm to subcutaneous tissue  Drainage:  Moderate serous  Odor:  None  Undermining:  None  Edges:  Moderate hyperkeratotic lesion   Base:  50% granular, 50% fibrotic (50% loose eschar had fibrotic tissue beneath the eschar)  Surrounding Skin: Intact  No severe erythema, no ascending erythema, no calor, no purulence, no malodor, no other SOI.     MSK:  -Muscle strength of ankles +5/5 for dorsiflexion, plantarflexion, ABDUction and ADDuction b/l with pain against resistance to plantarflexion bilaterally     ============================================================    ASSESSMENT:    (E11.621,  L97.514) Diabetic ulcer of toe of right foot associated with type 2 diabetes mellitus, with necrosis of bone (H)  (primary encounter diagnosis)    (E11.628,  L08.9) Diabetic foot infection (H)    (I96) Gangrene of right foot (H)    (L60.3) Onychodystrophy  (primary encounter diagnosis)    (L84) Callus of foot    (M67.88) Achilles tendinosis of both lower extremities    (M21.969,  R20.8) Loss of protective sensation of skin of deformed foot    (E11.9) Diabetes mellitus type 2, noninsulin dependent (H)    (E11.42) Diabetic polyneuropathy associated with type 2 diabetes mellitus (H)    (I83.11,  I83.12) Varicose veins of both lower extremities with inflammation        PLAN:  -Patient evaluated and examined. Treatment options discussed with no educational barriers noted.  -Excisional debridement of wound on RIGHT distal hallux with a sharp tissue nipper to the level of the bone (did not debride the bone) and LEFT distal hallux to the level of the muscle/capsule layer (did not debride the bone)  (<20 cm squared)  ---Dressed wound with Dakin's soaked gauze, dry gauze and tape    -Patient's RIGHT hallux  is infected and requires an I&D with a minimal hallux amputation. Based on extent of infection, he may require a proximal medial 1st ray or partial first ray amputation. Although the tissues are fully necrotic to the distal hallux, it is suspected this wound has had a chronic infection for a period of time and looks relatively stable with minimal erythema, minimal calor, and minimal edema. The patient did not follow-up for his last appointment, so this wound unlikely fully healed like the patient thought when he noticed a new scab over the top of the toe. It is likely that the infection did not quickly spread in the foot because of the patient's long-term Clindamycin regiment he is taking for his RIGHT fifth digit infection.  -Patient was advised to have an I&D with an amputation of the hallux and removal of all further non-viable soft tissue and bone to prevent worsening of the infection which could cause a life threatening or limb threatening infection.  -Initially ordered an x-ray, stat MRI, CBC, CRP, ESR, and BMP and called the operating room for a surgical start time. However, upon calling imaging, they noted that the patient has [employer] insurance that is not covered by this facility. At this time, other options were discussed with the patient. Other providers in the area who could perform the surgery consist of Dr. Disla at  or Dr. Hassan at [facility] in [place], [place]. Patient does not want to travel to [place]. He is requesting to be seen by Dr. Hassan. A phone call was placed to Dr. Hassan at 4:27 p.m. regaring the patient's situation and he asked that the patient present to the ED in [place], [place]. The patient does not feel ill and although his foot appears necrotic, it does appear stable enough to transport to the ED. The ED provider at [facility] in [place], [place], was also called and prepared for the patient's arrival with patient's history and current wound status.  -Patient expressed his concern about  how he would pay for today's office visit since he didn't know [employer] was no longer covered at [facility]. A message was sent to the  to have a catarino call the patient to discuss this with him.  -Over one hour of time was spent contacting other providers and coordinating this patient's care for his RIGHT foot ulcer beyond the debridement of his foot.  -Patient agreed to present to the [facility] ED. He says his truck is in the shop and ready to be picked up within one hour in which case he will go straight to the ED at [facility] in [place], [place], where they are expecting him. He understands the urgency of his foot receiving treatment and that delaying treatment could result in a life threatening infection or a proximal foot or leg amputation.  -Patient in agreement with the above treatment plan and all of patient's questions were answered.      Patient's care is being passed off to [facility]        Mini Winters DPM

## 2021-01-27 NOTE — PATIENT INSTRUCTIONS
Patient's insurance is not accepted at [facility].    - Dr. Mini Winters, JUAN, spoke with Dr. Angel Hassan DPM, at [facility] on 01/27/2021 at 4:28 p.m. He is requesting that the [facility] lab work[facility] for the patient's gangrenous RIGHT great toe.   - Patient is being advised to go to to the ED tonight on 01/27/2021    - Dr. Hassan asked for the ED to call him tonight on 01/27/2021 with x-ray results and lab results because the patient will need to go on his schedule (either clinic or surgery) right away tomorrow on 01/28/2021.

## 2021-01-27 NOTE — NURSING NOTE
Chief Complaint   Patient presents with     RECHECK       Initial /74   Pulse 105   Temp 97  F (36.1  C) (Tympanic)   Wt 123.8 kg (273 lb)   SpO2 98%   BMI 37.03 kg/m   Estimated body mass index is 37.03 kg/m  as calculated from the following:    Height as of 10/7/20: 1.829 m (6').    Weight as of this encounter: 123.8 kg (273 lb).  Medication Reconciliation: complete  Donya Bauer LPN

## 2025-01-07 ENCOUNTER — HOSPITAL ENCOUNTER (EMERGENCY)
Facility: HOSPITAL | Age: 65
Discharge: OTHER INSTITUTION | End: 2025-01-08
Attending: INTERNAL MEDICINE
Payer: COMMERCIAL

## 2025-01-07 ENCOUNTER — APPOINTMENT (OUTPATIENT)
Dept: CT IMAGING | Facility: HOSPITAL | Age: 65
End: 2025-01-07
Attending: NURSE PRACTITIONER
Payer: COMMERCIAL

## 2025-01-07 DIAGNOSIS — L97.509 DIABETIC FOOT ULCER (H): ICD-10-CM

## 2025-01-07 DIAGNOSIS — E11.621 DIABETIC FOOT ULCER (H): ICD-10-CM

## 2025-01-07 DIAGNOSIS — L03.115 CELLULITIS OF RIGHT LOWER EXTREMITY: ICD-10-CM

## 2025-01-07 PROBLEM — G62.9 PERIPHERAL NEUROPATHY: Status: ACTIVE | Noted: 2021-11-01

## 2025-01-07 PROBLEM — I73.9 PAD (PERIPHERAL ARTERY DISEASE): Status: ACTIVE | Noted: 2021-11-01

## 2025-01-07 PROBLEM — Z89.419 HISTORY OF AMPUTATION OF HALLUX: Status: ACTIVE | Noted: 2021-01-31

## 2025-01-07 PROBLEM — I25.10 CORONARY ATHEROSCLEROSIS: Status: ACTIVE | Noted: 2024-10-11

## 2025-01-07 PROBLEM — N18.32 STAGE 3B CHRONIC KIDNEY DISEASE (H): Status: ACTIVE | Noted: 2023-12-11

## 2025-01-07 PROBLEM — K21.9 GASTROESOPHAGEAL REFLUX DISEASE: Status: ACTIVE | Noted: 2021-01-06

## 2025-01-07 LAB
ANION GAP SERPL CALCULATED.3IONS-SCNC: 16 MMOL/L (ref 7–15)
BUN SERPL-MCNC: 32.4 MG/DL (ref 8–23)
CALCIUM SERPL-MCNC: 9.9 MG/DL (ref 8.8–10.4)
CHLORIDE SERPL-SCNC: 100 MMOL/L (ref 98–107)
CREAT SERPL-MCNC: 1.49 MG/DL (ref 0.67–1.17)
EGFRCR SERPLBLD CKD-EPI 2021: 52 ML/MIN/1.73M2
ERYTHROCYTE [DISTWIDTH] IN BLOOD BY AUTOMATED COUNT: 15.9 % (ref 10–15)
GLUCOSE SERPL-MCNC: 182 MG/DL (ref 70–99)
HCO3 SERPL-SCNC: 21 MMOL/L (ref 22–29)
HCT VFR BLD AUTO: 40 % (ref 40–53)
HGB BLD-MCNC: 13.2 G/DL (ref 13.3–17.7)
LACTATE SERPL-SCNC: 1.5 MMOL/L (ref 0.7–2)
MAGNESIUM SERPL-MCNC: 1.9 MG/DL (ref 1.7–2.3)
MCH RBC QN AUTO: 27.8 PG (ref 26.5–33)
MCHC RBC AUTO-ENTMCNC: 33 G/DL (ref 31.5–36.5)
MCV RBC AUTO: 84 FL (ref 78–100)
PHOSPHATE SERPL-MCNC: 2.4 MG/DL (ref 2.5–4.5)
PLATELET # BLD AUTO: 279 10E3/UL (ref 150–450)
POTASSIUM SERPL-SCNC: 4.4 MMOL/L (ref 3.4–5.3)
PROCALCITONIN SERPL IA-MCNC: 0.21 NG/ML
RBC # BLD AUTO: 4.75 10E6/UL (ref 4.4–5.9)
SODIUM SERPL-SCNC: 137 MMOL/L (ref 135–145)
WBC # BLD AUTO: 11.6 10E3/UL (ref 4–11)

## 2025-01-07 PROCEDURE — 84100 ASSAY OF PHOSPHORUS: CPT | Performed by: NURSE PRACTITIONER

## 2025-01-07 PROCEDURE — 99285 EMERGENCY DEPT VISIT HI MDM: CPT | Performed by: NURSE PRACTITIONER

## 2025-01-07 PROCEDURE — 84145 PROCALCITONIN (PCT): CPT | Performed by: NURSE PRACTITIONER

## 2025-01-07 PROCEDURE — 36415 COLL VENOUS BLD VENIPUNCTURE: CPT | Performed by: NURSE PRACTITIONER

## 2025-01-07 PROCEDURE — 73701 CT LOWER EXTREMITY W/DYE: CPT | Mod: RT

## 2025-01-07 PROCEDURE — 99285 EMERGENCY DEPT VISIT HI MDM: CPT | Mod: 25

## 2025-01-07 PROCEDURE — 96365 THER/PROPH/DIAG IV INF INIT: CPT

## 2025-01-07 PROCEDURE — 87040 BLOOD CULTURE FOR BACTERIA: CPT | Performed by: NURSE PRACTITIONER

## 2025-01-07 PROCEDURE — 86140 C-REACTIVE PROTEIN: CPT | Performed by: INTERNAL MEDICINE

## 2025-01-07 PROCEDURE — 85652 RBC SED RATE AUTOMATED: CPT | Performed by: INTERNAL MEDICINE

## 2025-01-07 PROCEDURE — 250N000011 HC RX IP 250 OP 636: Performed by: INTERNAL MEDICINE

## 2025-01-07 PROCEDURE — 87070 CULTURE OTHR SPECIMN AEROBIC: CPT | Performed by: NURSE PRACTITIONER

## 2025-01-07 PROCEDURE — 36415 COLL VENOUS BLD VENIPUNCTURE: CPT | Performed by: INTERNAL MEDICINE

## 2025-01-07 PROCEDURE — 83605 ASSAY OF LACTIC ACID: CPT | Performed by: NURSE PRACTITIONER

## 2025-01-07 PROCEDURE — 96367 TX/PROPH/DG ADDL SEQ IV INF: CPT

## 2025-01-07 PROCEDURE — 250N000011 HC RX IP 250 OP 636: Performed by: NURSE PRACTITIONER

## 2025-01-07 PROCEDURE — 85027 COMPLETE CBC AUTOMATED: CPT | Performed by: NURSE PRACTITIONER

## 2025-01-07 PROCEDURE — 83735 ASSAY OF MAGNESIUM: CPT | Performed by: NURSE PRACTITIONER

## 2025-01-07 PROCEDURE — 80048 BASIC METABOLIC PNL TOTAL CA: CPT | Performed by: NURSE PRACTITIONER

## 2025-01-07 RX ORDER — IOPAMIDOL 755 MG/ML
90 INJECTION, SOLUTION INTRAVASCULAR ONCE
Status: COMPLETED | OUTPATIENT
Start: 2025-01-07 | End: 2025-01-07

## 2025-01-07 RX ORDER — VANCOMYCIN 2 GRAM/500 ML IN 0.9 % SODIUM CHLORIDE INTRAVENOUS
2000 ONCE
Status: COMPLETED | OUTPATIENT
Start: 2025-01-07 | End: 2025-01-08

## 2025-01-07 RX ORDER — PIPERACILLIN SODIUM, TAZOBACTAM SODIUM 4; .5 G/20ML; G/20ML
4.5 INJECTION, POWDER, LYOPHILIZED, FOR SOLUTION INTRAVENOUS ONCE
Status: COMPLETED | OUTPATIENT
Start: 2025-01-07 | End: 2025-01-07

## 2025-01-07 RX ADMIN — IOPAMIDOL 90 ML: 755 INJECTION, SOLUTION INTRAVENOUS at 23:29

## 2025-01-07 RX ADMIN — PIPERACILLIN AND TAZOBACTAM 4.5 G: 4; .5 INJECTION, POWDER, FOR SOLUTION INTRAVENOUS at 23:01

## 2025-01-07 RX ADMIN — Medication 2000 MG: at 23:56

## 2025-01-07 ASSESSMENT — ENCOUNTER SYMPTOMS
ALLERGIC/IMMUNOLOGIC NEGATIVE: 1
HEMATOLOGIC/LYMPHATIC NEGATIVE: 1
CARDIOVASCULAR NEGATIVE: 1
EYES NEGATIVE: 1
ENDOCRINE NEGATIVE: 1
NUMBNESS: 1
PSYCHIATRIC NEGATIVE: 1
CONSTITUTIONAL NEGATIVE: 1
WOUND: 1
GASTROINTESTINAL NEGATIVE: 1
RESPIRATORY NEGATIVE: 1

## 2025-01-07 ASSESSMENT — COLUMBIA-SUICIDE SEVERITY RATING SCALE - C-SSRS
6. HAVE YOU EVER DONE ANYTHING, STARTED TO DO ANYTHING, OR PREPARED TO DO ANYTHING TO END YOUR LIFE?: NO
1. IN THE PAST MONTH, HAVE YOU WISHED YOU WERE DEAD OR WISHED YOU COULD GO TO SLEEP AND NOT WAKE UP?: NO
2. HAVE YOU ACTUALLY HAD ANY THOUGHTS OF KILLING YOURSELF IN THE PAST MONTH?: NO

## 2025-01-07 ASSESSMENT — ACTIVITIES OF DAILY LIVING (ADL)
ADLS_ACUITY_SCORE: 46
ADLS_ACUITY_SCORE: 46

## 2025-01-08 VITALS
HEART RATE: 71 BPM | BODY MASS INDEX: 33.43 KG/M2 | SYSTOLIC BLOOD PRESSURE: 124 MMHG | WEIGHT: 246.47 LBS | OXYGEN SATURATION: 100 % | DIASTOLIC BLOOD PRESSURE: 71 MMHG | RESPIRATION RATE: 18 BRPM | TEMPERATURE: 97.9 F

## 2025-01-08 LAB
CRP SERPL-MCNC: 276.02 MG/L
ERYTHROCYTE [SEDIMENTATION RATE] IN BLOOD BY WESTERGREN METHOD: 70 MM/HR (ref 0–20)
HOLD SPECIMEN: NORMAL
HOLD SPECIMEN: NORMAL

## 2025-01-08 PROCEDURE — 258N000003 HC RX IP 258 OP 636: Performed by: INTERNAL MEDICINE

## 2025-01-08 PROCEDURE — 250N000011 HC RX IP 250 OP 636: Performed by: INTERNAL MEDICINE

## 2025-01-08 PROCEDURE — 250N000013 HC RX MED GY IP 250 OP 250 PS 637: Performed by: INTERNAL MEDICINE

## 2025-01-08 PROCEDURE — 96361 HYDRATE IV INFUSION ADD-ON: CPT

## 2025-01-08 PROCEDURE — 96366 THER/PROPH/DIAG IV INF ADDON: CPT

## 2025-01-08 RX ORDER — PIPERACILLIN SODIUM, TAZOBACTAM SODIUM 4; .5 G/20ML; G/20ML
4.5 INJECTION, POWDER, LYOPHILIZED, FOR SOLUTION INTRAVENOUS EVERY 6 HOURS
Status: DISCONTINUED | OUTPATIENT
Start: 2025-01-08 | End: 2025-01-08 | Stop reason: HOSPADM

## 2025-01-08 RX ORDER — ACETAMINOPHEN 325 MG/1
975 TABLET ORAL ONCE
Status: COMPLETED | OUTPATIENT
Start: 2025-01-08 | End: 2025-01-08

## 2025-01-08 RX ADMIN — ACETAMINOPHEN 975 MG: 325 TABLET, FILM COATED ORAL at 01:01

## 2025-01-08 RX ADMIN — SODIUM CHLORIDE 1000 ML: 9 INJECTION, SOLUTION INTRAVENOUS at 00:50

## 2025-01-08 RX ADMIN — PIPERACILLIN AND TAZOBACTAM 4.5 G: 4; .5 INJECTION, POWDER, FOR SOLUTION INTRAVENOUS at 06:08

## 2025-01-08 RX ADMIN — SODIUM CHLORIDE 1000 ML: 9 INJECTION, SOLUTION INTRAVENOUS at 03:48

## 2025-01-08 ASSESSMENT — ACTIVITIES OF DAILY LIVING (ADL)
ADLS_ACUITY_SCORE: 46

## 2025-01-08 NOTE — ED TRIAGE NOTES
Arrived by [facility]. Cramping in right leg, and numbness in bilateral LE's. Takes PO Neurontin BID. Redness on RLE started 3 weeks ago.

## 2025-01-08 NOTE — ED PROVIDER NOTES
History     Chief Complaint   Patient presents with    Leg Pain     HPI  Aldo Castillo is a 64 year old individual with DMT2, coronary atherosclerosis, hypertension, GERD, peripheral artery disease, stage III chronic kidney disease, peripheral neuropathy, comes in via EMS for lower extremity pain and numbness.  Patient states that redness started in the right lower extremity about 3 weeks ago.  This continued and now he is starting to have numbness from the knees down and pain that is more than usual.  Patient states he took his Neurontin with no improvement so called EMS.  Patient has amputations to bilateral feet which he sees wound care.  Did not go to recent appointment.  No reported fever or chills.  No chest pain or shortness of breath.    Allergies:  No Known Allergies    Problem List:    Patient Active Problem List    Diagnosis Date Noted    Coronary atherosclerosis 10/11/2024     Priority: Medium    Stage 3b chronic kidney disease (H) 12/11/2023     Priority: Medium    PAD (peripheral artery disease) 11/01/2021     Priority: Medium     Angiography and cannulation on 10/29/2021 for an occluded posterior tibial artery proximally, occluded right proximal anterior tibial artery and diffuse stenosis of the right anterior tibial artery.    Last Assessment & Plan:    He appears to be optimal medical management of this with dual antiplatelet therapy for now as well as statin drug, he will need to continue to discuss with vascular surgery as far as how long to continue dual antiplatelet therapy in light of this   stenting.      Peripheral neuropathy 11/01/2021     Priority: Medium     Follows w/ podiatry, prior in [facility] since spring 2022  Due to diabetes, EMG 7/2022 confirms  Meds:  - 3/2022: Gabapentin 100 mg TID, Magnesium remotely (not clear deficiency)  - 7/14/22: Duloxetine 60 mg XR Daily    Last Assessment & Plan:    We reviewed the diagnosis and management of peripheral  neuropathy as it relates to diabetes which is the cause of this based on his recent EMG.  Reviewed that generally not reversible but we can prevent progression with optimal control of diabetes.  He   previously on gabapentin but felt this caused sedation and no clear benefit.  Reviewed that we cannot expect this completely go away but were trying to reduce bothersome symptoms with pharmacotherapy.  We will try duloxetine towards this end.      History of amputation of hallux 01/31/2021     Priority: Medium     Secondary to osteomyelitis x2 with repeat amputation.      Gastroesophageal reflux disease 01/06/2021     Priority: Medium     Started on PPI omeprazole March 2020, not on previously. 1/6/2021 trying step down therapy to Pepcid.   7/16/2021 Using Pepcid daily PRN.  Typically twice a month for breakthrough heartburn, well controlled.  Wondering about gastroparesis per online research 7/2022  Establish SHALOM 7/2022, eval pending    Last Assessment & Plan:    He will discussed the symptoms and evaluation of this with my colleagues further at his upcoming visit today.  Famotidine refilled for now.      Sciatic leg pain 06/22/2018     Priority: Medium    Essential hypertension 06/22/2018     Priority: Medium    Degenerative joint disease (DJD) of hip 06/21/2018     Priority: Medium     Overview:   Right      Hyperlipidemia LDL goal <100 06/21/2018     Priority: Medium    S/P total hip arthroplasty, right 06/21/2018     Priority: Medium    Type 2 diabetes mellitus with hyperglycemia, without long-term current use of insulin (H) 10/27/2017     Priority: Medium     Hemoglobin A1c 6.9 on 6/8/2018.      Morbid obesity (H) 06/22/2017     Priority: Medium        Past Medical History:    Past Medical History:   Diagnosis Date    Diabetes (H)        Past Surgical History:    Past Surgical History:   Procedure Laterality Date    ARTHROPLASTY HIP Right 6/21/2018    Procedure: ARTHROPLASTY HIP;  Right Total Hip Arthroplasty;   Surgeon: Dain Lawrence MD;  Location: [facility] OR    KNEE SURGERY Right        Family History:    Family History   Problem Relation Age of Onset    Cancer Mother     Diabetes No family hx of     Coronary Artery Disease No family hx of     Hypertension No family hx of     Hyperlipidemia No family hx of     Breast Cancer No family hx of     Colon Cancer No family hx of     Prostate Cancer No family hx of        Social History:  Marital Status:   [4]  Social History     Tobacco Use    Smoking status: Former     Types: Cigars     Start date: 1978     Quit date: 8/3/2020     Years since quittin.4    Smokeless tobacco: Former     Quit date: 2019   Substance Use Topics    Alcohol use: No    Drug use: No        Medications:    amitriptyline (ELAVIL) 50 MG tablet  Blood Glucose Monitoring Suppl (BLOOD GLUCOSE MONITOR SYSTEM) W/DEVICE KIT  CLINDAMYCIN PALMITATE HCL PO  lisinopril-hydrochlorothiazide (ZESTORETIC) 20-12.5 MG tablet  metFORMIN (GLUCOPHAGE) 1000 MG tablet  omeprazole (PRILOSEC) 40 MG DR capsule  simvastatin (ZOCOR) 20 MG tablet  vitamin D2 (ERGOCALCIFEROL) 01940 units (1250 mcg) capsule          Review of Systems   Constitutional: Negative.    HENT: Negative.     Eyes: Negative.    Respiratory: Negative.     Cardiovascular: Negative.    Gastrointestinal: Negative.    Endocrine: Negative.    Genitourinary: Negative.    Musculoskeletal:         Bilateral lower extremity pain   Skin:  Positive for wound (Amputations to toes of bilateral feet.).   Allergic/Immunologic: Negative.    Neurological:  Positive for numbness (Lower extremities).   Hematological: Negative.    Psychiatric/Behavioral: Negative.         Physical Exam   BP: 145/81  Pulse: 110  Temp: (!) 102  F (38.9  C)  Resp: 18  Weight: 111.8 kg (246 lb 7.6 oz)  SpO2: 99 %      GENERAL APPEARANCE:  The patient is a 64 year old well-developed, well-nourished individual that appears as stated age.  LUNGS:  Breathing is easy.  Breath  sounds are equal and clear bilaterally.  No wheezes, rhonchi, or rales.  HEART: Tachycardic rate with regular rhythm with normal S1 and S2.  No murmurs, gallops, or rubs.  EXTREMITIES: Swelling to right lower extremity from knee down is present with erythema and increased warmth.  Pedal and post-tibial pulses are 2+ bilaterally.    MUSCULOSKELETAL: No obvious tenderness to palpation to bilateral lower extremities.  No crepitus.  Amputation to multiple toes to the right foot present.  Left great toe amputation present.  NEUROLOGIC: Decreased sensation to bilateral lower extremities is present which apparently is chronic.  PSYCHIATRIC:  The patient is awake, alert, and oriented x4.  Recent and remote memory is intact.  Appropriate mood and affect.  Calm and cooperative with history and physical exam.  SKIN: Open wound with scabbing to left great toe.  No surrounding erythema or toxic striations.  Open wound to end of right foot with thick, tan and, odorous discharge.  Surrounding erythema with increased warmth present.        ED Course     ED Course as of 01/07/25 2258 Tue Jan 07, 2025 2155 Labs ordered.   2156 In to see patient and history/physical completed.    2205 CT of right foot ordered to rule out osteomyelitis.   2208 Vancomycin and Zosyn ordered for diabetic wound infection treatment.   2256 Awaiting rest of lab results and CT of the right lower extremity.  Will handoff to oncoming shift for lab interpretation and CT for patient disposition.                   Results for orders placed or performed during the hospital encounter of 01/07/25 (from the past 24 hours)   CBC with platelets   Result Value Ref Range    WBC Count 11.6 (H) 4.0 - 11.0 10e3/uL    RBC Count 4.75 4.40 - 5.90 10e6/uL    Hemoglobin 13.2 (L) 13.3 - 17.7 g/dL    Hematocrit 40.0 40.0 - 53.0 %    MCV 84 78 - 100 fL    MCH 27.8 26.5 - 33.0 pg    MCHC 33.0 31.5 - 36.5 g/dL    RDW 15.9 (H) 10.0 - 15.0 %    Platelet Count 279 150 - 450 10e3/uL    Lactic acid whole blood with 1x repeat in 2 hr when >2   Result Value Ref Range    Lactic Acid, Initial 1.5 0.7 - 2.0 mmol/L   Extra Tube    Narrative    The following orders were created for panel order Extra Tube.  Procedure                               Abnormality         Status                     ---------                               -----------         ------                     Extra Blue Top Tube[[id number]]                              In process                 Extra Red Top Tube[[id number]]                               In process                   Please view results for these tests on the individual orders.       Medications   piperacillin-tazobactam (ZOSYN) 4.5 g vial to attach to  mL bag (has no administration in time range)   Vancomycin (VANCOCIN) 2,000 mg in 0.9% NaCl 500 mL intermittent infusion (has no administration in time range)   iopamidol (ISOVUE-370) solution 90 mL (has no administration in time range)   sodium chloride (PF) 0.9% PF flush 60 mL (has no administration in time range)       Assessments & Plan (with Medical Decision Making)     I have reviewed the nursing notes.    I have reviewed the findings, diagnosis, plan and need for follow up with the patient.    Summary:  Patient presents to the ER today for lower extremity pain.  Potential diagnosis which have been considered and evaluated include cellulitis, DVT, electrolyte abnormality, osteomyelitis, compartment syndrome, diabetic neuropathy, as well as others. Many of these have been excluded using the various modalities and assessment as noted on the chart. At the present time, the diagnosis given seems to be the most likely diabetic foot wound with cellulitis.  Upon arrival, vitals signs show blood pressure 145/81 with a pulse of 110.  Temperature of 102  F.  Respirations 18 with oxygenation 99% on room air.  The patient is alert and oriented no distress.  Patient does have tachycardic heart rate.  Respiratory examination  normal.  Swelling and erythema present to the right lower extremity from the knee down to the foot with noted increased warmth.  Does have diabetic toe amputations on bilateral feet.  Left great toe amputation dressing shows scabbed incision with no surrounding erythema or discharge.  Right foot with amputation of great toe, second and third toes.  There is 1 cm circular ulcer on the bottom of the foot.  Odorous, tan, thick drainage from this area is noted.  Wound culture was obtained.  Pulses are intact.  With these findings did initiate sepsis workup.  Labs show WBC of 11.6 with hemoglobin of 13.2.  Lactic acid 1.5.  Rest of lab work is pending.  CT of right foot with IV contrast was ordered and awaiting to be conducted.  Vancomycin 2 g and Zosyn 4.5 g given.  Patient will be handed off to oncoming shift for evaluation of labs and CT and disposition of patient.      Critical Care Time: None    Impression and plan discussed with patient. Questions answered, concerns addressed, indications for urgent re-evaluation reviewed, and  given. Patient/Parent/Caregiver agree with treatment plan and have no further questions at this time.      This document was prepared using a combination of typing and voice generated software.  While every attempt was made for accuracy, spelling and grammatical errors may exist.              New Prescriptions    No medications on file       Final diagnoses:   Diabetic foot ulcer (H)   Cellulitis of right lower extremity       1/7/2025   [facility]       Florentino Nunes APRN CNP  01/07/25 [id number]

## 2025-01-08 NOTE — ED NOTES
Patient transferred to [facility] via [facility]. VSS and charted. Denies pain. PIV 18g in RAC remains CDI and SL. Nurse to nurse given to SELINA Lawrence. Stat doc notified by ERT.

## 2025-01-08 NOTE — DISCHARGE INSTRUCTIONS

## 2025-01-08 NOTE — PHARMACY-VANCOMYCIN DOSING SERVICE
Pharmacy consulted to dose vancomycin one time in the ED. 2000 mg (~18 mg/kg x 111 kg) x 1 dose ordered. If patient is admitted and further therapy is desired, hospitalist please order consult for pharmacy to dose vancomycin.

## 2025-01-09 LAB
BACTERIA BLD CULT: NORMAL
BACTERIA BLD CULT: NORMAL
BACTERIA WND CULT: ABNORMAL
BACTERIA WND CULT: ABNORMAL
GRAM STAIN RESULT: ABNORMAL

## 2025-01-13 LAB
BACTERIA BLD CULT: NO GROWTH
BACTERIA BLD CULT: NO GROWTH

## 2025-03-01 ENCOUNTER — HOSPITAL ENCOUNTER (EMERGENCY)
Facility: HOSPITAL | Age: 65
Discharge: OTHER INSTITUTION | End: 2025-03-01
Attending: NURSE PRACTITIONER
Payer: COMMERCIAL

## 2025-03-01 ENCOUNTER — APPOINTMENT (OUTPATIENT)
Dept: GENERAL RADIOLOGY | Facility: HOSPITAL | Age: 65
End: 2025-03-01
Attending: NURSE PRACTITIONER
Payer: COMMERCIAL

## 2025-03-01 VITALS
OXYGEN SATURATION: 96 % | BODY MASS INDEX: 31.28 KG/M2 | DIASTOLIC BLOOD PRESSURE: 64 MMHG | WEIGHT: 230.6 LBS | RESPIRATION RATE: 14 BRPM | SYSTOLIC BLOOD PRESSURE: 160 MMHG | TEMPERATURE: 97.5 F | HEART RATE: 71 BPM
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
## 2025-03-01 DIAGNOSIS — T81.31XA POSTOPERATIVE WOUND DEHISCENCE, INITIAL ENCOUNTER: ICD-10-CM
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
## 2025-03-01 LAB
ANION GAP SERPL CALCULATED.3IONS-SCNC: 14 MMOL/L (ref 7–15)
BUN SERPL-MCNC: 27.6 MG/DL (ref 8–23)
CALCIUM SERPL-MCNC: 9.7 MG/DL (ref 8.8–10.4)
CHLORIDE SERPL-SCNC: 103 MMOL/L (ref 98–107)
CREAT SERPL-MCNC: 1.32 MG/DL (ref 0.67–1.17)
EGFRCR SERPLBLD CKD-EPI 2021: 61 ML/MIN/1.73M2
ERYTHROCYTE [DISTWIDTH] IN BLOOD BY AUTOMATED COUNT: 17.2 % (ref 10–15)
GLUCOSE SERPL-MCNC: 120 MG/DL (ref 70–99)
HCO3 SERPL-SCNC: 24 MMOL/L (ref 22–29)
HCT VFR BLD AUTO: 35.2 % (ref 40–53)
HGB BLD-MCNC: 11.6 G/DL (ref 13.3–17.7)
MCH RBC QN AUTO: 27.2 PG (ref 26.5–33)
MCHC RBC AUTO-ENTMCNC: 33 G/DL (ref 31.5–36.5)
MCV RBC AUTO: 82 FL (ref 78–100)
PLATELET # BLD AUTO: 199 10E3/UL (ref 150–450)
POTASSIUM SERPL-SCNC: 4.9 MMOL/L (ref 3.4–5.3)
RBC # BLD AUTO: 4.27 10E6/UL (ref 4.4–5.9)
SODIUM SERPL-SCNC: 141 MMOL/L (ref 135–145)
WBC # BLD AUTO: 5.2 10E3/UL (ref 4–11)

## 2025-03-01 PROCEDURE — 85048 AUTOMATED LEUKOCYTE COUNT: CPT | Performed by: NURSE PRACTITIONER

## 2025-03-01 PROCEDURE — 36415 COLL VENOUS BLD VENIPUNCTURE: CPT | Performed by: NURSE PRACTITIONER

## 2025-03-01 PROCEDURE — 85014 HEMATOCRIT: CPT | Performed by: NURSE PRACTITIONER

## 2025-03-01 PROCEDURE — 73590 X-RAY EXAM OF LOWER LEG: CPT | Mod: RT

## 2025-03-01 PROCEDURE — 99283 EMERGENCY DEPT VISIT LOW MDM: CPT | Performed by: SURGERY

## 2025-03-01 PROCEDURE — 82374 ASSAY BLOOD CARBON DIOXIDE: CPT | Performed by: NURSE PRACTITIONER

## 2025-03-01 PROCEDURE — 99285 EMERGENCY DEPT VISIT HI MDM: CPT | Mod: 25

## 2025-03-01 PROCEDURE — 250N000011 HC RX IP 250 OP 636: Performed by: NURSE PRACTITIONER

## 2025-03-01 PROCEDURE — 99285 EMERGENCY DEPT VISIT HI MDM: CPT | Performed by: NURSE PRACTITIONER

## 2025-03-01 PROCEDURE — 96365 THER/PROPH/DIAG IV INF INIT: CPT

## 2025-03-01 PROCEDURE — 80048 BASIC METABOLIC PNL TOTAL CA: CPT | Performed by: NURSE PRACTITIONER

## 2025-03-01 RX ORDER — CEFAZOLIN SODIUM 2 G/100ML
2 INJECTION, SOLUTION INTRAVENOUS ONCE
Status: COMPLETED | OUTPATIENT
Start: 2025-03-01 | End: 2025-03-01

## 2025-03-01 RX ADMIN — CEFAZOLIN SODIUM 2 G: 2 INJECTION, SOLUTION INTRAVENOUS at 12:33

## 2025-03-01 ASSESSMENT — ENCOUNTER SYMPTOMS
GASTROINTESTINAL NEGATIVE: 1
PSYCHIATRIC NEGATIVE: 1
ALLERGIC/IMMUNOLOGIC NEGATIVE: 1
CARDIOVASCULAR NEGATIVE: 1
NEUROLOGICAL NEGATIVE: 1
RESPIRATORY NEGATIVE: 1
HEMATOLOGIC/LYMPHATIC NEGATIVE: 1
WOUND: 1
CONSTITUTIONAL NEGATIVE: 1
EYES NEGATIVE: 1
ENDOCRINE NEGATIVE: 1

## 2025-03-01 ASSESSMENT — COLUMBIA-SUICIDE SEVERITY RATING SCALE - C-SSRS
1. IN THE PAST MONTH, HAVE YOU WISHED YOU WERE DEAD OR WISHED YOU COULD GO TO SLEEP AND NOT WAKE UP?: NO
2. HAVE YOU ACTUALLY HAD ANY THOUGHTS OF KILLING YOURSELF IN THE PAST MONTH?: NO
6. HAVE YOU EVER DONE ANYTHING, STARTED TO DO ANYTHING, OR PREPARED TO DO ANYTHING TO END YOUR LIFE?: NO

## 2025-03-01 ASSESSMENT — ACTIVITIES OF DAILY LIVING (ADL)
ADLS_ACUITY_SCORE: 46
ADLS_ACUITY_SCORE: 46

## 2025-03-01 NOTE — ED PROVIDER NOTES
History     Chief Complaint   Patient presents with    Wound Dehiscence     HPI  Aldo Castillo is a 64 year old individual with history of DMT2, hypertension, coronary atherosclerosis, GERD, peripheral artery disease, peripheral neuropathy, stage III chronic kidney disease, comes in via EMS after a fall with bleeding from recent right below the knee amputation.  Patient states just had sutures removed yesterday.  Was just transferred home yesterday from short-term rehab and nursing home.  Got up today and was unsteady and fell onto his butt.  No head injury with this.  Started to get pain in his right stump on his leg and bleeding.  Called EMS for this reason.  Patient currently denies any dizziness, lightheadedness, neck pain, back pain, thorax injury, pelvic pain.  States that the pain is improved in the right lower extremity.  Does have bleeding through bandages that were not removed at home.    Allergies:  No Known Allergies    Problem List:    Patient Active Problem List    Diagnosis Date Noted    Coronary atherosclerosis 10/11/2024     Priority: Medium    Stage 3b chronic kidney disease (H) 12/11/2023     Priority: Medium    PAD (peripheral artery disease) 11/01/2021     Priority: Medium     Angiography and cannulation on 10/29/2021 for an occluded posterior tibial artery proximally, occluded right proximal anterior tibial artery and diffuse stenosis of the right anterior tibial artery.    Last Assessment & Plan:    He appears to be optimal medical management of this with dual antiplatelet therapy for now as well as statin drug, he will need to continue to discuss with vascular surgery as far as how long to continue dual antiplatelet therapy in light of this   stenting.      Peripheral neuropathy 11/01/2021     Priority: Medium     Follow[facility] w[facility] podiatry[facility]  Due to diabetes, EMG 7/2022 confirms  Meds:  - 3/2022: Gabapentin 100 mg TID, Magnesium  remotely (not clear deficiency)  - 7/14/22: Duloxetine 60 mg XR Daily    Last Assessment & Plan:    We reviewed the diagnosis and management of peripheral neuropathy as it relates to diabetes which is the cause of this based on his recent EMG.  Reviewed that generally not reversible but we can prevent progression with optimal control of diabetes.  He   previously on gabapentin but felt this caused sedation and no clear benefit.  Reviewed that we cannot expect this completely go away but were trying to reduce bothersome symptoms with pharmacotherapy.  We will try duloxetine towards this end.      History of amputation of hallux 01/31/2021     Priority: Medium     Secondary to osteomyelitis x2 with repeat amputation.      Gastroesophageal reflux disease 01/06/2021     Priority: Medium     Started on PPI omeprazole March 2020, not on previously. 1/6/2021 trying step down therapy to Pepcid.   7/16/2021 Using Pepcid daily PRN.  Typically twice a month for breakthrough heartburn, well controlled.  Wondering about gastroparesis per online research 7/2022  Establish SHALOM 7/2022, susanne pending    Last Assessment & Plan:    He will discussed the symptoms and evaluation of this with my colleagues further at his upcoming visit today.  Famotidine refilled for now.      Sciatic leg pain 06/22/2018     Priority: Medium    Essential hypertension 06/22/2018     Priority: Medium    Degenerative joint disease (DJD) of hip 06/21/2018     Priority: Medium     Overview:   Right      Hyperlipidemia LDL goal <100 06/21/2018     Priority: Medium    S/P total hip arthroplasty, right 06/21/2018     Priority: Medium    Type 2 diabetes mellitus with hyperglycemia, without long-term current use of insulin (H) 10/27/2017     Priority: Medium     Hemoglobin A1c 6.9 on 6/8/2018.      Morbid obesity (H) 06/22/2017     Priority: Medium        Past Medical History:    Past Medical History:   Diagnosis Date    Diabetes (H)        Past Surgical History:     Past Surgical History:   Procedure Laterality Date    ARTHROPLASTY HIP Right 2018    Procedure: ARTHROPLASTY HIP;  Right Total Hip Arthroplasty;  Surgeon: Dain Lawrence MD;  Location: [facility] OR    KNEE SURGERY Right        Family History:    Family History   Problem Relation Age of Onset    Cancer Mother     Diabetes No family hx of     Coronary Artery Disease No family hx of     Hypertension No family hx of     Hyperlipidemia No family hx of     Breast Cancer No family hx of     Colon Cancer No family hx of     Prostate Cancer No family hx of        Social History:  Marital Status:   [4]  Social History     Tobacco Use    Smoking status: Former     Types: Cigars     Start date: 1978     Quit date: 8/3/2020     Years since quittin.5    Smokeless tobacco: Former     Quit date: 2019   Substance Use Topics    Alcohol use: No    Drug use: No        Medications:    Blood Glucose Monitoring Suppl (BLOOD GLUCOSE MONITOR SYSTEM) W/DEVICE KIT  lisinopril-hydrochlorothiazide (ZESTORETIC) 20-12.5 MG tablet  metFORMIN (GLUCOPHAGE) 1000 MG tablet  simvastatin (ZOCOR) 20 MG tablet  vitamin D2 (ERGOCALCIFEROL) 68901 units (1250 mcg) capsule          Review of Systems   Constitutional: Negative.    HENT: Negative.     Eyes: Negative.    Respiratory: Negative.     Cardiovascular: Negative.    Gastrointestinal: Negative.    Endocrine: Negative.    Genitourinary: Negative.    Musculoskeletal:         RBKA   Skin:  Positive for wound (Open wound to right below the knee amputation).   Allergic/Immunologic: Negative.    Neurological: Negative.    Hematological: Negative.    Psychiatric/Behavioral: Negative.         Physical Exam   BP: (!) 160/64  Pulse: 71  Temp: 97.5  F (36.4  C)  Resp: 14  Weight: 104.6 kg (230 lb 9.6 oz)  SpO2: 99 %      GENERAL APPEARANCE:  The patient is a 64 year old well-developed, well-nourished individual that appears as stated age.  HEENT:  Normocephalic.  No soft spots,  indentations, tenderness noted upon palpation of the scalp or face.  No crepitus or deformities noted to face or scalp.  Pupils are equal, round, and reactive to light.  Oropharynx is clear.  No avulsed teeth, buccal or tongue lacerations present.  Voice is clear and without muffling.   No otorrhea or rhinorrhea present.  Negative wayne sign.  Negative for hemotympanum bilaterally.  NECK:  Supple.  Trachea is midline.  No carotid bruits present on auscultation.  CHEST:  Symmetric.  Non-tender to palpation.  No crepitus or deformity.  LUNGS:  Breathing is easy.  Breath sounds are equal and clear bilaterally.  No wheezes, rhonchi, or rales.  HEART:  Regular rate and rhythm with normal S1 and S2.  No murmurs, gallops, or rubs.  ABDOMEN:  Soft.  No mass, tenderness, guarding, or rebound.  No organomegaly or hernia.  Bowel sounds are present.  No CVA tenderness or flank mass.  No abdominal bruits or thrills present upon auscultation/palpation.  Negative Reinaldo sign.  Negative Witt Montgomery sign.  EXTREMITIES: Left lower extremity with amputation of great toe that is healed.  No crepitus or deformities.  Right below the knee amputation does have wound dehiscence.  No significant erythema.  Bleeding controlled.  No obvious bone present on evaluation.  MUSCULOSKELETAL:  No cervical/thoracic/lumbar spinal tenderness, step-offs, deformities noted to palpation.  No paraspinal tenderness noted to palpation.  Pelvis stable upon palpation.  No crepitus, deformities, tenderness noted to palpation to the upper or lower extremities to palpation.   MENTAL STATUS:   The patient was alert and oriented to person, place, time and purpose. Registration and recall intact. No difficulty with concentration.  Spontaneous eye opening.  Follows commands.  GCS 15.   SENSORY:  Sensation intact.  PSYCHIATRIC:  Appropriate mood and affect.  Calm and cooperative with history and physical exam.  SKIN:  Warm, dry, and well perfused.  Good turgor.  10  "cm long wound dehiscence to the anterior surface of the right below the knee amputation site.  Is gaping at 3 cm.  Bleeding is controlled at this time.  No erythema or toxic striations.  No obvious bone tissue present on visualization.             ED Course     ED Course as of 03/01/25 1313   Sat Mar 01, 2025   1100 In to see patient and history/physical completed.    1110 Patient had foot amputation at [facility] but then was transferred to [facility] in [place] where patient developed osteomyelitis and right below the knee amputation was conducted by Dr. Colon.   1142 Discussed case with general surgeon Dr. Aguilar at [facility] in [place].  States patient needs to have this washed out in surgery and repaired.  Start antibiotics.  Okay for local surgeon to do otherwise patient can be transferred.   1143 Ancef 2 g IV ordered.   1201 General surgeon Dr. Lee did come and see patient.  Patient opted to have closure done in [place].     1208 Wet dressing placed to wound.   1210 General surgeon Dr. Lee concerned that patient out of short-term facility for 1 day and already fell.  Patient states that \"he does not have anybody to care for him\".  States that his brother who lives with them \"is in worse shape than he is\".   1232 Our general did discuss this with general surgeon Dr. Aguilar at [facility].  Patient accepted for transfer.  This provider also did discuss case with hospitalist Dr. Contreras who accepted patient for transfer.                 Results for orders placed or performed during the hospital encounter of 03/01/25 (from the past 24 hours)   CBC with platelets   Result Value Ref Range    WBC Count 5.2 4.0 - 11.0 10e3/uL    RBC Count 4.27 (L) 4.40 - 5.90 10e6/uL    Hemoglobin 11.6 (L) 13.3 - 17.7 g/dL    Hematocrit 35.2 (L) 40.0 - 53.0 %    MCV 82 78 - 100 fL    MCH 27.2 26.5 - 33.0 pg    MCHC 33.0 31.5 - 36.5 g/dL    RDW 17.2 (H) 10.0 - 15.0 %    Platelet Count 199 150 - " 450 10e3/uL   Basic metabolic panel   Result Value Ref Range    Sodium 141 135 - 145 mmol/L    Potassium 4.9 3.4 - 5.3 mmol/L    Chloride 103 98 - 107 mmol/L    Carbon Dioxide (CO2) 24 22 - 29 mmol/L    Anion Gap 14 7 - 15 mmol/L    Urea Nitrogen 27.6 (H) 8.0 - 23.0 mg/dL    Creatinine 1.32 (H) 0.67 - 1.17 mg/dL    GFR Estimate 61 >60 mL/min/1.73m2    Calcium 9.7 8.8 - 10.4 mg/dL    Glucose 120 (H) 70 - 99 mg/dL   XR Tibia and Fibula Right 2 Views    Narrative    EXAM: XR TIBIA AND FIBULA RIGHT 2 VIEWS  LOCATION: [facility]  DATE: 3/1/2025    INDICATION: Right below the knee amputation. Concern for foreign body.  COMPARISON: None.      Impression    IMPRESSION: Postop right below the knee amputation. Deep wound or ulcer at the amputation stump anteroinferior which extends nearly down to the tibial osteotomy. Amputation stump soft tissue swelling. Nothing definitive for osteomyelitis at the   amputation margins of the tibia or fibula. Anatomic alignment right knee. No acute displaced fracture. Patellofemoral compartment right knee osteoarthritis without right knee joint effusion. Arterial calcification. No definitive radiopaque soft tissue   foreign body.       Medications   ceFAZolin (ANCEF) 2 g in 100 mL D5W intermittent infusion (2 g Intravenous $New Bag 3/1/25 1233)       Assessments & Plan (with Medical Decision Making)     I have reviewed the nursing notes.    I have reviewed the findings, diagnosis, plan and need for follow up with the patient.    Summary:  Patient presents to the ER today for fall with wound dehiscence to the right lower extremity.  Potential diagnosis which have been considered and evaluated include infection, fracture, foreign body as well as others. Many of these have been excluded using the various modalities and assessment as noted on the chart. At the present time, the diagnosis given seems to be the most likely wound dehiscence due to trauma.  Upon arrival, vitals signs are  normal.  The patient is alert and oriented.  Trauma examination shows no head, spinal, thorax, upper extremity injury.  No injuries noted to left lower extremity.  Does have bleeding through Ace wrap to his right lower extremity stump.  This was unwrapped and shows wound dehiscence measuring 10 cm and gaping 3 cm.  Bleeding is controlled at this time.  No obvious bone material showing.  Pain under control at this time.  Wound covered with normal saline wet gauze and wrapped with Kerlix.  Basic lab work obtained showing WBC of 5.2 with hemoglobin 11.6.  Electrolytes normal.  Does have a BUN of 27.6 with a creatinine of 1.32 and a GFR of 61 which appears to be at baseline looking at old lab work.  X-ray of right lower extremity personally reviewed showing no obvious foreign body.  Looking at old medical records, patient had amputation of the foot done in at [facility] on 1/8/2025 by podiatrist Dr. Cheyanne Stroud.  Patient had follow-up at [facility] in [place] where it was noted patient bleeding and drainage through dressings of his foot with gangrenous changes.  Wound culture did grow group see strep.  Patient had a right below the knee amputation on 1/29/2025 by Dr. Wall..  Blood cultures did grow MRSA so vancomycin was added to his antibiotic regimen.  Patient was sent to short-term rehab in a SNF.  Patient states that he had sutures removed yesterday and left the SNF and went back home.  Fell due to weakness and came back to the ER via EMS.  Discussed this case with general surgeon Dr. Aguilar at [facility].  Okay to repair locally or can transfer patient.  Recommended starting antibiotics and covering wound with wet dressing which had already been done with normal saline infused.  Ancef 2 g IV given.  Did discuss case with local general surgeon Dr. Lee did come and see patient.  Due to social circumstances, it was opted that patient be transferred back to [facility].  General  surgeon Dr. Aguilar did accept patient for transfer.  Discussed case with Hospitalist Dr. Contreras who also accepted patient for transfer.        Critical Care Time: None    Impression and plan discussed with patient. Questions answered, concerns addressed, indications for urgent re-evaluation reviewed, and  given. Patient/Parent/Caregiver agree with treatment plan and have no further questions at this time.      This document was prepared using a combination of typing and voice generated software.  While every attempt was made for accuracy, spelling and grammatical errors may exist.              New Prescriptions    No medications on file       Final diagnoses:   Postoperative wound dehiscence, initial encounter   Fall at home, initial encounter       3/1/2025   [facility]       Florentino Nunes APRN CNP  03/01/25 2388

## 2025-03-01 NOTE — ED TRIAGE NOTES
Pt presents via [facility] with c/o having his right lower leg removed a month ago, stitches removed yesterday, fell today and wound dehisced.

## 2025-03-01 NOTE — CONSULTS
"ER Consult  3/1/2025    Patient:Aldo Castillo  Referring Physician:  Florentino Nunes CNP    Reason for Referral: R BKA sound dehiscence    HPI:  Aldo Castillo is a very pleasant 64 year old male PMH diabetes s/p right BKA one month ago presenting to the emergency room for stump dehiscence.    Patient had his staples removed yesterday.  Yesterday was also his first day back home from rehabilitation.  Unfortunately, he fell this morning which resulted in his BKA wound to dehisce.  He presented to the ER immediately.    He otherwise feels okay.  Does not take blood thinners.  He lives at home alone with his brother in [place].  Patient has only his brother (who he lives with) to help take care of him.  Patient, however, states that his brother \"is in worse shape than me.\"  Patient was initially receiving care at [facility] in [facility], however, his insurance changed and they were no longer in network prompting patient to seek care in [place], [place].  Patient had toast at 8 AM this morning.    Past Medical History:  Past Medical History:   Diagnosis Date    Diabetes (H)        Past Surgical History:  Past Surgical History:   Procedure Laterality Date    ARTHROPLASTY HIP Right 6/21/2018    Procedure: ARTHROPLASTY HIP;  Right Total Hip Arthroplasty;  Surgeon: Dain Lawrence MD;  Location: [facility] OR    KNEE SURGERY Right        Family History History:  Family History   Problem Relation Age of Onset    Cancer Mother     Diabetes No family hx of     Coronary Artery Disease No family hx of     Hypertension No family hx of     Hyperlipidemia No family hx of     Breast Cancer No family hx of     Colon Cancer No family hx of     Prostate Cancer No family hx of        History of Tobacco Use:  History   Smoking Status    Former    Types: Cigars    Start date: 1/1/1978    Quit date: 8/3/2020   Smokeless Tobacco    Former    Quit date: 11/20/2019       Current Medications:  Current Outpatient Medications   Medication Sig " Dispense Refill    Blood Glucose Monitoring Suppl (BLOOD GLUCOSE MONITOR SYSTEM) W/DEVICE KIT 2 times daily       lisinopril-hydrochlorothiazide (ZESTORETIC) 20-12.5 MG tablet Take 1 tablet by mouth daily 90 tablet 1    metFORMIN (GLUCOPHAGE) 1000 MG tablet Take 1 tablet (1,000 mg) by mouth 2 times daily (with meals) 180 tablet 1    simvastatin (ZOCOR) 20 MG tablet Take 1 tablet (20 mg) by mouth daily 90 tablet 1    vitamin D2 (ERGOCALCIFEROL) 21996 units (1250 mcg) capsule Take 50,000 Units by mouth once a week         Allergies:  No Known Allergies    ROS:  Pertinent items are noted in HPI.    PHYSICAL EXAM:     Vital signs: BP (!) 160/64   Pulse 71   Temp 97.5  F (36.4  C) (Tympanic)   Resp 14   Wt 104.6 kg (230 lb 9.6 oz)   SpO2 95%   BMI 31.28 kg/m     Weight: [unfilled]   BMI: Body mass index is 31.28 kg/m .   General: No apparent distress   Skin: No rashes or readily discernible lesions   Neck: Neck supple, symmetric and without palpable adenopathy or masses   Lungs: Nonlabored breathing on room air   CV: Regular rate and rythm   Abdominal: Soft, nontender, nondistended   Extremities: No peripheral edema    Right lower extremity: Obvious wound dehiscence with healthy appearing skin and subcutaneous tissue.  No active bleeding.    Labs:  In process    Imaging:  N/a    ASSESSMENT & Plan:  Aldo Castillo is a very pleasant 64 year old male PMH diabetes s/p right BKA one month ago presenting to the emergency room for stump dehiscence.    Given patient's social situation, I think it would be best for him to be transferred back to [place] for repair and the subsequent disposition management since he is already known to this location with more disposition options compared to what we have locally.  This was discussed with the on call surgeon in [place] and patient was accepted for transfer to their facility.

## (undated) DEVICE — DEVICE RETRIEVER HEWSON 71111579

## (undated) DEVICE — SU ETHIBOND 5 V-37 4X30" MB66G

## (undated) DEVICE — GLOVE PROTEXIS BLUE W/NEU-THERA 6.5  2D73EB65

## (undated) DEVICE — PACK TOTAL HIP W/POUCH RIVERSIDE LATEX FREE

## (undated) DEVICE — SU PDO 1 STRATAFIX 36X36CM CTX TAPERPOINT SXPD2B405

## (undated) DEVICE — SU VICRYL 1 CT-1 36" UND J947H

## (undated) DEVICE — GLOVE PROTEXIS W/NEU-THERA 6.5  2D73TE65

## (undated) DEVICE — ESU ELEC BLADE 4" COATED

## (undated) DEVICE — BLADE SAW SAGITTAL STRK 18X90X1.37MM HD SYS 6 6118-137-090

## (undated) DEVICE — BONE CLEANING TIP INTERPULSE  0210-010-000

## (undated) DEVICE — PREP DURAPREP 26ML APL 8630

## (undated) DEVICE — SOL WATER IRRIG 1000ML BOTTLE 07139-09

## (undated) DEVICE — SYR BULB IRRIG 50ML LATEX FREE 0035280

## (undated) DEVICE — DRSG AQUACEL AG 3.5X9.75" HYDROFIBER 412011

## (undated) DEVICE — GOWN IMPERVIOUS SPECIALTY XLG/XLONG 32474

## (undated) DEVICE — SUCTION IRR SYSTEM W/TIP INTERPULSE

## (undated) DEVICE — SU MONOCRYL 3-0 PS-2 18" UND Y497G

## (undated) DEVICE — GLOVE PROTEXIS W/NEU-THERA 8.0  2D73TE80

## (undated) DEVICE — GOWN XLG DISP 9545

## (undated) DEVICE — PILLOW ABDUCT HIP LG

## (undated) DEVICE — SU MONOCRYL 2-0 CT-1 36" UND Y945H

## (undated) DEVICE — SOL NACL 0.9% IRRIG 3000ML BAG 07972-08

## (undated) RX ORDER — FENTANYL CITRATE 50 UG/ML
INJECTION, SOLUTION INTRAMUSCULAR; INTRAVENOUS
Status: DISPENSED
Start: 2018-06-21

## (undated) RX ORDER — BUPIVACAINE HYDROCHLORIDE 5 MG/ML
INJECTION, SOLUTION EPIDURAL; INTRACAUDAL
Status: DISPENSED
Start: 2018-06-21

## (undated) RX ORDER — GLYCOPYRROLATE 0.2 MG/ML
INJECTION, SOLUTION INTRAMUSCULAR; INTRAVENOUS
Status: DISPENSED
Start: 2018-06-21

## (undated) RX ORDER — PROPOFOL 10 MG/ML
INJECTION, EMULSION INTRAVENOUS
Status: DISPENSED
Start: 2018-06-21

## (undated) RX ORDER — PHENYLEPHRINE HCL IN 0.9% NACL 1 MG/10 ML
SYRINGE (ML) INTRAVENOUS
Status: DISPENSED
Start: 2018-06-21

## (undated) RX ORDER — DEXAMETHASONE SODIUM PHOSPHATE 4 MG/ML
INJECTION, SOLUTION INTRA-ARTICULAR; INTRALESIONAL; INTRAMUSCULAR; INTRAVENOUS; SOFT TISSUE
Status: DISPENSED
Start: 2018-06-21

## (undated) RX ORDER — ONDANSETRON 2 MG/ML
INJECTION INTRAMUSCULAR; INTRAVENOUS
Status: DISPENSED
Start: 2018-06-21